# Patient Record
Sex: MALE | Race: BLACK OR AFRICAN AMERICAN | NOT HISPANIC OR LATINO | Employment: FULL TIME | URBAN - METROPOLITAN AREA
[De-identification: names, ages, dates, MRNs, and addresses within clinical notes are randomized per-mention and may not be internally consistent; named-entity substitution may affect disease eponyms.]

---

## 2018-12-12 ENCOUNTER — HOSPITAL ENCOUNTER (EMERGENCY)
Facility: HOSPITAL | Age: 48
Discharge: HOME | End: 2018-12-12
Attending: EMERGENCY MEDICINE
Payer: MEDICAID

## 2018-12-12 VITALS
OXYGEN SATURATION: 96 % | HEART RATE: 100 BPM | DIASTOLIC BLOOD PRESSURE: 105 MMHG | TEMPERATURE: 98 F | WEIGHT: 280 LBS | SYSTOLIC BLOOD PRESSURE: 168 MMHG | BODY MASS INDEX: 43.95 KG/M2 | HEIGHT: 67 IN | RESPIRATION RATE: 18 BRPM

## 2018-12-12 DIAGNOSIS — M10.9 ACUTE GOUT INVOLVING TOE OF LEFT FOOT, UNSPECIFIED CAUSE: Primary | ICD-10-CM

## 2018-12-12 LAB
BUN SERPL-MCNC: 9 MG/DL (ref 6–30)
CHLORIDE SERPL-SCNC: 103 MMOL/L (ref 95–110)
CREAT SERPL-MCNC: 0.8 MG/DL (ref 0.5–1.4)
GLUCOSE SERPL-MCNC: 98 MG/DL (ref 70–110)
HCT VFR BLD CALC: 40 %PCV (ref 36–54)
POC IONIZED CALCIUM: 1.2 MMOL/L (ref 1.06–1.42)
POC TCO2 (MEASURED): 25 MMOL/L (ref 23–29)
POTASSIUM BLD-SCNC: 4 MMOL/L (ref 3.5–5.1)
SAMPLE: NORMAL
SODIUM BLD-SCNC: 142 MMOL/L (ref 136–145)

## 2018-12-12 PROCEDURE — 96372 THER/PROPH/DIAG INJ SC/IM: CPT

## 2018-12-12 PROCEDURE — 99284 EMERGENCY DEPT VISIT MOD MDM: CPT | Mod: ,,,

## 2018-12-12 PROCEDURE — 63600175 PHARM REV CODE 636 W HCPCS

## 2018-12-12 PROCEDURE — 25000003 PHARM REV CODE 250

## 2018-12-12 PROCEDURE — 99284 EMERGENCY DEPT VISIT MOD MDM: CPT | Mod: 25

## 2018-12-12 RX ORDER — ATORVASTATIN CALCIUM 10 MG/1
10 TABLET, FILM COATED ORAL DAILY
COMMUNITY
End: 2019-11-25 | Stop reason: CLARIF

## 2018-12-12 RX ORDER — ALLOPURINOL 100 MG/1
100 TABLET ORAL DAILY
COMMUNITY
End: 2021-04-05 | Stop reason: SDUPTHER

## 2018-12-12 RX ORDER — METFORMIN HYDROCHLORIDE 1000 MG/1
1000 TABLET ORAL 2 TIMES DAILY WITH MEALS
COMMUNITY
End: 2021-07-06 | Stop reason: SDUPTHER

## 2018-12-12 RX ORDER — INDOMETHACIN 50 MG/1
50 CAPSULE ORAL 2 TIMES DAILY WITH MEALS
Qty: 20 CAPSULE | Refills: 0 | Status: SHIPPED | OUTPATIENT
Start: 2018-12-12 | End: 2019-11-25 | Stop reason: ALTCHOICE

## 2018-12-12 RX ORDER — DEXAMETHASONE SODIUM PHOSPHATE 4 MG/ML
8 INJECTION, SOLUTION INTRA-ARTICULAR; INTRALESIONAL; INTRAMUSCULAR; INTRAVENOUS; SOFT TISSUE
Status: COMPLETED | OUTPATIENT
Start: 2018-12-12 | End: 2018-12-12

## 2018-12-12 RX ORDER — INDOMETHACIN 25 MG/1
50 CAPSULE ORAL
Status: COMPLETED | OUTPATIENT
Start: 2018-12-12 | End: 2018-12-12

## 2018-12-12 RX ORDER — HYDROCHLOROTHIAZIDE 12.5 MG/1
12.5 TABLET ORAL DAILY
COMMUNITY
End: 2019-11-25 | Stop reason: CLARIF

## 2018-12-12 RX ORDER — LISINOPRIL 10 MG/1
10 TABLET ORAL DAILY
COMMUNITY
End: 2019-11-25 | Stop reason: CLARIF

## 2018-12-12 RX ADMIN — DEXAMETHASONE SODIUM PHOSPHATE 8 MG: 4 INJECTION, SOLUTION INTRAMUSCULAR; INTRAVENOUS at 04:12

## 2018-12-12 RX ADMIN — INDOMETHACIN 50 MG: 25 CAPSULE ORAL at 04:12

## 2018-12-12 NOTE — ED NOTES
LOC: The patient is awake, alert, and oriented to place, time, situation. Affect is appropriate.  Speech is appropriate and clear.     APPEARANCE: Patient resting uncomfortably, reporting left foot pain,  in no acute distress.  Patient is clean and well groomed.    SKIN: The skin is warm and dry; color consistent with ethnicity.  Patient has normal skin turgor and moist mucus membranes.  Skin intact; no breakdown or bruising noted.     MUSCULOSKELETAL: Pain and swelling left foot. Patient moving upper  extremities without difficulty.  Denies weakness.     RESPIRATORY: Airway is open and patent. Respirations spontaneous, even, easy, and non-labored.  Patient has a normal effort and rate.  No accessory muscle use noted. Denies cough.     CARDIAC:    No peripheral edema noted. No complaints of chest pain.      ABDOMEN: Soft and non tender to palpation.  No distention noted.     NEUROLOGIC: Eyes open spontaneously.  Behavior appropriate to situation.  Follows commands; facial expression symmetrical.  Purposeful motor response noted; normal sensation in all extremities.

## 2018-12-12 NOTE — ED PROVIDER NOTES
Encounter Date: 12/12/2018    SCRIBE #1 NOTE: I, Pearl Pozo, am scribing for, and in the presence of,  Shirin Hilario. I have scribed the following portions of the note - the APC attestation.       History     Chief Complaint   Patient presents with    Gout     left foot     47-year-old male with medical history of hypertension, diabetes mellitus type 2 and gout presents the ED with left toe pain and swelling.  He reports symptoms began this morning. Describes pain as throbbing and feels exactly like gout flares.  He states that he has not taken his allopurinol in 2 days and ate multiple peppers in a salad last night. . Denies injury, fall, fever, chills, nausea, vomiting, chest pain, shortness of breath, streaking, leg swelling.           Review of patient's allergies indicates:  No Known Allergies  Past Medical History:   Diagnosis Date    Diabetes mellitus     Gout     Hypertension      Past Surgical History:   Procedure Laterality Date    BACK SURGERY       No family history on file.  Social History     Tobacco Use    Smoking status: Heavy Tobacco Smoker    Smokeless tobacco: Never Used   Substance Use Topics    Alcohol use: No     Frequency: Never    Drug use: No     Review of Systems   Constitutional: Negative for chills, diaphoresis, fatigue and fever.   HENT: Negative for congestion.    Eyes: Negative for visual disturbance.   Respiratory: Negative for cough and shortness of breath.    Cardiovascular: Negative for chest pain, palpitations and leg swelling.   Gastrointestinal: Negative for abdominal pain, nausea and vomiting.   Genitourinary: Negative for dysuria and hematuria.   Musculoskeletal: Positive for arthralgias and joint swelling. Negative for back pain, myalgias and neck pain.   Skin: Negative for rash and wound.   Neurological: Negative for syncope, weakness, light-headedness, numbness and headaches.   Psychiatric/Behavioral: The patient is not nervous/anxious.        Physical  Exam     Initial Vitals [12/12/18 1541]   BP Pulse Resp Temp SpO2   (!) 168/105 100 18 98.2 °F (36.8 °C) 96 %      MAP       --         Physical Exam    Vitals reviewed.  Constitutional: He appears well-developed and well-nourished. He is not diaphoretic. No distress.   HENT:   Head: Normocephalic and atraumatic.   Nose: Nose normal.   Mouth/Throat: Oropharynx is clear and moist. No oropharyngeal exudate.   Eyes: Conjunctivae and EOM are normal. Pupils are equal, round, and reactive to light.   Neck: Normal range of motion. Neck supple. No thyromegaly present.   Cardiovascular: Normal rate, regular rhythm and intact distal pulses.   Pulmonary/Chest: Breath sounds normal. No respiratory distress. He has no wheezes. He exhibits no tenderness.   Abdominal: Soft. Bowel sounds are normal. He exhibits no distension. There is no tenderness. There is no rebound and no guarding.   Musculoskeletal: Normal range of motion.        Feet:    Lymphadenopathy:     He has no cervical adenopathy.   Neurological: He is alert and oriented to person, place, and time. He has normal strength. No sensory deficit.   Skin: Skin is warm and dry. Capillary refill takes less than 2 seconds. No rash and no abscess noted. There is erythema.   Psychiatric: He has a normal mood and affect.         ED Course   Procedures  Labs Reviewed   ISTAT PROCEDURE          Imaging Results    None          Medical Decision Making:   Initial Assessment:   47-year-old male with medical history of hypertension, diabetes mellitus type 2 and gout presents the ED with left toe pain and swelling that began this am. Has not taken allopurinol in 2 days and at peppers last night. Has not tried any OTC meds to alleviate symptoms. +erythema and swelling to left toe. No signs of fluctuance, induration, drainage, rash, wound or streaking.   Differential Diagnosis:   DDX includes but is not limited to gout, cellulitis, dermatitis. Considered but do not suspect fracture or  osteomyelitis.   Clinical Tests:   Lab Tests: Ordered and Reviewed  ED Management:  Will get Istat to evaluate creatinine. Cr 0.8. Will give IM decadron 8mg and indomethacin 50mg. Will discharged with indomethacin 50mg BID x 3 days and PCP follow up in 2 days.     Least likely suspect cellulitis since patient reports that this feels exactly liek gout flare and he has not taken allopurinol in 2 days. Denies fever, chills. Will treat with indomethacin and have patient follow up with PCP at [facility] in 2-3 days. patient is in agreement with plan. Discharged to home in stable condition, return to ED warnings given, follow up and patient care instructions given.      I have discussed the treatment and management of this patient with my supervisory physician, and we agree on the plan of care.               Scribe Attestation:   Scribe #1: I performed the above scribed service and the documentation accurately describes the services I performed. I attest to the accuracy of the note.    Attending Attestation:     Physician Attestation Statement for NP/PA:   I discussed this assessment and plan of this patient with the NP/PA, but I did not personally examine the patient. The face to face encounter was performed by the NP/PA.                     Clinical Impression:   The encounter diagnosis was Acute gout involving toe of left foot, unspecified cause.      Disposition:   Disposition: Discharged  Condition: Stable                        Kandis Begum PA-C  12/12/18 2036       Kendall Carpio MD  12/12/18 2230

## 2018-12-12 NOTE — DISCHARGE INSTRUCTIONS
Take indomethacin twice a day for 3 days and follow up with primary care doctor in 2 days.     Our goal in the emergency department is to always give you outstanding care and exceptional service. You may receive a survey by mail or e-mail in the next week regarding your experience in our ED. We would greatly appreciate your completing and returning the survey. Your feedback provides us with a way to recognize our staff who give very good care and it helps us learn how to improve when your experience was below our aspiration of excellence.

## 2019-11-25 ENCOUNTER — HOSPITAL ENCOUNTER (EMERGENCY)
Facility: HOSPITAL | Age: 49
Discharge: HOME | End: 2019-11-25
Attending: EMERGENCY MEDICINE
Payer: MEDICAID

## 2019-11-25 VITALS
DIASTOLIC BLOOD PRESSURE: 81 MMHG | WEIGHT: 289 LBS | HEIGHT: 67 IN | HEART RATE: 93 BPM | OXYGEN SATURATION: 92 % | TEMPERATURE: 100 F | RESPIRATION RATE: 18 BRPM | SYSTOLIC BLOOD PRESSURE: 132 MMHG | BODY MASS INDEX: 45.36 KG/M2

## 2019-11-25 DIAGNOSIS — R05.9 COUGH: ICD-10-CM

## 2019-11-25 DIAGNOSIS — J40 BRONCHITIS: Primary | ICD-10-CM

## 2019-11-25 LAB
ALBUMIN SERPL BCP-MCNC: 3.8 G/DL (ref 3.5–5.2)
ALP SERPL-CCNC: 111 U/L (ref 55–135)
ALT SERPL W/O P-5'-P-CCNC: 27 U/L (ref 10–44)
ANION GAP SERPL CALC-SCNC: 11 MMOL/L (ref 8–16)
AST SERPL-CCNC: 24 U/L (ref 10–40)
BASOPHILS # BLD AUTO: 0.06 K/UL (ref 0–0.2)
BASOPHILS NFR BLD: 0.7 % (ref 0–1.9)
BILIRUB SERPL-MCNC: 1 MG/DL (ref 0.1–1)
BNP SERPL-MCNC: <10 PG/ML (ref 0–99)
BUN SERPL-MCNC: 14 MG/DL (ref 6–20)
CALCIUM SERPL-MCNC: 9 MG/DL (ref 8.7–10.5)
CHLORIDE SERPL-SCNC: 104 MMOL/L (ref 95–110)
CO2 SERPL-SCNC: 22 MMOL/L (ref 23–29)
CREAT SERPL-MCNC: 1.4 MG/DL (ref 0.5–1.4)
DIFFERENTIAL METHOD: ABNORMAL
EOSINOPHIL # BLD AUTO: 0.1 K/UL (ref 0–0.5)
EOSINOPHIL NFR BLD: 0.8 % (ref 0–8)
ERYTHROCYTE [DISTWIDTH] IN BLOOD BY AUTOMATED COUNT: 12.6 % (ref 11.5–14.5)
EST. GFR  (AFRICAN AMERICAN): >60 ML/MIN/1.73 M^2
EST. GFR  (NON AFRICAN AMERICAN): 59 ML/MIN/1.73 M^2
GLUCOSE SERPL-MCNC: 124 MG/DL (ref 70–110)
HCT VFR BLD AUTO: 39.8 % (ref 40–54)
HGB BLD-MCNC: 13.2 G/DL (ref 14–18)
IMM GRANULOCYTES # BLD AUTO: 0.05 K/UL (ref 0–0.04)
INFLUENZA A, MOLECULAR: NEGATIVE
INFLUENZA B, MOLECULAR: NEGATIVE
LYMPHOCYTES # BLD AUTO: 1.9 K/UL (ref 1–4.8)
LYMPHOCYTES NFR BLD: 21.8 % (ref 18–48)
MCH RBC QN AUTO: 30 PG (ref 27–31)
MCHC RBC AUTO-ENTMCNC: 33.2 G/DL (ref 32–36)
MCV RBC AUTO: 91 FL (ref 82–98)
MONOCYTES # BLD AUTO: 0.7 K/UL (ref 0.3–1)
MONOCYTES NFR BLD: 7.6 % (ref 4–15)
NEUTROPHILS # BLD AUTO: 5.9 K/UL (ref 1.8–7.7)
NEUTROPHILS NFR BLD: 68.5 % (ref 38–73)
NRBC BLD-RTO: 0 /100 WBC
PLATELET # BLD AUTO: 252 K/UL (ref 150–350)
PMV BLD AUTO: 10.7 FL (ref 9.2–12.9)
POTASSIUM SERPL-SCNC: 3.8 MMOL/L (ref 3.5–5.1)
PROT SERPL-MCNC: 7 G/DL (ref 6–8.4)
RBC # BLD AUTO: 4.4 M/UL (ref 4.6–6.2)
SODIUM SERPL-SCNC: 137 MMOL/L (ref 136–145)
SPECIMEN SOURCE: NORMAL
TROPONIN I SERPL DL<=0.01 NG/ML-MCNC: 0.01 NG/ML (ref 0–0.03)
WBC # BLD AUTO: 8.56 K/UL (ref 3.9–12.7)

## 2019-11-25 PROCEDURE — 36415 COLL VENOUS BLD VENIPUNCTURE: CPT

## 2019-11-25 PROCEDURE — 99284 EMERGENCY DEPT VISIT MOD MDM: CPT | Mod: 25

## 2019-11-25 PROCEDURE — 80053 COMPREHEN METABOLIC PANEL: CPT

## 2019-11-25 PROCEDURE — 84484 ASSAY OF TROPONIN QUANT: CPT

## 2019-11-25 PROCEDURE — 85025 COMPLETE CBC W/AUTO DIFF WBC: CPT

## 2019-11-25 PROCEDURE — 87502 INFLUENZA DNA AMP PROBE: CPT

## 2019-11-25 PROCEDURE — 25000003 PHARM REV CODE 250: Performed by: EMERGENCY MEDICINE

## 2019-11-25 PROCEDURE — 83880 ASSAY OF NATRIURETIC PEPTIDE: CPT

## 2019-11-25 RX ORDER — SIMVASTATIN 20 MG/1
20 TABLET, FILM COATED ORAL NIGHTLY
COMMUNITY
End: 2021-04-05

## 2019-11-25 RX ORDER — AMLODIPINE BESYLATE 10 MG/1
10 TABLET ORAL DAILY
COMMUNITY
End: 2021-07-06 | Stop reason: SDUPTHER

## 2019-11-25 RX ORDER — CYCLOBENZAPRINE HCL 5 MG
5 TABLET ORAL 3 TIMES DAILY PRN
COMMUNITY
End: 2021-04-05

## 2019-11-25 RX ORDER — FLUTICASONE PROPIONATE 50 UG/1
POWDER, METERED RESPIRATORY (INHALATION)
COMMUNITY
End: 2021-04-05

## 2019-11-25 RX ORDER — ERGOCALCIFEROL 1.25 MG/1
50000 CAPSULE ORAL
COMMUNITY
End: 2021-04-05

## 2019-11-25 RX ORDER — AZITHROMYCIN 250 MG/1
250 TABLET, FILM COATED ORAL DAILY
Qty: 6 TABLET | Refills: 0 | Status: SHIPPED | OUTPATIENT
Start: 2019-11-25 | End: 2021-04-05

## 2019-11-25 RX ORDER — ACETAMINOPHEN 325 MG/1
650 TABLET ORAL
Status: COMPLETED | OUTPATIENT
Start: 2019-11-25 | End: 2019-11-25

## 2019-11-25 RX ORDER — ALBUTEROL SULFATE 5 MG/ML
2.5 SOLUTION RESPIRATORY (INHALATION) EVERY 6 HOURS PRN
COMMUNITY

## 2019-11-25 RX ORDER — CETIRIZINE HYDROCHLORIDE 10 MG/1
10 TABLET ORAL DAILY
COMMUNITY
End: 2022-05-11 | Stop reason: SDUPTHER

## 2019-11-25 RX ORDER — OMEPRAZOLE 40 MG/1
40 CAPSULE, DELAYED RELEASE ORAL DAILY
COMMUNITY
End: 2021-04-05 | Stop reason: SDUPTHER

## 2019-11-25 RX ORDER — BENZONATATE 100 MG/1
100 CAPSULE ORAL 3 TIMES DAILY PRN
Qty: 20 CAPSULE | Refills: 0 | Status: SHIPPED | OUTPATIENT
Start: 2019-11-25 | End: 2019-12-05

## 2019-11-25 RX ORDER — LOSARTAN POTASSIUM 25 MG/1
25 TABLET ORAL DAILY
COMMUNITY
End: 2021-07-06 | Stop reason: SDUPTHER

## 2019-11-25 RX ADMIN — ACETAMINOPHEN 650 MG: 325 TABLET ORAL at 09:11

## 2019-11-26 NOTE — ED PROVIDER NOTES
Encounter Date: 11/25/2019    SCRIBE #1 NOTE: I, Kandis Mayo, am scribing for, and in the presence of, Dr. Broussard.       History     Chief Complaint   Patient presents with    Fever    Chills    Abdominal Pain    Diarrhea       Time seen by provider: 9:33 PM on 11/25/2019    Pierre Masters Jr. is a 48 y.o. male with DM, HTN and asthma who presents to the ED with c/o fever and abdominal pain with associated cough, chills and, body aches that started one week ago. He was seen by his PCP and tested negative for the flu.  Positive sick contact with a family member who has the flu. He has not taken anything today for his fever. PSHx of back surgery. NKDA.     The history is provided by the patient and the spouse.     Review of patient's allergies indicates:  No Known Allergies  Past Medical History:   Diagnosis Date    Asthma     Diabetes mellitus     Gout     Hypertension      Past Surgical History:   Procedure Laterality Date    BACK SURGERY       No family history on file.  Social History     Tobacco Use    Smoking status: Heavy Tobacco Smoker    Smokeless tobacco: Never Used   Substance Use Topics    Alcohol use: No     Frequency: Never    Drug use: No     Review of Systems   Constitutional: Positive for chills and fever.   Respiratory: Positive for cough. Negative for shortness of breath.    Gastrointestinal: Positive for abdominal pain.   Genitourinary: Negative for flank pain.   Musculoskeletal: Positive for myalgias (generalized). Negative for gait problem.   Neurological: Negative for weakness.   Psychiatric/Behavioral: Negative for confusion.       Physical Exam     Initial Vitals [11/25/19 2111]   BP Pulse Resp Temp SpO2   132/81 (!) 116 18 100.3 °F (37.9 °C) 95 %      MAP       --         Physical Exam    Nursing note and vitals reviewed.  Constitutional: He appears well-developed and well-nourished. He is not diaphoretic. No distress.   HENT:   Head: Normocephalic and atraumatic.   Eyes:  EOM are normal. Pupils are equal, round, and reactive to light.   Neck: Normal range of motion. Neck supple.   Cardiovascular: Regular rhythm, normal heart sounds and intact distal pulses. Tachycardia present.  Exam reveals no gallop and no friction rub.    No murmur heard.  Pulmonary/Chest: Breath sounds normal. No respiratory distress. He has no wheezes. He has no rhonchi. He has no rales.   Abdominal: Soft. Bowel sounds are normal. There is no tenderness. There is no rebound and no guarding.   Musculoskeletal: Normal range of motion.   Neurological: He is alert and oriented to person, place, and time.   Skin: Skin is warm.   Psychiatric: He has a normal mood and affect. His behavior is normal. Judgment and thought content normal.         ED Course   Procedures  Labs Reviewed   COMPREHENSIVE METABOLIC PANEL - Abnormal; Notable for the following components:       Result Value    CO2 22 (*)     Glucose 124 (*)     eGFR if non  59 (*)     All other components within normal limits   CBC W/ AUTO DIFFERENTIAL - Abnormal; Notable for the following components:    RBC 4.40 (*)     Hemoglobin 13.2 (*)     Hematocrit 39.8 (*)     Immature Grans (Abs) 0.05 (*)     All other components within normal limits   INFLUENZA A & B BY MOLECULAR   B-TYPE NATRIURETIC PEPTIDE   TROPONIN I          Imaging Results          X-Ray Chest PA And Lateral (In process)                  Medical Decision Making:   History:   Old Medical Records: I decided to obtain old medical records.  Initial Assessment:   48-year-old male presented with multiple complaints.  Differential Diagnosis:   My differential diagnosis includes influenza, pneumonia, and bronchitis.     Clinical Tests:   Lab Tests: Ordered and Reviewed  Radiological Study: Ordered and Reviewed  ED Management:  The patient was emergently evaluated in the emergency department, his evaluation was significant for a middle-age male with a benign abdominal exam.  The patient's  x-ray shows a bronchitis per Radiology.  The patient's labs showed no acute abnormalities.  The patient was treated with p.o. Tylenol and IV fluids.  He is stable for discharge to home.  He will be discharged home with p.o. Zithromax and p.o. Tessalon Perles.  He is to otherwise follow up with his PCP for further care.            Scribe Attestation:   Scribe #1: I performed the above scribed service and the documentation accurately describes the services I performed. I attest to the accuracy of the note.        I, Dr. Justino Broussard, personally performed the services described in this documentation. All medical record entries made by the scribe were at my direction and in my presence.  I have reviewed the chart and agree that the record reflects my personal performance and is accurate and complete. Justino Broussard MD.  2:05 AM 11/26/2019                     Clinical Impression:       ICD-10-CM ICD-9-CM   1. Bronchitis J40 490   2. Cough R05 786.2         Disposition:   Disposition: Discharged  Condition: Stable                     Justino Broussard MD  11/26/19 0206

## 2020-01-20 ENCOUNTER — OCCUPATIONAL HEALTH (OUTPATIENT)
Dept: URGENT CARE | Facility: CLINIC | Age: 50
End: 2020-01-20
Payer: MEDICAID

## 2020-01-20 DIAGNOSIS — Z02.83 ENCOUNTER FOR DRUG SCREENING: Primary | ICD-10-CM

## 2020-01-20 PROCEDURE — 80305 OOH NON-DOT DRUG SCREEN: ICD-10-PCS | Mod: S$GLB,,, | Performed by: PREVENTIVE MEDICINE

## 2020-01-20 PROCEDURE — 80305 DRUG TEST PRSMV DIR OPT OBS: CPT | Mod: S$GLB,,, | Performed by: PREVENTIVE MEDICINE

## 2020-01-20 RX ORDER — LOSARTAN POTASSIUM 25 MG/1
TABLET ORAL
COMMUNITY
Start: 2019-07-08 | End: 2021-04-05 | Stop reason: SDUPTHER

## 2020-01-20 RX ORDER — MOXIFLOXACIN 5 MG/ML
1 SOLUTION/ DROPS OPHTHALMIC
COMMUNITY
Start: 2020-01-06 | End: 2021-04-05

## 2020-01-20 RX ORDER — CARBOXYMETHYLCELLULOSE SODIUM 5 MG/ML
1 SOLUTION/ DROPS OPHTHALMIC
COMMUNITY
Start: 2020-01-13 | End: 2021-01-12

## 2020-01-20 RX ORDER — HYDROCHLOROTHIAZIDE 12.5 MG/1
CAPSULE ORAL
COMMUNITY
Start: 2019-01-31 | End: 2021-04-05

## 2020-01-20 RX ORDER — DICLOFENAC SODIUM 10 MG/G
GEL TOPICAL
COMMUNITY
Start: 2019-04-03 | End: 2020-10-07 | Stop reason: SDUPTHER

## 2020-01-20 RX ORDER — NAPROXEN SODIUM 220 MG/1
81 TABLET, FILM COATED ORAL
COMMUNITY
Start: 2019-01-31 | End: 2022-08-16

## 2020-01-20 RX ORDER — ALLOPURINOL 100 MG/1
TABLET ORAL
COMMUNITY
Start: 2019-05-02 | End: 2021-04-05 | Stop reason: SDUPTHER

## 2020-01-20 RX ORDER — AZELASTINE 1 MG/ML
SPRAY, METERED NASAL
COMMUNITY
Start: 2019-10-17 | End: 2022-05-11 | Stop reason: SDUPTHER

## 2020-01-20 RX ORDER — LISINOPRIL 10 MG/1
10 TABLET ORAL
COMMUNITY
End: 2021-04-05

## 2020-01-20 RX ORDER — IBUPROFEN 600 MG/1
TABLET ORAL
Refills: 0 | COMMUNITY
Start: 2019-11-18 | End: 2020-10-07 | Stop reason: SDUPTHER

## 2020-01-20 RX ORDER — ATORVASTATIN CALCIUM 10 MG/1
10 TABLET, FILM COATED ORAL
COMMUNITY
End: 2021-04-05

## 2020-01-20 RX ORDER — FLUTICASONE PROPIONATE 50 MCG
SPRAY, SUSPENSION (ML) NASAL
Refills: 0 | COMMUNITY
Start: 2019-11-18 | End: 2021-08-13 | Stop reason: SDUPTHER

## 2020-01-20 RX ORDER — HYDROCHLOROTHIAZIDE 12.5 MG/1
12.5 TABLET ORAL
COMMUNITY
End: 2021-04-05

## 2020-01-20 RX ORDER — AMLODIPINE BESYLATE 10 MG/1
10 TABLET ORAL
COMMUNITY
Start: 2019-05-02 | End: 2020-05-01

## 2020-01-20 RX ORDER — TRIAMCINOLONE ACETONIDE 1 MG/G
OINTMENT TOPICAL
COMMUNITY
Start: 2019-07-18 | End: 2021-08-13 | Stop reason: SDUPTHER

## 2020-01-20 RX ORDER — OMEPRAZOLE 40 MG/1
CAPSULE, DELAYED RELEASE ORAL
COMMUNITY
Start: 2019-12-30 | End: 2021-07-28 | Stop reason: SDUPTHER

## 2020-01-20 RX ORDER — SIMVASTATIN 20 MG/1
20 TABLET, FILM COATED ORAL
COMMUNITY
Start: 2019-05-20 | End: 2021-04-05

## 2020-01-20 RX ORDER — PROMETHAZINE HYDROCHLORIDE AND CODEINE PHOSPHATE 6.25; 1 MG/5ML; MG/5ML
SOLUTION ORAL
Refills: 0 | COMMUNITY
Start: 2019-11-18 | End: 2021-04-05

## 2020-01-20 RX ORDER — ALBUTEROL SULFATE 90 UG/1
AEROSOL, METERED RESPIRATORY (INHALATION)
COMMUNITY
Start: 2018-10-25 | End: 2021-08-13 | Stop reason: SDUPTHER

## 2020-01-20 RX ORDER — METFORMIN HYDROCHLORIDE 1000 MG/1
TABLET ORAL
COMMUNITY
Start: 2019-05-02 | End: 2021-04-05 | Stop reason: SDUPTHER

## 2020-01-20 RX ORDER — ACYCLOVIR 200 MG/1
400 CAPSULE ORAL
COMMUNITY
Start: 2019-12-19 | End: 2021-04-05

## 2020-01-20 RX ORDER — MELOXICAM 7.5 MG/1
7.5 TABLET ORAL
COMMUNITY
Start: 2019-04-03 | End: 2020-10-07 | Stop reason: SDUPTHER

## 2020-01-20 RX ORDER — INDOMETHACIN 50 MG/1
CAPSULE ORAL
COMMUNITY
Start: 2018-12-12 | End: 2020-10-07 | Stop reason: SDUPTHER

## 2020-01-20 RX ORDER — LIDOCAINE HYDROCHLORIDE 20 MG/ML
SOLUTION ORAL; TOPICAL
Refills: 0 | COMMUNITY
Start: 2019-11-18 | End: 2021-04-05

## 2020-01-20 RX ORDER — METHOCARBAMOL 500 MG/1
TABLET, FILM COATED ORAL
COMMUNITY
Start: 2017-09-19 | End: 2021-04-05

## 2020-02-11 ENCOUNTER — HOSPITAL ENCOUNTER (EMERGENCY)
Facility: HOSPITAL | Age: 50
Discharge: HOME | End: 2020-02-11
Attending: EMERGENCY MEDICINE
Payer: MEDICAID

## 2020-02-11 VITALS
HEART RATE: 94 BPM | BODY MASS INDEX: 45.2 KG/M2 | HEIGHT: 67 IN | WEIGHT: 288 LBS | OXYGEN SATURATION: 98 % | TEMPERATURE: 98 F | DIASTOLIC BLOOD PRESSURE: 84 MMHG | RESPIRATION RATE: 18 BRPM | SYSTOLIC BLOOD PRESSURE: 165 MMHG

## 2020-02-11 DIAGNOSIS — M10.9 ACUTE GOUT INVOLVING TOE OF LEFT FOOT, UNSPECIFIED CAUSE: Primary | ICD-10-CM

## 2020-02-11 PROCEDURE — 63600175 PHARM REV CODE 636 W HCPCS: Performed by: PHYSICIAN ASSISTANT

## 2020-02-11 PROCEDURE — 99284 EMERGENCY DEPT VISIT MOD MDM: CPT | Mod: 25

## 2020-02-11 PROCEDURE — 25000003 PHARM REV CODE 250: Performed by: EMERGENCY MEDICINE

## 2020-02-11 PROCEDURE — 96372 THER/PROPH/DIAG INJ SC/IM: CPT | Mod: 59

## 2020-02-11 RX ORDER — PREDNISONE 20 MG/1
40 TABLET ORAL DAILY
Qty: 10 TABLET | Refills: 0 | Status: SHIPPED | OUTPATIENT
Start: 2020-02-11 | End: 2020-02-16

## 2020-02-11 RX ORDER — HYDROCODONE BITARTRATE AND ACETAMINOPHEN 5; 325 MG/1; MG/1
1 TABLET ORAL
Status: COMPLETED | OUTPATIENT
Start: 2020-02-11 | End: 2020-02-11

## 2020-02-11 RX ORDER — HYDROCODONE BITARTRATE AND ACETAMINOPHEN 5; 325 MG/1; MG/1
1 TABLET ORAL EVERY 4 HOURS PRN
Qty: 10 TABLET | Refills: 0 | Status: SHIPPED | OUTPATIENT
Start: 2020-02-11 | End: 2021-04-05

## 2020-02-11 RX ORDER — METHYLPREDNISOLONE SOD SUCC 125 MG
125 VIAL (EA) INJECTION
Status: COMPLETED | OUTPATIENT
Start: 2020-02-11 | End: 2020-02-11

## 2020-02-11 RX ADMIN — METHYLPREDNISOLONE SODIUM SUCCINATE 125 MG: 125 INJECTION, POWDER, FOR SOLUTION INTRAMUSCULAR; INTRAVENOUS at 08:02

## 2020-02-11 RX ADMIN — HYDROCODONE BITARTRATE AND ACETAMINOPHEN 1 TABLET: 5; 325 TABLET ORAL at 09:02

## 2020-02-12 NOTE — ED PROVIDER NOTES
Encounter Date: 2/11/2020       History     Chief Complaint   Patient presents with    Gout     Left toe pain since yesterday. States has been eating what he's not supposed to be eating.      Patient here with reported flare-up of his gout after eating crawfish reports pain in his left great toe he states he typically takes allopurinol his Indocin does not help his pain he denies any other symptoms no other joint pain reported no trauma noted no fever chills        Review of patient's allergies indicates:  No Known Allergies  Past Medical History:   Diagnosis Date    Asthma     Diabetes mellitus     Gout     Hypertension      Past Surgical History:   Procedure Laterality Date    BACK SURGERY       No family history on file.  Social History     Tobacco Use    Smoking status: Heavy Tobacco Smoker    Smokeless tobacco: Never Used   Substance Use Topics    Alcohol use: No     Frequency: Never    Drug use: No     Review of Systems   Constitutional: Negative for appetite change, chills and fever.   HENT: Negative.    Eyes: Negative.    Respiratory: Negative.    Endocrine: Negative.    Genitourinary: Negative.    Musculoskeletal: Positive for arthralgias.   Skin: Negative for color change and rash.   Allergic/Immunologic: Negative.    Neurological: Negative.    Hematological: Negative.    Psychiatric/Behavioral: Negative.        Physical Exam     Initial Vitals [02/11/20 1919]   BP Pulse Resp Temp SpO2   (!) 177/86 91 18 98.1 °F (36.7 °C) 98 %      MAP       --         Physical Exam    Constitutional: He appears well-developed and well-nourished. No distress.   HENT:   Head: Normocephalic and atraumatic.   Eyes: Conjunctivae and EOM are normal. Pupils are equal, round, and reactive to light.   Cardiovascular: Normal rate, regular rhythm, normal heart sounds and intact distal pulses.   Pulmonary/Chest: Breath sounds normal. No respiratory distress.   Musculoskeletal: Normal range of motion. He exhibits tenderness.    Left great toe with swelling and tenderness pain with range of motion no significant erythema noted 2+ dorsalis pedis and posterior tibial pulses bilaterally   Neurological: He is alert and oriented to person, place, and time. GCS score is 15. GCS eye subscore is 4. GCS verbal subscore is 5. GCS motor subscore is 6.   Skin: Skin is warm and dry. Capillary refill takes less than 2 seconds. No erythema.   Psychiatric: He has a normal mood and affect. His behavior is normal.         ED Course   Procedures  Labs Reviewed - No data to display       Imaging Results    None                            ED Course as of Feb 11 2132   Tue Feb 11, 2020 2010 Right foot gout taking allopurinol with no relief and states indocin does not help  Bp elevated    [ES]   2032 Solumedrol ordered for left toe pain.      [ES]      ED Course User Index  [ES] Brigette Davalos PA-C                Clinical Impression:       ICD-10-CM ICD-9-CM   1. Acute gout involving toe of left foot, unspecified cause M10.9 274.01                             Maximo Hernandez MD  02/11/20 3736

## 2020-04-21 ENCOUNTER — HOSPITAL ENCOUNTER (EMERGENCY)
Facility: HOSPITAL | Age: 50
Discharge: HOME | End: 2020-04-21
Attending: EMERGENCY MEDICINE
Payer: MEDICAID

## 2020-04-21 VITALS
DIASTOLIC BLOOD PRESSURE: 77 MMHG | BODY MASS INDEX: 44.42 KG/M2 | TEMPERATURE: 98 F | RESPIRATION RATE: 19 BRPM | WEIGHT: 283 LBS | SYSTOLIC BLOOD PRESSURE: 165 MMHG | OXYGEN SATURATION: 99 % | HEART RATE: 78 BPM | HEIGHT: 67 IN

## 2020-04-21 DIAGNOSIS — M10.9 EXACERBATION OF GOUT: Primary | ICD-10-CM

## 2020-04-21 PROCEDURE — 99284 EMERGENCY DEPT VISIT MOD MDM: CPT | Mod: 25

## 2020-04-21 PROCEDURE — 63600175 PHARM REV CODE 636 W HCPCS: Performed by: EMERGENCY MEDICINE

## 2020-04-21 PROCEDURE — 96372 THER/PROPH/DIAG INJ SC/IM: CPT | Mod: 59

## 2020-04-21 RX ORDER — KETOROLAC TROMETHAMINE 30 MG/ML
30 INJECTION, SOLUTION INTRAMUSCULAR; INTRAVENOUS
Status: COMPLETED | OUTPATIENT
Start: 2020-04-21 | End: 2020-04-21

## 2020-04-21 RX ORDER — INDOMETHACIN 50 MG/1
50 CAPSULE ORAL
Qty: 12 CAPSULE | Refills: 0 | Status: SHIPPED | OUTPATIENT
Start: 2020-04-21 | End: 2020-10-07 | Stop reason: SDUPTHER

## 2020-04-21 RX ADMIN — KETOROLAC TROMETHAMINE 30 MG: 30 INJECTION, SOLUTION INTRAMUSCULAR; INTRAVENOUS at 02:04

## 2020-04-21 NOTE — ED PROVIDER NOTES
Encounter Date: 4/21/2020       History     Chief Complaint   Patient presents with    Ankle Pain     Pt states L ankle and foot pain since sat. States hx of gout.     Patient with a history of gout.  Patient had crawfish 3 days ago.  His 1 day history of ankle pain.  He has some swelling.  Increased pain with walking.  No trauma.  Similar to previous gouty exacerbations.        Review of patient's allergies indicates:  No Known Allergies  Past Medical History:   Diagnosis Date    Asthma     Diabetes mellitus     Gout     Hypertension      Past Surgical History:   Procedure Laterality Date    BACK SURGERY       No family history on file.  Social History     Tobacco Use    Smoking status: Heavy Tobacco Smoker    Smokeless tobacco: Never Used   Substance Use Topics    Alcohol use: No     Frequency: Never    Drug use: No     Review of Systems   Constitutional: Negative for chills and fever.   HENT: Negative for sore throat.    Eyes: Negative for photophobia and visual disturbance.   Respiratory: Negative for shortness of breath.    Cardiovascular: Negative for chest pain.   Gastrointestinal: Negative for abdominal pain and vomiting.   Genitourinary: Negative for dysuria.   Musculoskeletal: Negative for back pain.   Skin: Negative for rash.   Neurological: Negative for weakness and headaches.   Psychiatric/Behavioral: Negative for confusion.       Physical Exam     Initial Vitals [04/21/20 1233]   BP Pulse Resp Temp SpO2   (!) 152/95 94 20 99 °F (37.2 °C) 98 %      MAP       --         Physical Exam    Nursing note and vitals reviewed.  Constitutional: He is not diaphoretic. No distress.   HENT:   Head: Normocephalic and atraumatic.   Eyes: Conjunctivae are normal.   Musculoskeletal:   Left ankle with very mild swelling with no erythema or warmth.  Good range of motion.  2+ dorsalis pedis and posterior tibial pulses.  Sensation intact.   Skin: No rash noted.   Psychiatric: He has a normal mood and affect.          ED Course   Procedures  Labs Reviewed - No data to display       Imaging Results    None          Medical Decision Making:   History:   Old Medical Records: I decided to obtain old medical records.  ED Management:  Patient presents with history and physical exam consistent with gouty exacerbation.  Patient reports NSAIDs helped him in the past.  No history of renal sufficiency.  Old record reviewed and patient with normal creatinine.  Will give Toradol and begin Indocin is outpatient.  Patient already on allopurinol                                 Clinical Impression:       ICD-10-CM ICD-9-CM   1. Exacerbation of gout M10.9 274.9                                Roberto Silva MD  04/21/20 1919

## 2020-04-21 NOTE — ED TRIAGE NOTES
Presents to Emergency Room with c/o pain to inner left ankle, states it runs to toes and up to knee on left leg. Patient also states that he believes this is a gout flare up, because he ate crawfish on Sunday. He says that his last flare-up was 2 months ago. A,A,O x3. Respirarions WNL, no c/o CP. V/S WNL. Call light in reach, will continue to monitor.

## 2020-04-30 ENCOUNTER — PATIENT OUTREACH (OUTPATIENT)
Dept: EMERGENCY MEDICINE | Facility: HOSPITAL | Age: 50
End: 2020-04-30

## 2020-10-07 ENCOUNTER — HOSPITAL ENCOUNTER (EMERGENCY)
Facility: HOSPITAL | Age: 50
Discharge: HOME | End: 2020-10-07
Attending: EMERGENCY MEDICINE
Payer: MEDICAID

## 2020-10-07 VITALS
WEIGHT: 284 LBS | HEART RATE: 80 BPM | RESPIRATION RATE: 16 BRPM | DIASTOLIC BLOOD PRESSURE: 94 MMHG | TEMPERATURE: 99 F | HEIGHT: 67 IN | OXYGEN SATURATION: 99 % | SYSTOLIC BLOOD PRESSURE: 144 MMHG | BODY MASS INDEX: 44.57 KG/M2

## 2020-10-07 DIAGNOSIS — M10.9 ACUTE GOUTY ARTHRITIS: Primary | ICD-10-CM

## 2020-10-07 PROCEDURE — 96372 THER/PROPH/DIAG INJ SC/IM: CPT

## 2020-10-07 PROCEDURE — 63600175 PHARM REV CODE 636 W HCPCS: Performed by: EMERGENCY MEDICINE

## 2020-10-07 PROCEDURE — 99284 EMERGENCY DEPT VISIT MOD MDM: CPT | Mod: 25

## 2020-10-07 RX ORDER — INDOMETHACIN 50 MG/1
50 CAPSULE ORAL
Qty: 15 CAPSULE | Refills: 0 | OUTPATIENT
Start: 2020-10-07 | End: 2021-03-17

## 2020-10-07 RX ORDER — KETOROLAC TROMETHAMINE 30 MG/ML
30 INJECTION, SOLUTION INTRAMUSCULAR; INTRAVENOUS
Status: COMPLETED | OUTPATIENT
Start: 2020-10-07 | End: 2020-10-07

## 2020-10-07 RX ADMIN — KETOROLAC TROMETHAMINE 30 MG: 30 INJECTION, SOLUTION INTRAMUSCULAR at 09:10

## 2020-10-07 NOTE — Clinical Note
"Pierre Escobedoe" Guerrero was seen and treated in our emergency department on 10/7/2020.  He may return to work on 10/09/2020.       If you have any questions or concerns, please don't hesitate to call.      Calvin LATIF RN    "

## 2020-10-08 NOTE — ED PROVIDER NOTES
Encounter Date: 10/7/2020    SCRIBE #1 NOTE: I, Vandana Ng, am scribing for, and in the presence of, Dr. Handy.       History     Chief Complaint   Patient presents with    Gout     left foot, started 2 days ago     10/7/2020  9:11 PM     The patient is a 49 y.o. male  who presents with gout. Patient c/o gradual onset of mild swelling and constant achy pain to the left foot which began 2 days ago after eating fish and alcoholic daiquiri. He denies any pain or injury to the foot. Pt reports he has been compliant with his medications, allopurinol and indomethacin. He denies any fevers or chills. PMHx of HTN, gout, DM, asthma. SHx of back surgery.    The history is provided by the patient.     Review of patient's allergies indicates:  No Known Allergies  Past Medical History:   Diagnosis Date    Asthma     Diabetes mellitus     Gout     Hypertension      Past Surgical History:   Procedure Laterality Date    BACK SURGERY       History reviewed. No pertinent family history.  Social History     Tobacco Use    Smoking status: Heavy Tobacco Smoker    Smokeless tobacco: Never Used   Substance Use Topics    Alcohol use: No     Frequency: Never    Drug use: No     Review of Systems   Constitutional: Negative for appetite change, chills and fever.   HENT: Negative for congestion, rhinorrhea and sore throat.    Respiratory: Negative for cough and shortness of breath.    Cardiovascular: Negative for chest pain.   Gastrointestinal: Negative for abdominal pain, diarrhea, nausea and vomiting.   Genitourinary: Negative for dysuria.   Musculoskeletal: Positive for arthralgias and joint swelling. Negative for back pain and myalgias.   Skin: Negative for rash.   Neurological: Negative for weakness and numbness.   Hematological: Does not bruise/bleed easily.       Physical Exam     Initial Vitals [10/07/20 2052]   BP Pulse Resp Temp SpO2   (!) 144/94 95 16 98.6 °F (37 °C) (!) 94 %      MAP       --         Physical  Exam    Nursing note and vitals reviewed.  Constitutional: He appears well-developed and well-nourished. He is not diaphoretic. No distress.   Cardiovascular: Intact distal pulses.   Pulses:       Dorsalis pedis pulses are 2+ on the left side.        Posterior tibial pulses are 2+ on the left side.   Musculoskeletal: Normal range of motion. Tenderness present. No edema.      Comments: +2 pulses. Warm and well perfused left lower extremity. Swelling and TTP to first metatarsal of the left foot.   Neurological: He is alert and oriented to person, place, and time. He has normal strength. No sensory deficit.   Skin: Skin is warm and dry. No rash and no abscess noted. No erythema.   Psychiatric: He has a normal mood and affect.         ED Course   Procedures  Labs Reviewed - No data to display       Imaging Results    None          Medical Decision Making:   History:   Old Medical Records: I decided to obtain old medical records.  ED Management:  Pt presents with pain to left 1st metatarsal x 2 days.   Unlikely fracture, dislocation.  Unlikely septic arthritis.  Unlikely gout flare, reactive arthritis, lupus arthropathy or other autoimmune etiology.  Well appearing, nonseptic, VSS.    Exam and history consistent with gout.  Will avoid steroids given diabetic and give NSAIDs.  Plan DC home with return precautions. Pt understands and agrees with discharge instructions. Pt also given strict return precautions for any new or worsening symptoms and plans to follow up closely with PCP.               Scribe Attestation:   Scribe #1: I performed the above scribed service and the documentation accurately describes the services I performed. I attest to the accuracy of the note.      Attending Attestation:     Physician Attestation for Scribe:    I, Dr. Philippe Handy, personally performed the services described in this documentation.   All medical record entries made by the scribe were at my direction and in my presence.   I have  reviewed the chart and agree that the record is accurate and complete.   Philippe Handy MD  12:03 AM 10/08/2020     DISCLAIMER: This note was prepared with [employer] voice recognition transcription software. Garbled syntax, mangled pronouns, and other bizarre constructions may be attributed to that software system.                    Clinical Impression:     ICD-10-CM ICD-9-CM   1. Acute gouty arthritis  M10.9 274.01                      Disposition:   Disposition: Discharged  Condition: Stable     ED Disposition Condition    Discharge Stable        ED Prescriptions     Medication Sig Dispense Start Date End Date Auth. Provider    indomethacin (INDOCIN) 50 MG capsule Take 1 capsule (50 mg total) by mouth 3 (three) times daily with meals. 15 capsule 10/7/2020  Philippe Handy MD        Follow-up Information     Follow up With Specialties Details Why Contact Info    Jhonny Avila MD Internal Medicine Go in 1 day  [address] [facility] [address]  [phone]      [facility] Emergency Medicine Go to  As needed, If symptoms worsen [address] [place] [id number] [phone]                                       Philippe Handy MD  10/08/20 8824

## 2021-02-02 ENCOUNTER — TELEPHONE (OUTPATIENT)
Dept: FAMILY MEDICINE | Facility: CLINIC | Age: 51
End: 2021-02-02

## 2021-03-17 ENCOUNTER — HOSPITAL ENCOUNTER (EMERGENCY)
Facility: HOSPITAL | Age: 51
Discharge: HOME | End: 2021-03-17
Payer: MEDICAID

## 2021-03-17 VITALS
BODY MASS INDEX: 42.38 KG/M2 | OXYGEN SATURATION: 96 % | RESPIRATION RATE: 16 BRPM | TEMPERATURE: 99 F | HEIGHT: 67 IN | DIASTOLIC BLOOD PRESSURE: 86 MMHG | WEIGHT: 270 LBS | SYSTOLIC BLOOD PRESSURE: 136 MMHG | HEART RATE: 94 BPM

## 2021-03-17 DIAGNOSIS — M10.9 ACUTE GOUTY ARTHRITIS: Primary | ICD-10-CM

## 2021-03-17 PROCEDURE — 63600175 PHARM REV CODE 636 W HCPCS: Performed by: NURSE PRACTITIONER

## 2021-03-17 PROCEDURE — 96372 THER/PROPH/DIAG INJ SC/IM: CPT

## 2021-03-17 PROCEDURE — 99284 EMERGENCY DEPT VISIT MOD MDM: CPT | Mod: 25

## 2021-03-17 RX ORDER — INDOMETHACIN 50 MG/1
50 CAPSULE ORAL
Qty: 15 CAPSULE | Refills: 0 | Status: SHIPPED | OUTPATIENT
Start: 2021-03-17 | End: 2021-04-05

## 2021-03-17 RX ORDER — KETOROLAC TROMETHAMINE 30 MG/ML
15 INJECTION, SOLUTION INTRAMUSCULAR; INTRAVENOUS
Status: COMPLETED | OUTPATIENT
Start: 2021-03-17 | End: 2021-03-17

## 2021-03-17 RX ADMIN — KETOROLAC TROMETHAMINE 15 MG: 30 INJECTION, SOLUTION INTRAMUSCULAR; INTRAVENOUS at 12:03

## 2021-04-01 ENCOUNTER — TELEPHONE (OUTPATIENT)
Dept: FAMILY MEDICINE | Facility: CLINIC | Age: 51
End: 2021-04-01

## 2021-04-05 ENCOUNTER — OFFICE VISIT (OUTPATIENT)
Dept: FAMILY MEDICINE | Facility: CLINIC | Age: 51
End: 2021-04-05
Payer: MEDICAID

## 2021-04-05 VITALS
SYSTOLIC BLOOD PRESSURE: 122 MMHG | DIASTOLIC BLOOD PRESSURE: 84 MMHG | HEART RATE: 78 BPM | BODY MASS INDEX: 41.07 KG/M2 | HEIGHT: 68 IN | WEIGHT: 271 LBS

## 2021-04-05 DIAGNOSIS — K21.9 GASTROESOPHAGEAL REFLUX DISEASE, UNSPECIFIED WHETHER ESOPHAGITIS PRESENT: ICD-10-CM

## 2021-04-05 DIAGNOSIS — Z12.5 SCREENING FOR PROSTATE CANCER: ICD-10-CM

## 2021-04-05 DIAGNOSIS — G89.29 CHRONIC MIDLINE LOW BACK PAIN WITHOUT SCIATICA: ICD-10-CM

## 2021-04-05 DIAGNOSIS — Z00.00 PHYSICAL EXAM: ICD-10-CM

## 2021-04-05 DIAGNOSIS — R73.01 IFG (IMPAIRED FASTING GLUCOSE): Primary | ICD-10-CM

## 2021-04-05 DIAGNOSIS — E11.69 TYPE 2 DIABETES MELLITUS WITH OTHER SPECIFIED COMPLICATION, UNSPECIFIED WHETHER LONG TERM INSULIN USE: ICD-10-CM

## 2021-04-05 DIAGNOSIS — M10.9 GOUT, UNSPECIFIED CAUSE, UNSPECIFIED CHRONICITY, UNSPECIFIED SITE: ICD-10-CM

## 2021-04-05 DIAGNOSIS — Z79.899 HIGH RISK MEDICATION USE: ICD-10-CM

## 2021-04-05 DIAGNOSIS — I10 BENIGN ESSENTIAL HTN: ICD-10-CM

## 2021-04-05 DIAGNOSIS — M54.50 CHRONIC MIDLINE LOW BACK PAIN WITHOUT SCIATICA: ICD-10-CM

## 2021-04-05 DIAGNOSIS — E78.5 HYPERLIPIDEMIA, UNSPECIFIED HYPERLIPIDEMIA TYPE: ICD-10-CM

## 2021-04-05 DIAGNOSIS — M25.50 ARTHRALGIA, UNSPECIFIED JOINT: ICD-10-CM

## 2021-04-05 LAB — HBA1C MFR BLD: 5.7 %

## 2021-04-05 PROCEDURE — 99204 OFFICE O/P NEW MOD 45 MIN: CPT | Mod: S$GLB,,, | Performed by: NURSE PRACTITIONER

## 2021-04-05 PROCEDURE — 83036 POCT HEMOGLOBIN A1C: ICD-10-PCS | Mod: QW,,, | Performed by: NURSE PRACTITIONER

## 2021-04-05 PROCEDURE — 99204 PR OFFICE/OUTPT VISIT, NEW, LEVL IV, 45-59 MIN: ICD-10-PCS | Mod: S$GLB,,, | Performed by: NURSE PRACTITIONER

## 2021-04-05 PROCEDURE — 83036 HEMOGLOBIN GLYCOSYLATED A1C: CPT | Mod: QW,,, | Performed by: NURSE PRACTITIONER

## 2021-04-05 RX ORDER — ALLOPURINOL 300 MG/1
TABLET ORAL
COMMUNITY
Start: 2021-01-15 | End: 2021-07-06 | Stop reason: SDUPTHER

## 2021-04-05 RX ORDER — COLCHICINE 0.6 MG/1
TABLET ORAL
COMMUNITY
Start: 2021-03-15 | End: 2021-07-06 | Stop reason: SDUPTHER

## 2021-04-06 ENCOUNTER — TELEPHONE (OUTPATIENT)
Dept: FAMILY MEDICINE | Facility: CLINIC | Age: 51
End: 2021-04-06

## 2021-04-06 RX ORDER — HYDROCODONE BITARTRATE AND ACETAMINOPHEN 5; 325 MG/1; MG/1
1 TABLET ORAL EVERY 12 HOURS PRN
Qty: 14 TABLET | Refills: 0 | Status: SHIPPED | OUTPATIENT
Start: 2021-04-06 | End: 2021-04-20 | Stop reason: SDUPTHER

## 2021-04-07 ENCOUNTER — TELEPHONE (OUTPATIENT)
Dept: FAMILY MEDICINE | Facility: CLINIC | Age: 51
End: 2021-04-07

## 2021-04-07 DIAGNOSIS — R76.8 POSITIVE ANA (ANTINUCLEAR ANTIBODY): Primary | ICD-10-CM

## 2021-04-07 LAB
ALBUMIN SERPL-MCNC: 4.2 G/DL (ref 3.6–5.1)
ALBUMIN/CREAT UR: 84 MCG/MG CREAT
ALBUMIN/GLOB SERPL: 1.7 (CALC) (ref 1–2.5)
ALP SERPL-CCNC: 98 U/L (ref 36–130)
ALT SERPL-CCNC: 18 U/L (ref 9–46)
ANA PAT SER IF-IMP: ABNORMAL
ANA SER QL IF: POSITIVE
ANA TITR SER IF: ABNORMAL TITER
APPEARANCE UR: CLEAR
AST SERPL-CCNC: 19 U/L (ref 10–40)
BACTERIA #/AREA URNS HPF: ABNORMAL /HPF
BACTERIA UR CULT: ABNORMAL
BASOPHILS # BLD AUTO: 68 CELLS/UL (ref 0–200)
BASOPHILS NFR BLD AUTO: 1.1 %
BILIRUB SERPL-MCNC: 0.8 MG/DL (ref 0.2–1.2)
BILIRUB UR QL STRIP: NEGATIVE
BUN SERPL-MCNC: 13 MG/DL (ref 7–25)
BUN/CREAT SERPL: ABNORMAL (CALC) (ref 6–22)
CALCIUM SERPL-MCNC: 9.3 MG/DL (ref 8.6–10.3)
CHLORIDE SERPL-SCNC: 108 MMOL/L (ref 98–110)
CHOLEST SERPL-MCNC: 163 MG/DL
CHOLEST/HDLC SERPL: 6 (CALC)
CO2 SERPL-SCNC: 28 MMOL/L (ref 20–32)
COLOR UR: YELLOW
CREAT SERPL-MCNC: 0.97 MG/DL (ref 0.6–1.35)
CREAT UR-MCNC: 266 MG/DL (ref 20–320)
EOSINOPHIL # BLD AUTO: 198 CELLS/UL (ref 15–500)
EOSINOPHIL NFR BLD AUTO: 3.2 %
ERYTHROCYTE [DISTWIDTH] IN BLOOD BY AUTOMATED COUNT: 12.6 % (ref 11–15)
ERYTHROCYTE [SEDIMENTATION RATE] IN BLOOD BY WESTERGREN METHOD: 2 MM/H
GFRSERPLBLD MDRD-ARVRAT: 91 ML/MIN/1.73M2
GLOBULIN SER CALC-MCNC: 2.5 G/DL (CALC) (ref 1.9–3.7)
GLUCOSE SERPL-MCNC: 141 MG/DL (ref 65–139)
GLUCOSE UR QL STRIP: NEGATIVE
HCT VFR BLD AUTO: 41.5 % (ref 38.5–50)
HDLC SERPL-MCNC: 27 MG/DL
HGB BLD-MCNC: 14.2 G/DL (ref 13.2–17.1)
HGB UR QL STRIP: NEGATIVE
HYALINE CASTS #/AREA URNS LPF: ABNORMAL /LPF
KETONES UR QL STRIP: NEGATIVE
LDLC SERPL CALC-MCNC: 102 MG/DL (CALC)
LEUKOCYTE ESTERASE UR QL STRIP: NEGATIVE
LYMPHOCYTES # BLD AUTO: 2610 CELLS/UL (ref 850–3900)
LYMPHOCYTES NFR BLD AUTO: 42.1 %
MCH RBC QN AUTO: 31.1 PG (ref 27–33)
MCHC RBC AUTO-ENTMCNC: 34.2 G/DL (ref 32–36)
MCV RBC AUTO: 90.8 FL (ref 80–100)
MICROALBUMIN UR-MCNC: 22.3 MG/DL
MONOCYTES # BLD AUTO: 409 CELLS/UL (ref 200–950)
MONOCYTES NFR BLD AUTO: 6.6 %
NEUTROPHILS # BLD AUTO: 2914 CELLS/UL (ref 1500–7800)
NEUTROPHILS NFR BLD AUTO: 47 %
NITRITE UR QL STRIP: NEGATIVE
NONHDLC SERPL-MCNC: 136 MG/DL (CALC)
PH UR STRIP: 5.5 [PH] (ref 5–8)
PLATELET # BLD AUTO: 228 THOUSAND/UL (ref 140–400)
PMV BLD REES-ECKER: 11.2 FL (ref 7.5–12.5)
POTASSIUM SERPL-SCNC: 3.9 MMOL/L (ref 3.5–5.3)
PROT SERPL-MCNC: 6.7 G/DL (ref 6.1–8.1)
PROT UR QL STRIP: ABNORMAL
PSA SERPL-MCNC: 0.3 NG/ML
RBC # BLD AUTO: 4.57 MILLION/UL (ref 4.2–5.8)
RBC #/AREA URNS HPF: ABNORMAL /HPF
RHEUMATOID FACT SERPL-ACNC: <14 IU/ML
SODIUM SERPL-SCNC: 142 MMOL/L (ref 135–146)
SP GR UR STRIP: 1.02 (ref 1–1.03)
SQUAMOUS #/AREA URNS HPF: ABNORMAL /HPF
TRIGL SERPL-MCNC: 219 MG/DL
TSH SERPL-ACNC: 1.54 MIU/L (ref 0.4–4.5)
URATE SERPL-MCNC: 7.2 MG/DL (ref 4–8)
WBC # BLD AUTO: 6.2 THOUSAND/UL (ref 3.8–10.8)
WBC #/AREA URNS HPF: ABNORMAL /HPF

## 2021-04-20 ENCOUNTER — OFFICE VISIT (OUTPATIENT)
Dept: FAMILY MEDICINE | Facility: CLINIC | Age: 51
End: 2021-04-20
Payer: MEDICAID

## 2021-04-20 VITALS
DIASTOLIC BLOOD PRESSURE: 80 MMHG | HEIGHT: 68 IN | BODY MASS INDEX: 41.52 KG/M2 | SYSTOLIC BLOOD PRESSURE: 122 MMHG | WEIGHT: 274 LBS | HEART RATE: 86 BPM

## 2021-04-20 DIAGNOSIS — B37.2 YEAST DERMATITIS: Primary | ICD-10-CM

## 2021-04-20 DIAGNOSIS — M25.50 ARTHRALGIA, UNSPECIFIED JOINT: ICD-10-CM

## 2021-04-20 PROCEDURE — 99213 PR OFFICE/OUTPT VISIT, EST, LEVL III, 20-29 MIN: ICD-10-PCS | Mod: S$GLB,,, | Performed by: NURSE PRACTITIONER

## 2021-04-20 PROCEDURE — 99213 OFFICE O/P EST LOW 20 MIN: CPT | Mod: S$GLB,,, | Performed by: NURSE PRACTITIONER

## 2021-04-20 RX ORDER — CLOTRIMAZOLE AND BETAMETHASONE DIPROPIONATE 10; .64 MG/G; MG/G
CREAM TOPICAL 2 TIMES DAILY
Qty: 45 G | Refills: 1 | Status: SHIPPED | OUTPATIENT
Start: 2021-04-20 | End: 2021-08-13 | Stop reason: SDUPTHER

## 2021-04-20 RX ORDER — METHYLPREDNISOLONE 4 MG/1
TABLET ORAL
Qty: 1 PACKAGE | Refills: 0 | Status: SHIPPED | OUTPATIENT
Start: 2021-04-20 | End: 2021-05-11

## 2021-04-20 RX ORDER — HYDROCODONE BITARTRATE AND ACETAMINOPHEN 5; 325 MG/1; MG/1
1 TABLET ORAL EVERY 12 HOURS PRN
Qty: 14 TABLET | Refills: 0 | Status: SHIPPED | OUTPATIENT
Start: 2021-04-20 | End: 2022-09-06

## 2021-04-20 RX ORDER — NYSTATIN 100000 [USP'U]/G
POWDER TOPICAL 2 TIMES DAILY
Qty: 60 G | Refills: 1 | Status: SHIPPED | OUTPATIENT
Start: 2021-04-20 | End: 2023-07-10

## 2021-04-21 ENCOUNTER — TELEPHONE (OUTPATIENT)
Dept: FAMILY MEDICINE | Facility: CLINIC | Age: 51
End: 2021-04-21

## 2021-04-27 ENCOUNTER — TELEPHONE (OUTPATIENT)
Dept: FAMILY MEDICINE | Facility: CLINIC | Age: 51
End: 2021-04-27

## 2021-05-04 ENCOUNTER — HOSPITAL ENCOUNTER (OUTPATIENT)
Dept: RADIOLOGY | Facility: HOSPITAL | Age: 51
Discharge: HOME | End: 2021-05-04
Attending: NURSE PRACTITIONER
Payer: MEDICAID

## 2021-05-04 ENCOUNTER — OFFICE VISIT (OUTPATIENT)
Dept: FAMILY MEDICINE | Facility: CLINIC | Age: 51
End: 2021-05-04
Payer: MEDICAID

## 2021-05-04 VITALS
SYSTOLIC BLOOD PRESSURE: 138 MMHG | BODY MASS INDEX: 41.07 KG/M2 | HEIGHT: 68 IN | HEART RATE: 84 BPM | DIASTOLIC BLOOD PRESSURE: 84 MMHG | WEIGHT: 271 LBS

## 2021-05-04 DIAGNOSIS — I10 BENIGN ESSENTIAL HTN: ICD-10-CM

## 2021-05-04 DIAGNOSIS — M54.12 CERVICAL RADICULOPATHY: ICD-10-CM

## 2021-05-04 DIAGNOSIS — R76.8 POSITIVE ANA (ANTINUCLEAR ANTIBODY): ICD-10-CM

## 2021-05-04 DIAGNOSIS — G89.29 CHRONIC RIGHT SHOULDER PAIN: Primary | ICD-10-CM

## 2021-05-04 DIAGNOSIS — M25.50 ARTHRALGIA, UNSPECIFIED JOINT: ICD-10-CM

## 2021-05-04 DIAGNOSIS — M25.511 CHRONIC RIGHT SHOULDER PAIN: Primary | ICD-10-CM

## 2021-05-04 DIAGNOSIS — G89.29 CHRONIC RIGHT SHOULDER PAIN: ICD-10-CM

## 2021-05-04 DIAGNOSIS — M25.511 CHRONIC RIGHT SHOULDER PAIN: ICD-10-CM

## 2021-05-04 DIAGNOSIS — E11.69 TYPE 2 DIABETES MELLITUS WITH OTHER SPECIFIED COMPLICATION, UNSPECIFIED WHETHER LONG TERM INSULIN USE: ICD-10-CM

## 2021-05-04 PROCEDURE — 99214 PR OFFICE/OUTPT VISIT, EST, LEVL IV, 30-39 MIN: ICD-10-PCS | Mod: S$GLB,,, | Performed by: NURSE PRACTITIONER

## 2021-05-04 PROCEDURE — 73030 X-RAY EXAM OF SHOULDER: CPT | Mod: TC,PO,RT

## 2021-05-04 PROCEDURE — 99214 OFFICE O/P EST MOD 30 MIN: CPT | Mod: S$GLB,,, | Performed by: NURSE PRACTITIONER

## 2021-05-04 PROCEDURE — 72040 X-RAY EXAM NECK SPINE 2-3 VW: CPT | Mod: TC,PO

## 2021-05-04 RX ORDER — MELOXICAM 15 MG/1
15 TABLET ORAL DAILY
Qty: 30 TABLET | Refills: 0 | Status: SHIPPED | OUTPATIENT
Start: 2021-05-04 | End: 2021-07-06 | Stop reason: SDUPTHER

## 2021-05-04 RX ORDER — TIZANIDINE 4 MG/1
4 TABLET ORAL EVERY 6 HOURS PRN
Qty: 30 TABLET | Refills: 0 | Status: SHIPPED | OUTPATIENT
Start: 2021-05-04 | End: 2021-05-14

## 2021-05-04 RX ORDER — KETOROLAC TROMETHAMINE 30 MG/ML
30 INJECTION, SOLUTION INTRAMUSCULAR; INTRAVENOUS
Status: COMPLETED | OUTPATIENT
Start: 2021-05-04 | End: 2021-05-04

## 2021-05-04 RX ADMIN — KETOROLAC TROMETHAMINE 30 MG: 30 INJECTION, SOLUTION INTRAMUSCULAR; INTRAVENOUS at 01:05

## 2021-05-05 ENCOUNTER — TELEPHONE (OUTPATIENT)
Dept: FAMILY MEDICINE | Facility: CLINIC | Age: 51
End: 2021-05-05

## 2021-05-06 ENCOUNTER — TELEPHONE (OUTPATIENT)
Dept: FAMILY MEDICINE | Facility: CLINIC | Age: 51
End: 2021-05-06

## 2021-05-07 ENCOUNTER — TELEPHONE (OUTPATIENT)
Dept: SPINE | Facility: CLINIC | Age: 51
End: 2021-05-07

## 2021-06-14 ENCOUNTER — TELEPHONE (OUTPATIENT)
Dept: FAMILY MEDICINE | Facility: CLINIC | Age: 51
End: 2021-06-14

## 2021-06-29 ENCOUNTER — TELEPHONE (OUTPATIENT)
Dept: FAMILY MEDICINE | Facility: CLINIC | Age: 51
End: 2021-06-29

## 2021-06-30 ENCOUNTER — TELEPHONE (OUTPATIENT)
Dept: FAMILY MEDICINE | Facility: CLINIC | Age: 51
End: 2021-06-30

## 2021-07-06 ENCOUNTER — TELEPHONE (OUTPATIENT)
Dept: FAMILY MEDICINE | Facility: CLINIC | Age: 51
End: 2021-07-06

## 2021-07-06 DIAGNOSIS — M54.12 CERVICAL RADICULOPATHY: ICD-10-CM

## 2021-07-06 DIAGNOSIS — M25.511 CHRONIC RIGHT SHOULDER PAIN: ICD-10-CM

## 2021-07-06 DIAGNOSIS — G89.29 CHRONIC RIGHT SHOULDER PAIN: ICD-10-CM

## 2021-07-06 RX ORDER — METFORMIN HYDROCHLORIDE 1000 MG/1
1000 TABLET ORAL 2 TIMES DAILY WITH MEALS
Qty: 180 TABLET | Refills: 1 | Status: SHIPPED | OUTPATIENT
Start: 2021-07-06 | End: 2022-08-16 | Stop reason: SDUPTHER

## 2021-07-06 RX ORDER — AMLODIPINE BESYLATE 10 MG/1
10 TABLET ORAL DAILY
Qty: 90 TABLET | Refills: 1 | Status: SHIPPED | OUTPATIENT
Start: 2021-07-06 | End: 2021-11-29

## 2021-07-06 RX ORDER — ALLOPURINOL 300 MG/1
300 TABLET ORAL DAILY
Qty: 90 TABLET | Refills: 1 | Status: SHIPPED | OUTPATIENT
Start: 2021-07-06 | End: 2024-04-02 | Stop reason: SDUPTHER

## 2021-07-06 RX ORDER — LOSARTAN POTASSIUM 25 MG/1
25 TABLET ORAL DAILY
Qty: 90 TABLET | Refills: 1 | Status: SHIPPED | OUTPATIENT
Start: 2021-07-06 | End: 2022-05-11 | Stop reason: SDUPTHER

## 2021-07-06 RX ORDER — COLCHICINE 0.6 MG/1
TABLET ORAL
Qty: 30 TABLET | Refills: 2 | Status: SHIPPED | OUTPATIENT
Start: 2021-07-06 | End: 2022-08-16 | Stop reason: SDUPTHER

## 2021-07-06 RX ORDER — MELOXICAM 15 MG/1
15 TABLET ORAL DAILY
Qty: 90 TABLET | Refills: 1 | Status: SHIPPED | OUTPATIENT
Start: 2021-07-06 | End: 2021-08-13 | Stop reason: SDUPTHER

## 2021-07-26 ENCOUNTER — OFFICE VISIT (OUTPATIENT)
Dept: RHEUMATOLOGY | Facility: CLINIC | Age: 51
End: 2021-07-26
Payer: MEDICAID

## 2021-07-26 ENCOUNTER — TELEPHONE (OUTPATIENT)
Dept: RHEUMATOLOGY | Facility: CLINIC | Age: 51
End: 2021-07-26

## 2021-07-26 ENCOUNTER — LAB VISIT (OUTPATIENT)
Dept: LAB | Facility: HOSPITAL | Age: 51
End: 2021-07-26
Attending: INTERNAL MEDICINE
Payer: MEDICAID

## 2021-07-26 VITALS
DIASTOLIC BLOOD PRESSURE: 82 MMHG | BODY MASS INDEX: 41.37 KG/M2 | WEIGHT: 272.13 LBS | SYSTOLIC BLOOD PRESSURE: 141 MMHG

## 2021-07-26 DIAGNOSIS — R76.8 POSITIVE ANA (ANTINUCLEAR ANTIBODY): ICD-10-CM

## 2021-07-26 DIAGNOSIS — Z79.899 ENCOUNTER FOR LONG-TERM (CURRENT) USE OF MEDICATIONS: Primary | ICD-10-CM

## 2021-07-26 DIAGNOSIS — R76.8 POSITIVE ANA (ANTINUCLEAR ANTIBODY): Primary | ICD-10-CM

## 2021-07-26 LAB — CK SERPL-CCNC: 454 U/L (ref 20–200)

## 2021-07-26 PROCEDURE — 84165 PROTEIN E-PHORESIS SERUM: CPT | Performed by: INTERNAL MEDICINE

## 2021-07-26 PROCEDURE — 99204 OFFICE O/P NEW MOD 45 MIN: CPT | Mod: S$GLB,,, | Performed by: INTERNAL MEDICINE

## 2021-07-26 PROCEDURE — 36415 COLL VENOUS BLD VENIPUNCTURE: CPT | Performed by: INTERNAL MEDICINE

## 2021-07-26 PROCEDURE — 82550 ASSAY OF CK (CPK): CPT | Performed by: INTERNAL MEDICINE

## 2021-07-26 PROCEDURE — 99204 PR OFFICE/OUTPT VISIT, NEW, LEVL IV, 45-59 MIN: ICD-10-PCS | Mod: S$GLB,,, | Performed by: INTERNAL MEDICINE

## 2021-07-26 PROCEDURE — 86235 NUCLEAR ANTIGEN ANTIBODY: CPT | Mod: 59 | Performed by: INTERNAL MEDICINE

## 2021-07-27 ENCOUNTER — LAB VISIT (OUTPATIENT)
Dept: LAB | Facility: HOSPITAL | Age: 51
End: 2021-07-27
Attending: INTERNAL MEDICINE
Payer: MEDICAID

## 2021-07-27 DIAGNOSIS — R76.8 POSITIVE ANA (ANTINUCLEAR ANTIBODY): ICD-10-CM

## 2021-07-27 DIAGNOSIS — Z79.899 ENCOUNTER FOR LONG-TERM (CURRENT) USE OF MEDICATIONS: ICD-10-CM

## 2021-07-27 DIAGNOSIS — R76.8 FALSE POSITIVE SEROLOGICAL TEST FOR SYPHILIS: Primary | ICD-10-CM

## 2021-07-27 LAB
ANA PANEL SEE BELOW:: ABNORMAL
CENTROMERE B AB SER-ACNC: <0.2 AI (ref 0–0.9)
CHROMATIN AB SERPL-ACNC: <0.2 AI (ref 0–0.9)
DSDNA AB SER-ACNC: <1 IU/ML (ref 0–9)
ENA JO1 AB SER-ACNC: <0.2 AI (ref 0–0.9)
ENA RNP AB SER-ACNC: 2.3 AI (ref 0–0.9)
ENA SCL70 AB SER-ACNC: <0.2 AI (ref 0–0.9)
ENA SM AB SER-ACNC: <0.2 AI (ref 0–0.9)
ENA SS-A AB SER-ACNC: <0.2 AI (ref 0–0.9)
ENA SS-B AB SER-ACNC: <0.2 AI (ref 0–0.9)

## 2021-07-27 PROCEDURE — 86255 FLUORESCENT ANTIBODY SCREEN: CPT

## 2021-07-27 PROCEDURE — 86225 DNA ANTIBODY NATIVE: CPT

## 2021-07-27 PROCEDURE — 82550 ASSAY OF CK (CPK): CPT | Performed by: INTERNAL MEDICINE

## 2021-07-27 PROCEDURE — 30000890 HC MISC. SEND OUT TEST

## 2021-07-27 PROCEDURE — 36415 COLL VENOUS BLD VENIPUNCTURE: CPT | Performed by: INTERNAL MEDICINE

## 2021-07-27 PROCEDURE — 82552 ASSAY OF CPK IN BLOOD: CPT | Performed by: INTERNAL MEDICINE

## 2021-07-27 PROCEDURE — 86800 THYROGLOBULIN ANTIBODY: CPT | Performed by: INTERNAL MEDICINE

## 2021-07-27 PROCEDURE — 83516 IMMUNOASSAY NONANTIBODY: CPT | Performed by: INTERNAL MEDICINE

## 2021-07-27 PROCEDURE — 86160 COMPLEMENT ANTIGEN: CPT

## 2021-07-27 PROCEDURE — 86038 ANTINUCLEAR ANTIBODIES: CPT

## 2021-07-27 PROCEDURE — 82085 ASSAY OF ALDOLASE: CPT | Performed by: INTERNAL MEDICINE

## 2021-07-27 PROCEDURE — 86038 ANTINUCLEAR ANTIBODIES: CPT | Performed by: INTERNAL MEDICINE

## 2021-07-27 PROCEDURE — 86235 NUCLEAR ANTIGEN ANTIBODY: CPT

## 2021-07-27 PROCEDURE — 83516 IMMUNOASSAY NONANTIBODY: CPT | Mod: 59 | Performed by: INTERNAL MEDICINE

## 2021-07-27 PROCEDURE — 86376 MICROSOMAL ANTIBODY EACH: CPT

## 2021-07-27 PROCEDURE — 83516 IMMUNOASSAY NONANTIBODY: CPT

## 2021-07-27 PROCEDURE — 81374 HLA I TYPING 1 ANTIGEN LR: CPT | Performed by: INTERNAL MEDICINE

## 2021-07-27 PROCEDURE — 86376 MICROSOMAL ANTIBODY EACH: CPT | Performed by: INTERNAL MEDICINE

## 2021-07-28 LAB
ALBUMIN SERPL ELPH-MCNC: 3.6 G/DL (ref 2.9–4.4)
ALBUMIN/GLOB SERPL: 1.2 {RATIO} (ref 0.7–1.7)
ALPHA1 GLOB SERPL ELPH-MCNC: 0.2 G/DL (ref 0–0.4)
ALPHA2 GLOB SERPL ELPH-MCNC: 0.7 G/DL (ref 0.4–1)
B-GLOBULIN SERPL ELPH-MCNC: 1.2 G/DL (ref 0.7–1.3)
GAMMA GLOB SERPL ELPH-MCNC: 1 G/DL (ref 0.4–1.8)
GLOBULIN SER CALC-MCNC: 3.1 G/DL (ref 2.2–3.9)
LABORATORY COMMENT REPORT: NORMAL
M PROTEIN SERPL ELPH-MCNC: NORMAL G/DL
PROT SERPL-MCNC: 6.7 G/DL (ref 6–8.5)

## 2021-07-28 RX ORDER — OMEPRAZOLE 40 MG/1
40 CAPSULE, DELAYED RELEASE ORAL DAILY
Qty: 90 CAPSULE | Refills: 1 | Status: SHIPPED | OUTPATIENT
Start: 2021-07-28 | End: 2021-08-13 | Stop reason: SDUPTHER

## 2021-07-29 LAB
ANA TITR SER IF: NORMAL {TITER}
THYROGLOB AB SERPL-ACNC: <1 IU/ML (ref 0–0.9)
THYROPEROXIDASE AB SERPL-ACNC: <8 IU/ML (ref 0–34)

## 2021-07-30 LAB
ALDOLASE SERPL-CCNC: 8.8 U/L (ref 3.3–10.3)
CK BB CFR SERPL ELPH: 0 %
CK MACRO1 CFR SERPL: 0 %
CK MACRO2 CFR SERPL: 0 %
CK MB CFR SERPL ELPH: 0 % (ref 0–3)
CK MM CFR SERPL ELPH: 100 % (ref 97–100)
CK SERPL-CCNC: 677 U/L (ref 49–439)

## 2021-07-31 LAB
ACTIN IGG SERPL-ACNC: 33 UNITS (ref 0–19)
LABCORP MISC TEST CODE: NORMAL
LABCORP MISC TEST NAME: NORMAL
LABCORP MISCELLANEOUS TEST: NORMAL
MITOCHONDRIA M2 IGG SER-ACNC: <20 UNITS (ref 0–20)

## 2021-08-03 LAB — HLA-B27 QL NAA+PROBE: NEGATIVE

## 2021-08-13 ENCOUNTER — OFFICE VISIT (OUTPATIENT)
Dept: SPINE | Facility: CLINIC | Age: 51
End: 2021-08-13
Payer: MEDICAID

## 2021-08-13 ENCOUNTER — OFFICE VISIT (OUTPATIENT)
Dept: FAMILY MEDICINE | Facility: CLINIC | Age: 51
End: 2021-08-13
Payer: MEDICAID

## 2021-08-13 VITALS — BODY MASS INDEX: 41.53 KG/M2 | WEIGHT: 274.06 LBS | HEIGHT: 68 IN

## 2021-08-13 VITALS
DIASTOLIC BLOOD PRESSURE: 88 MMHG | SYSTOLIC BLOOD PRESSURE: 138 MMHG | HEART RATE: 72 BPM | WEIGHT: 274 LBS | HEIGHT: 68 IN | BODY MASS INDEX: 41.52 KG/M2

## 2021-08-13 DIAGNOSIS — M25.511 BILATERAL SHOULDER PAIN, UNSPECIFIED CHRONICITY: ICD-10-CM

## 2021-08-13 DIAGNOSIS — M25.511 CHRONIC RIGHT SHOULDER PAIN: ICD-10-CM

## 2021-08-13 DIAGNOSIS — M25.512 BILATERAL SHOULDER PAIN, UNSPECIFIED CHRONICITY: ICD-10-CM

## 2021-08-13 DIAGNOSIS — M54.12 CERVICAL RADICULOPATHY: ICD-10-CM

## 2021-08-13 DIAGNOSIS — M54.2 CERVICALGIA: ICD-10-CM

## 2021-08-13 DIAGNOSIS — G89.29 CHRONIC RIGHT SHOULDER PAIN: ICD-10-CM

## 2021-08-13 DIAGNOSIS — M54.9 DORSALGIA, UNSPECIFIED: ICD-10-CM

## 2021-08-13 DIAGNOSIS — M54.42 CHRONIC LEFT-SIDED LOW BACK PAIN WITH LEFT-SIDED SCIATICA: Primary | ICD-10-CM

## 2021-08-13 DIAGNOSIS — J30.2 SEASONAL ALLERGIES: ICD-10-CM

## 2021-08-13 DIAGNOSIS — G89.29 CHRONIC LEFT-SIDED LOW BACK PAIN WITH LEFT-SIDED SCIATICA: Primary | ICD-10-CM

## 2021-08-13 DIAGNOSIS — K21.9 GASTROESOPHAGEAL REFLUX DISEASE, UNSPECIFIED WHETHER ESOPHAGITIS PRESENT: ICD-10-CM

## 2021-08-13 DIAGNOSIS — B37.2 YEAST DERMATITIS: ICD-10-CM

## 2021-08-13 DIAGNOSIS — E66.01 CLASS 3 SEVERE OBESITY DUE TO EXCESS CALORIES WITH SERIOUS COMORBIDITY AND BODY MASS INDEX (BMI) OF 40.0 TO 44.9 IN ADULT: ICD-10-CM

## 2021-08-13 DIAGNOSIS — E11.69 TYPE 2 DIABETES MELLITUS WITH OTHER SPECIFIED COMPLICATION, UNSPECIFIED WHETHER LONG TERM INSULIN USE: Primary | ICD-10-CM

## 2021-08-13 LAB — HBA1C MFR BLD: 5.5 %

## 2021-08-13 PROCEDURE — 83036 HEMOGLOBIN GLYCOSYLATED A1C: CPT | Mod: QW,,, | Performed by: NURSE PRACTITIONER

## 2021-08-13 PROCEDURE — 99214 OFFICE O/P EST MOD 30 MIN: CPT | Mod: S$GLB,,, | Performed by: NURSE PRACTITIONER

## 2021-08-13 PROCEDURE — 99204 PR OFFICE/OUTPT VISIT, NEW, LEVL IV, 45-59 MIN: ICD-10-PCS | Mod: S$GLB,,, | Performed by: PHYSICAL MEDICINE & REHABILITATION

## 2021-08-13 PROCEDURE — 99204 OFFICE O/P NEW MOD 45 MIN: CPT | Mod: S$GLB,,, | Performed by: PHYSICAL MEDICINE & REHABILITATION

## 2021-08-13 PROCEDURE — 99214 PR OFFICE/OUTPT VISIT, EST, LEVL IV, 30-39 MIN: ICD-10-PCS | Mod: S$GLB,,, | Performed by: NURSE PRACTITIONER

## 2021-08-13 PROCEDURE — 83036 POCT HEMOGLOBIN A1C: ICD-10-PCS | Mod: QW,,, | Performed by: NURSE PRACTITIONER

## 2021-08-13 RX ORDER — ALBUTEROL SULFATE 90 UG/1
AEROSOL, METERED RESPIRATORY (INHALATION)
Qty: 18 G | Refills: 2 | Status: SHIPPED | OUTPATIENT
Start: 2021-08-13 | End: 2023-04-17 | Stop reason: SDUPTHER

## 2021-08-13 RX ORDER — MELOXICAM 15 MG/1
15 TABLET ORAL DAILY
Qty: 90 TABLET | Refills: 1 | Status: SHIPPED | OUTPATIENT
Start: 2021-08-13 | End: 2021-08-13 | Stop reason: SDUPTHER

## 2021-08-13 RX ORDER — FLUTICASONE PROPIONATE 50 MCG
SPRAY, SUSPENSION (ML) NASAL
Qty: 16 G | Refills: 2 | Status: SHIPPED | OUTPATIENT
Start: 2021-08-13

## 2021-08-13 RX ORDER — TIZANIDINE 4 MG/1
4 TABLET ORAL EVERY 6 HOURS PRN
Qty: 30 TABLET | Refills: 1 | Status: SHIPPED | OUTPATIENT
Start: 2021-08-13 | End: 2021-08-23

## 2021-08-13 RX ORDER — MELOXICAM 15 MG/1
15 TABLET ORAL DAILY
Qty: 90 TABLET | Refills: 1 | Status: SHIPPED | OUTPATIENT
Start: 2021-08-13 | End: 2023-04-17 | Stop reason: SDUPTHER

## 2021-08-13 RX ORDER — CLOTRIMAZOLE AND BETAMETHASONE DIPROPIONATE 10; .64 MG/G; MG/G
CREAM TOPICAL 2 TIMES DAILY
Qty: 45 G | Refills: 1 | Status: SHIPPED | OUTPATIENT
Start: 2021-08-13

## 2021-08-13 RX ORDER — TRIAMCINOLONE ACETONIDE 1 MG/G
OINTMENT TOPICAL
Qty: 80 G | Refills: 1 | Status: SHIPPED | OUTPATIENT
Start: 2021-08-13

## 2021-08-13 RX ORDER — OMEPRAZOLE 40 MG/1
40 CAPSULE, DELAYED RELEASE ORAL DAILY
Qty: 90 CAPSULE | Refills: 1 | Status: SHIPPED | OUTPATIENT
Start: 2021-08-13 | End: 2022-08-16 | Stop reason: SDUPTHER

## 2021-09-10 ENCOUNTER — DOCUMENTATION ONLY (OUTPATIENT)
Dept: REHABILITATION | Facility: HOSPITAL | Age: 51
End: 2021-09-10

## 2021-10-12 ENCOUNTER — TELEPHONE (OUTPATIENT)
Dept: FAMILY MEDICINE | Facility: CLINIC | Age: 51
End: 2021-10-12

## 2021-10-14 ENCOUNTER — TELEPHONE (OUTPATIENT)
Dept: FAMILY MEDICINE | Facility: CLINIC | Age: 51
End: 2021-10-14

## 2021-11-19 ENCOUNTER — TELEPHONE (OUTPATIENT)
Dept: FAMILY MEDICINE | Facility: CLINIC | Age: 51
End: 2021-11-19
Payer: MEDICAID

## 2021-11-29 ENCOUNTER — TELEPHONE (OUTPATIENT)
Dept: FAMILY MEDICINE | Facility: CLINIC | Age: 51
End: 2021-11-29
Payer: MEDICAID

## 2021-11-29 ENCOUNTER — OFFICE VISIT (OUTPATIENT)
Dept: FAMILY MEDICINE | Facility: CLINIC | Age: 51
End: 2021-11-29
Payer: MEDICAID

## 2021-11-29 VITALS
HEIGHT: 68 IN | BODY MASS INDEX: 42.59 KG/M2 | DIASTOLIC BLOOD PRESSURE: 80 MMHG | HEART RATE: 90 BPM | WEIGHT: 281 LBS | SYSTOLIC BLOOD PRESSURE: 138 MMHG

## 2021-11-29 DIAGNOSIS — M10.9 GOUT, UNSPECIFIED CAUSE, UNSPECIFIED CHRONICITY, UNSPECIFIED SITE: ICD-10-CM

## 2021-11-29 DIAGNOSIS — E78.5 HYPERLIPIDEMIA, UNSPECIFIED HYPERLIPIDEMIA TYPE: ICD-10-CM

## 2021-11-29 DIAGNOSIS — I10 BENIGN ESSENTIAL HTN: ICD-10-CM

## 2021-11-29 DIAGNOSIS — E11.69 TYPE 2 DIABETES MELLITUS WITH OTHER SPECIFIED COMPLICATION, UNSPECIFIED WHETHER LONG TERM INSULIN USE: Primary | ICD-10-CM

## 2021-11-29 DIAGNOSIS — R60.9 EDEMA, UNSPECIFIED TYPE: ICD-10-CM

## 2021-11-29 LAB — HBA1C MFR BLD: 5.8 %

## 2021-11-29 PROCEDURE — 99214 OFFICE O/P EST MOD 30 MIN: CPT | Mod: S$GLB,,, | Performed by: NURSE PRACTITIONER

## 2021-11-29 PROCEDURE — 83036 POCT HEMOGLOBIN A1C: ICD-10-PCS | Mod: QW,,, | Performed by: NURSE PRACTITIONER

## 2021-11-29 PROCEDURE — 83036 HEMOGLOBIN GLYCOSYLATED A1C: CPT | Mod: QW,,, | Performed by: NURSE PRACTITIONER

## 2021-11-29 PROCEDURE — 99214 PR OFFICE/OUTPT VISIT, EST, LEVL IV, 30-39 MIN: ICD-10-PCS | Mod: S$GLB,,, | Performed by: NURSE PRACTITIONER

## 2021-11-29 RX ORDER — AMLODIPINE BESYLATE 5 MG/1
5 TABLET ORAL DAILY
Qty: 30 TABLET | Refills: 11 | Status: SHIPPED | OUTPATIENT
Start: 2021-11-29 | End: 2022-05-11 | Stop reason: SDUPTHER

## 2021-11-29 RX ORDER — CHLORTHALIDONE 25 MG/1
25 TABLET ORAL DAILY
Qty: 30 TABLET | Refills: 11 | Status: SHIPPED | OUTPATIENT
Start: 2021-11-29 | End: 2022-08-16 | Stop reason: SDUPTHER

## 2021-12-28 ENCOUNTER — HOSPITAL ENCOUNTER (EMERGENCY)
Facility: HOSPITAL | Age: 51
Discharge: HOME | End: 2021-12-28
Attending: EMERGENCY MEDICINE
Payer: MEDICAID

## 2021-12-28 VITALS
DIASTOLIC BLOOD PRESSURE: 112 MMHG | WEIGHT: 270 LBS | HEART RATE: 86 BPM | RESPIRATION RATE: 19 BRPM | BODY MASS INDEX: 42.38 KG/M2 | OXYGEN SATURATION: 98 % | TEMPERATURE: 98 F | HEIGHT: 67 IN | SYSTOLIC BLOOD PRESSURE: 167 MMHG

## 2021-12-28 DIAGNOSIS — I10 HYPERTENSION, UNSPECIFIED TYPE: ICD-10-CM

## 2021-12-28 DIAGNOSIS — U07.1 COVID-19 VIRUS INFECTION: Primary | ICD-10-CM

## 2021-12-28 LAB
INFLUENZA A, MOLECULAR: NEGATIVE
INFLUENZA B, MOLECULAR: NEGATIVE
SARS-COV-2 RDRP RESP QL NAA+PROBE: POSITIVE
SPECIMEN SOURCE: NORMAL

## 2021-12-28 PROCEDURE — 87502 INFLUENZA DNA AMP PROBE: CPT | Performed by: EMERGENCY MEDICINE

## 2021-12-28 PROCEDURE — U0002 COVID-19 LAB TEST NON-CDC: HCPCS | Performed by: EMERGENCY MEDICINE

## 2021-12-28 PROCEDURE — 99282 EMERGENCY DEPT VISIT SF MDM: CPT

## 2021-12-29 ENCOUNTER — PATIENT MESSAGE (OUTPATIENT)
Dept: ADMINISTRATIVE | Facility: OTHER | Age: 51
End: 2021-12-29
Payer: MEDICAID

## 2021-12-29 ENCOUNTER — TELEPHONE (OUTPATIENT)
Dept: ADMINISTRATIVE | Facility: CLINIC | Age: 51
End: 2021-12-29
Payer: MEDICAID

## 2021-12-29 ENCOUNTER — PATIENT MESSAGE (OUTPATIENT)
Dept: ADMINISTRATIVE | Facility: CLINIC | Age: 51
End: 2021-12-29
Payer: MEDICAID

## 2021-12-29 NOTE — ED PROVIDER NOTES
Encounter Date: 12/28/2021       History     Chief Complaint   Patient presents with    COVID-19 Concerns     Patient reports 3 day history of nonproductive cough and congestion.  Patient had subjective fever and chills 2 days ago but now resolved.  No shortness of breath.  Patient does have a history of asthma.  Patient denies any chest pain, pleurisy hemoptysis.        Review of patient's allergies indicates:  No Known Allergies  Past Medical History:   Diagnosis Date    Allergy     Asthma     Diabetes mellitus     Diabetes mellitus, type 2     GERD (gastroesophageal reflux disease)     Gout     Gout     Hyperlipidemia     Hypertension     Lupus     Sleep apnea     Sleep apnea      Past Surgical History:   Procedure Laterality Date    BACK SURGERY      LIPOMA RESECTION      ROTATOR CUFF REPAIR Left      Family History   Problem Relation Age of Onset    Heart disease Mother     Diabetes Father     Hypertension Father     Cancer Father     Atrial fibrillation Brother     Heart disease Brother      Social History     Tobacco Use    Smoking status: Heavy Tobacco Smoker     Packs/day: 1.00     Types: Cigarettes    Smokeless tobacco: Never Used   Substance Use Topics    Alcohol use: No    Drug use: No     Review of Systems   Constitutional: Positive for chills and fever.   HENT: Positive for congestion. Negative for sore throat.    Eyes: Negative for photophobia and visual disturbance.   Respiratory: Positive for cough. Negative for shortness of breath.    Cardiovascular: Negative for chest pain.   Gastrointestinal: Negative for abdominal pain and vomiting.   Genitourinary: Negative for dysuria.   Musculoskeletal: Negative for joint swelling.   Skin: Negative for rash.   Neurological: Negative for weakness and headaches.   Psychiatric/Behavioral: Negative for confusion.       Physical Exam     Initial Vitals [12/28/21 2256]   BP Pulse Resp Temp SpO2   (!) 182/110 86 20 98.2 °F (36.8 °C) 97 %       MAP       --         Physical Exam    Nursing note and vitals reviewed.  Constitutional: He is not diaphoretic. No distress.   HENT:   Head: Normocephalic and atraumatic.   Mouth/Throat: Oropharynx is clear and moist. No oropharyngeal exudate.   Eyes: Conjunctivae are normal.   Neck:   Normal range of motion.  Cardiovascular: Regular rhythm.   Pulmonary/Chest: Breath sounds normal.   Abdominal: Abdomen is soft. There is no abdominal tenderness.   Musculoskeletal:         General: Normal range of motion.      Cervical back: Normal range of motion.     Skin: No rash noted.   Psychiatric: He has a normal mood and affect.         ED Course   Procedures  Labs Reviewed   SARS-COV-2 RNA AMPLIFICATION, QUAL - Abnormal; Notable for the following components:       Result Value    SARS-CoV-2 RNA, Amplification, Qual Positive (*)     All other components within normal limits    Narrative:     Positive Covid critical result(s) called and verbal readback obtained   from Sahara Escamilla in ED by JOHN 12/28/2021 23:30   INFLUENZA A AND B ANTIGEN    Narrative:     Specimen Source->Nasopharyngeal Swab          Imaging Results    None          Medications - No data to display  Medical Decision Making:   History:   Old Medical Records: I decided to obtain old medical records.  Clinical Tests:   Lab Tests: Reviewed  ED Management:  Patient presents with symptoms of COVID infection.  Patient is COVID positive.  O2 saturation 98%.  Vital signs are stable except for some accelerated hypertension.  Will have patient recheck blood pressure in 48 hours.  Given comorbidity of asthma will enroll in COVID surveillance program.                      Clinical Impression:   Final diagnoses:  [U07.1] COVID-19 virus infection (Primary)  [I10] Hypertension, unspecified type          ED Disposition Condition    Discharge Stable        ED Prescriptions     None        Follow-up Information     Follow up With Specialties Details Why Contact Info  Additional Information    [facility] - Emergency Dept Emergency Medicine Schedule an appointment as soon as possible for a visit   [address] [facility] [facility] [address]  [phone] 1st floor    Idalia Hercules NP Family Medicine Schedule an appointment as soon as possible for a visit   [address]  [address]  [facility] [address]  [phone]              Roberto Silva MD  12/28/21 7214

## 2021-12-30 ENCOUNTER — PATIENT MESSAGE (OUTPATIENT)
Dept: ADMINISTRATIVE | Facility: CLINIC | Age: 51
End: 2021-12-30
Payer: MEDICAID

## 2021-12-30 ENCOUNTER — PATIENT MESSAGE (OUTPATIENT)
Dept: ADMINISTRATIVE | Facility: OTHER | Age: 51
End: 2021-12-30
Payer: MEDICAID

## 2021-12-31 ENCOUNTER — NURSE TRIAGE (OUTPATIENT)
Dept: ADMINISTRATIVE | Facility: CLINIC | Age: 51
End: 2021-12-31
Payer: MEDICAID

## 2021-12-31 ENCOUNTER — PATIENT MESSAGE (OUTPATIENT)
Dept: ADMINISTRATIVE | Facility: OTHER | Age: 51
End: 2021-12-31
Payer: MEDICAID

## 2022-01-01 ENCOUNTER — PATIENT MESSAGE (OUTPATIENT)
Dept: ADMINISTRATIVE | Facility: OTHER | Age: 52
End: 2022-01-01
Payer: MEDICAID

## 2022-01-01 ENCOUNTER — PATIENT MESSAGE (OUTPATIENT)
Dept: ADMINISTRATIVE | Facility: CLINIC | Age: 52
End: 2022-01-01
Payer: MEDICAID

## 2022-01-02 ENCOUNTER — PATIENT MESSAGE (OUTPATIENT)
Dept: ADMINISTRATIVE | Facility: OTHER | Age: 52
End: 2022-01-02
Payer: MEDICAID

## 2022-01-03 ENCOUNTER — PATIENT MESSAGE (OUTPATIENT)
Dept: ADMINISTRATIVE | Facility: CLINIC | Age: 52
End: 2022-01-03
Payer: MEDICAID

## 2022-01-03 ENCOUNTER — PATIENT MESSAGE (OUTPATIENT)
Dept: ADMINISTRATIVE | Facility: OTHER | Age: 52
End: 2022-01-03
Payer: MEDICAID

## 2022-01-03 ENCOUNTER — HOSPITAL ENCOUNTER (OUTPATIENT)
Dept: RADIOLOGY | Facility: HOSPITAL | Age: 52
Discharge: HOME | End: 2022-01-03
Attending: PHYSICAL MEDICINE & REHABILITATION
Payer: MEDICAID

## 2022-01-03 DIAGNOSIS — M54.9 DORSALGIA, UNSPECIFIED: ICD-10-CM

## 2022-01-03 PROCEDURE — 72110 XR LUMBAR SPINE 5 VIEW WITH FLEX AND EXT: ICD-10-PCS | Mod: 26,,, | Performed by: RADIOLOGY

## 2022-01-03 PROCEDURE — 72110 X-RAY EXAM L-2 SPINE 4/>VWS: CPT | Mod: 26,,, | Performed by: RADIOLOGY

## 2022-01-03 PROCEDURE — 72110 X-RAY EXAM L-2 SPINE 4/>VWS: CPT | Mod: TC,FY

## 2022-01-04 ENCOUNTER — PATIENT MESSAGE (OUTPATIENT)
Dept: ADMINISTRATIVE | Facility: OTHER | Age: 52
End: 2022-01-04
Payer: MEDICAID

## 2022-01-04 ENCOUNTER — PATIENT MESSAGE (OUTPATIENT)
Dept: ADMINISTRATIVE | Facility: CLINIC | Age: 52
End: 2022-01-04
Payer: MEDICAID

## 2022-01-04 ENCOUNTER — TELEPHONE (OUTPATIENT)
Dept: PHYSICAL MEDICINE AND REHAB | Facility: CLINIC | Age: 52
End: 2022-01-04
Payer: MEDICAID

## 2022-01-05 ENCOUNTER — PATIENT MESSAGE (OUTPATIENT)
Dept: ADMINISTRATIVE | Facility: CLINIC | Age: 52
End: 2022-01-05
Payer: MEDICAID

## 2022-01-06 ENCOUNTER — NURSE TRIAGE (OUTPATIENT)
Dept: ADMINISTRATIVE | Facility: CLINIC | Age: 52
End: 2022-01-06
Payer: MEDICAID

## 2022-01-06 ENCOUNTER — PATIENT MESSAGE (OUTPATIENT)
Dept: ADMINISTRATIVE | Facility: CLINIC | Age: 52
End: 2022-01-06
Payer: MEDICAID

## 2022-01-06 NOTE — TELEPHONE ENCOUNTER
Pt has had 4 no response in a row therefore pt will be unenrolled in the Covid Surveillance Program d/t non-compliance with program.  Tasks removed.  [id number] ordered.  [employer] message sent.    Reason for Disposition   Caller has cancelled the call before the first contact    Additional Information   Negative: Caller is angry or rude (e.g., hangs up, verbally abusive, yelling)   Negative: Caller hangs up   Negative: Caller has already spoken with the PCP and has no further questions.   Negative: Caller has already spoken with another triager and has no further questions.   Negative: Caller has already spoken with another triager or PCP AND has further questions AND triager able to answer questions.   Negative: Busy signal.  First attempt to contact caller.  Follow-up call scheduled within 15 minutes.   Negative: No answer.  First attempt to contact caller.  Follow-up call scheduled within 15 minutes.   Negative: Message left on identified voice mail   Negative: Message left on unidentified voice mail.  Phone number verified.   Negative: Message left with person in household.   Negative: Wrong number reached.  Phone number verified.   Negative: Second attempt to contact caller AND no contact made. Phone number verified.   Negative: Third attempt to contact caller AND no contact made. Phone number verified.   Negative: Cell phone out of range.  Phone number verified.   Negative: Pager number given.  Answering service notified.   Negative: Patient already left for the hospital/clinic.    Protocols used: NO CONTACT OR DUPLICATE CONTACT CALL-A-

## 2022-01-07 NOTE — TELEPHONE ENCOUNTER
Spoke to pt he states he is feeling very good. He is taking OTC meds for symptoms. Pt denies any SOB or difficulty breathing.

## 2022-01-13 ENCOUNTER — PATIENT MESSAGE (OUTPATIENT)
Dept: SPINE | Facility: CLINIC | Age: 52
End: 2022-01-13
Payer: MEDICAID

## 2022-04-12 ENCOUNTER — OFFICE VISIT (OUTPATIENT)
Dept: RHEUMATOLOGY | Facility: CLINIC | Age: 52
End: 2022-04-12
Payer: MEDICAID

## 2022-04-12 VITALS
WEIGHT: 280 LBS | HEART RATE: 86 BPM | BODY MASS INDEX: 43.85 KG/M2 | SYSTOLIC BLOOD PRESSURE: 172 MMHG | DIASTOLIC BLOOD PRESSURE: 85 MMHG

## 2022-04-12 DIAGNOSIS — K75.4 AUTOIMMUNE HEPATITIS: Primary | ICD-10-CM

## 2022-04-12 PROCEDURE — 3077F PR MOST RECENT SYSTOLIC BLOOD PRESSURE >= 140 MM HG: ICD-10-PCS | Mod: S$GLB,,, | Performed by: INTERNAL MEDICINE

## 2022-04-12 PROCEDURE — 3008F PR BODY MASS INDEX (BMI) DOCUMENTED: ICD-10-PCS | Mod: S$GLB,,, | Performed by: INTERNAL MEDICINE

## 2022-04-12 PROCEDURE — 99214 OFFICE O/P EST MOD 30 MIN: CPT | Mod: S$GLB,,, | Performed by: INTERNAL MEDICINE

## 2022-04-12 PROCEDURE — 3079F PR MOST RECENT DIASTOLIC BLOOD PRESSURE 80-89 MM HG: ICD-10-PCS | Mod: S$GLB,,, | Performed by: INTERNAL MEDICINE

## 2022-04-12 PROCEDURE — 1160F PR REVIEW ALL MEDS BY PRESCRIBER/CLIN PHARMACIST DOCUMENTED: ICD-10-PCS | Mod: S$GLB,,, | Performed by: INTERNAL MEDICINE

## 2022-04-12 PROCEDURE — 3008F BODY MASS INDEX DOCD: CPT | Mod: S$GLB,,, | Performed by: INTERNAL MEDICINE

## 2022-04-12 PROCEDURE — 3079F DIAST BP 80-89 MM HG: CPT | Mod: S$GLB,,, | Performed by: INTERNAL MEDICINE

## 2022-04-12 PROCEDURE — 99214 PR OFFICE/OUTPT VISIT, EST, LEVL IV, 30-39 MIN: ICD-10-PCS | Mod: S$GLB,,, | Performed by: INTERNAL MEDICINE

## 2022-04-12 PROCEDURE — 3077F SYST BP >= 140 MM HG: CPT | Mod: S$GLB,,, | Performed by: INTERNAL MEDICINE

## 2022-04-12 PROCEDURE — 1160F RVW MEDS BY RX/DR IN RCRD: CPT | Mod: S$GLB,,, | Performed by: INTERNAL MEDICINE

## 2022-04-12 NOTE — PROGRESS NOTES
[facility] RHEUMATOLOGY           Follow-up visit    Notes dictated to M*Modal. Please forgive any unintended errors.  Subjective:       Patient ID:   NAME: Pierre Masters Jr. : 1970     51 y.o. male    Referring Doc: No ref. provider found  Other Physicians:    Chief Complaint:  Follow-up      HPI/Interval History:  The patient presents after a 10 month absence.  He says he came in because of his blood test.  I reviewed with him the findings from his 1st visit and how they pointed to an autoimmune hepatitis.  He seems to recall that discussion.      Labs:   Lab Results   Component Value Date    WBC 6.2 2021    HGB 14.2 2021    HCT 41.5 2021    MCV 90.8 2021     2021   CMP@  Sodium   Date Value Ref Range Status   2021 142 135 - 146 mmol/L Final     Potassium   Date Value Ref Range Status   2021 3.9 3.5 - 5.3 mmol/L Final     Chloride   Date Value Ref Range Status   2021 108 98 - 110 mmol/L Final     CO2   Date Value Ref Range Status   2021 28 20 - 32 mmol/L Final     Glucose   Date Value Ref Range Status   2021 141 (H) 65 - 139 mg/dL Final     Comment:               Non-fasting reference interval          BUN   Date Value Ref Range Status   2021 13 7 - 25 mg/dL Final     Creatinine   Date Value Ref Range Status   2021 0.97 0.60 - 1.35 mg/dL Final     Calcium   Date Value Ref Range Status   2021 9.3 8.6 - 10.3 mg/dL Final     Total Protein   Date Value Ref Range Status   2021 6.7 6.0 - 8.5 g/dL Final     Albumin   Date Value Ref Range Status   2021 4.2 3.6 - 5.1 g/dL Final     Total Bilirubin   Date Value Ref Range Status   2021 0.8 0.2 - 1.2 mg/dL Final     Alkaline Phosphatase   Date Value Ref Range Status   2019 111 55 - 135 U/L Final     AST   Date Value Ref Range Status   2021 19 10 - 40 U/L Final     ALT   Date Value Ref Range Status   2021 18 9 - 46 U/L Final     CPK   Date  Value Ref Range Status   07/27/2021 677 (H) 49 - 439 U/L Final     GILDARDO   Date Value Ref Range Status   07/27/2021 Comment  Final     Comment:     Negative for Antinuclear Antibody but  cytoplasmic fluorescence is present.  Mitochondrial and smooth muscle antibodies  are the two most common autobodies to  cytoplasmic antigens and are generally  associated with mononucleosis, chronic  active hepatitis and liver disease.  Negative   <1:80  Borderline  1:80  Positive   >1:80  Performed at:  [facility]  [id number]  : Dontae Lara MD, Phone:  [phone]       Rheumatoid Factor   Date Value Ref Range Status   04/05/2021 <14 <14 IU/mL Final     Uric Acid   Date Value Ref Range Status   04/05/2021 7.2 4.0 - 8.0 mg/dL Final     Comment:     Therapeutic target for gout patients: <6.0 mg/dL             Radiology/Diagnostic Studies:    None    Assessment/Discussion/Plan:   51 y.o. male with a positive GILDARDO with a positive smooth muscle antibody-seen by GI but possibly lost to follow-up    PLAN:  I have explained to him in detail the seriousness of autoimmune liver disease.  He promises to contact the gastroenterologist he saw in to get back into treatment.  He does not have a rheumatologic disease.    RTC:  I will see him back on a re-referral basis        Electronically signed by Anuel Velásquez MD

## 2022-05-02 ENCOUNTER — TELEPHONE (OUTPATIENT)
Dept: FAMILY MEDICINE | Facility: CLINIC | Age: 52
End: 2022-05-02

## 2022-05-02 NOTE — TELEPHONE ENCOUNTER
----- Message from Helen Willams sent at 5/2/2022  8:34 AM CDT -----  Pt needs refill on all of his medications  [facility] on front   [phone]

## 2022-05-02 NOTE — TELEPHONE ENCOUNTER
Spoke with pt he is past due for an appt. [facility] doesn't have anything this week. Does she want to work him in? He says he is out of everything.

## 2022-05-11 ENCOUNTER — OFFICE VISIT (OUTPATIENT)
Dept: FAMILY MEDICINE | Facility: CLINIC | Age: 52
End: 2022-05-11
Payer: MEDICAID

## 2022-05-11 VITALS
SYSTOLIC BLOOD PRESSURE: 160 MMHG | TEMPERATURE: 98 F | HEIGHT: 67 IN | BODY MASS INDEX: 44.26 KG/M2 | DIASTOLIC BLOOD PRESSURE: 96 MMHG | HEART RATE: 80 BPM | WEIGHT: 282 LBS

## 2022-05-11 DIAGNOSIS — E11.69 TYPE 2 DIABETES MELLITUS WITH OTHER SPECIFIED COMPLICATION, UNSPECIFIED WHETHER LONG TERM INSULIN USE: Primary | ICD-10-CM

## 2022-05-11 DIAGNOSIS — I10 BENIGN ESSENTIAL HTN: ICD-10-CM

## 2022-05-11 DIAGNOSIS — E78.5 HYPERLIPIDEMIA, UNSPECIFIED HYPERLIPIDEMIA TYPE: ICD-10-CM

## 2022-05-11 DIAGNOSIS — Z12.11 SPECIAL SCREENING FOR MALIGNANT NEOPLASMS, COLON: ICD-10-CM

## 2022-05-11 DIAGNOSIS — N52.9 ERECTILE DYSFUNCTION, UNSPECIFIED ERECTILE DYSFUNCTION TYPE: ICD-10-CM

## 2022-05-11 DIAGNOSIS — E66.01 CLASS 3 SEVERE OBESITY DUE TO EXCESS CALORIES WITH SERIOUS COMORBIDITY AND BODY MASS INDEX (BMI) OF 40.0 TO 44.9 IN ADULT: ICD-10-CM

## 2022-05-11 DIAGNOSIS — J30.2 SEASONAL ALLERGIES: ICD-10-CM

## 2022-05-11 DIAGNOSIS — R76.8 POSITIVE ANA (ANTINUCLEAR ANTIBODY): ICD-10-CM

## 2022-05-11 DIAGNOSIS — Z12.5 PROSTATE CANCER SCREENING: ICD-10-CM

## 2022-05-11 LAB — HBA1C MFR BLD: 5.8 %

## 2022-05-11 PROCEDURE — 99214 OFFICE O/P EST MOD 30 MIN: CPT | Mod: S$GLB,,, | Performed by: PHYSICIAN ASSISTANT

## 2022-05-11 PROCEDURE — 1159F PR MEDICATION LIST DOCUMENTED IN MEDICAL RECORD: ICD-10-PCS | Mod: CPTII,S$GLB,, | Performed by: PHYSICIAN ASSISTANT

## 2022-05-11 PROCEDURE — 3077F SYST BP >= 140 MM HG: CPT | Mod: CPTII,S$GLB,, | Performed by: PHYSICIAN ASSISTANT

## 2022-05-11 PROCEDURE — 4010F ACE/ARB THERAPY RXD/TAKEN: CPT | Mod: CPTII,S$GLB,, | Performed by: PHYSICIAN ASSISTANT

## 2022-05-11 PROCEDURE — 83036 POCT HEMOGLOBIN A1C: ICD-10-PCS | Mod: QW,,, | Performed by: PHYSICIAN ASSISTANT

## 2022-05-11 PROCEDURE — 3080F DIAST BP >= 90 MM HG: CPT | Mod: CPTII,S$GLB,, | Performed by: PHYSICIAN ASSISTANT

## 2022-05-11 PROCEDURE — 3008F PR BODY MASS INDEX (BMI) DOCUMENTED: ICD-10-PCS | Mod: CPTII,S$GLB,, | Performed by: PHYSICIAN ASSISTANT

## 2022-05-11 PROCEDURE — 1160F RVW MEDS BY RX/DR IN RCRD: CPT | Mod: CPTII,S$GLB,, | Performed by: PHYSICIAN ASSISTANT

## 2022-05-11 PROCEDURE — 3008F BODY MASS INDEX DOCD: CPT | Mod: CPTII,S$GLB,, | Performed by: PHYSICIAN ASSISTANT

## 2022-05-11 PROCEDURE — 3044F HG A1C LEVEL LT 7.0%: CPT | Mod: CPTII,S$GLB,, | Performed by: PHYSICIAN ASSISTANT

## 2022-05-11 PROCEDURE — 3077F PR MOST RECENT SYSTOLIC BLOOD PRESSURE >= 140 MM HG: ICD-10-PCS | Mod: CPTII,S$GLB,, | Performed by: PHYSICIAN ASSISTANT

## 2022-05-11 PROCEDURE — 1160F PR REVIEW ALL MEDS BY PRESCRIBER/CLIN PHARMACIST DOCUMENTED: ICD-10-PCS | Mod: CPTII,S$GLB,, | Performed by: PHYSICIAN ASSISTANT

## 2022-05-11 PROCEDURE — 99214 PR OFFICE/OUTPT VISIT, EST, LEVL IV, 30-39 MIN: ICD-10-PCS | Mod: S$GLB,,, | Performed by: PHYSICIAN ASSISTANT

## 2022-05-11 PROCEDURE — 4010F PR ACE/ARB THEARPY RXD/TAKEN: ICD-10-PCS | Mod: CPTII,S$GLB,, | Performed by: PHYSICIAN ASSISTANT

## 2022-05-11 PROCEDURE — 1159F MED LIST DOCD IN RCRD: CPT | Mod: CPTII,S$GLB,, | Performed by: PHYSICIAN ASSISTANT

## 2022-05-11 PROCEDURE — 83036 HEMOGLOBIN GLYCOSYLATED A1C: CPT | Mod: QW,,, | Performed by: PHYSICIAN ASSISTANT

## 2022-05-11 PROCEDURE — 3080F PR MOST RECENT DIASTOLIC BLOOD PRESSURE >= 90 MM HG: ICD-10-PCS | Mod: CPTII,S$GLB,, | Performed by: PHYSICIAN ASSISTANT

## 2022-05-11 PROCEDURE — 3044F PR MOST RECENT HEMOGLOBIN A1C LEVEL <7.0%: ICD-10-PCS | Mod: CPTII,S$GLB,, | Performed by: PHYSICIAN ASSISTANT

## 2022-05-11 RX ORDER — CETIRIZINE HYDROCHLORIDE 10 MG/1
10 TABLET ORAL DAILY
Qty: 90 TABLET | Refills: 1 | Status: SHIPPED | OUTPATIENT
Start: 2022-05-11 | End: 2023-04-17 | Stop reason: SDUPTHER

## 2022-05-11 RX ORDER — AMLODIPINE BESYLATE 5 MG/1
5 TABLET ORAL DAILY
Qty: 90 TABLET | Refills: 1 | Status: SHIPPED | OUTPATIENT
Start: 2022-05-11 | End: 2023-03-29 | Stop reason: SDUPTHER

## 2022-05-11 RX ORDER — SILDENAFIL 25 MG/1
25 TABLET, FILM COATED ORAL DAILY PRN
Qty: 30 TABLET | Refills: 2 | Status: SHIPPED | OUTPATIENT
Start: 2022-05-11 | End: 2023-05-11

## 2022-05-11 RX ORDER — LOSARTAN POTASSIUM 25 MG/1
25 TABLET ORAL DAILY
Qty: 90 TABLET | Refills: 1 | Status: SHIPPED | OUTPATIENT
Start: 2022-05-11 | End: 2024-01-22 | Stop reason: SDUPTHER

## 2022-05-11 RX ORDER — AZELASTINE 1 MG/ML
1 SPRAY, METERED NASAL 2 TIMES DAILY
Qty: 30 ML | Refills: 2 | Status: SHIPPED | OUTPATIENT
Start: 2022-05-11 | End: 2022-08-16 | Stop reason: SDUPTHER

## 2022-05-11 RX ORDER — ATORVASTATIN CALCIUM 10 MG/1
10 TABLET, FILM COATED ORAL DAILY
Qty: 90 TABLET | Refills: 1 | Status: SHIPPED | OUTPATIENT
Start: 2022-05-11 | End: 2024-01-22 | Stop reason: SDUPTHER

## 2022-05-11 NOTE — PROGRESS NOTES
"  SUBJECTIVE:    Patient ID: Pierre Masters Jr. is a 51 y.o. male.    Chief Complaint: Follow-up (No bottles // Pt need referral eye Dr. // A1c, Colonoscopy ordered // abc)    Pt is a 51 y.o. male who presents today for a 6-month follow-up regarding HTN and DM.  He is a poor historian and unable to recall most of his medications. ALos, he is unable to recall most of his specialist providers he follows up with.  BP is elevated in office today.  Patient states he did not take his blood pressure medications this morning, prior to coming to office.  He does not routinely check his BP at home, to no log was provided today. He denies any frequent headaches, dizziness, CP, or palpitations.  Regarding his DM, he is currently managed on Metformin 1,000mg b.i.d., but does not take it as prescribed - only taking it "maybe" 2x/week.  A1c today is well controlled at 5.8%.  Regarding diet, he avoids carbs (rice, pasta, ect.).  Averages 2 meals/day.  He does not participate in any regimented exercise at this time.    Today, his main complaint is ED symptoms.  He is able to obtain, but not maintain an erection and is interested in starting pharmacological measures to help manage these symptoms.  He denies any penile pain or discharge.    Dr. Salazar (PM&R) - manages history of chronic back pain and right shoulder pain.  He is currently managed on Mobic 15 mg q.d. and Zanaflex 4 mg p.r.n..  He is actively participating in physical therapy 2x/week.     Dr. Velásquez (Rheumatology) - recently followed up with him on 04/12/2022. Was noted to NOT have any rheumatological disease and was instructed to follow-up with GI regarding autoimmune liver disease, but failed to follow up with GI.    Dr. Grace (urology) - followed up with him yesterday regarding his bilateral kidney stones that were noted on lumbar x-ray.  He denies any LUTs today and no new medications were initiated.  Currently being managed with watchful waiting.    Never had " screening colonoscopy performed in the past.    Office Visit on 05/11/2022   Component Date Value Ref Range Status    Hemoglobin A1C 05/11/2022 5.8  % Final   Admission on 12/28/2021, Discharged on 12/28/2021   Component Date Value Ref Range Status    SARS-CoV-2 RNA, Amplification, Qual 12/28/2021 Positive (A) Negative Final    Influenza A, Molecular 12/28/2021 Negative  Negative Final    Influenza B, Molecular 12/28/2021 Negative  Negative Final    Flu A & B Source 12/28/2021 Nasal swab   Final   Office Visit on 11/29/2021   Component Date Value Ref Range Status    Hemoglobin A1C 11/29/2021 5.8  % Final       Past Medical History:   Diagnosis Date    Allergy     Asthma     Diabetes mellitus     Diabetes mellitus, type 2     GERD (gastroesophageal reflux disease)     Gout     Gout     Hyperlipidemia     Hypertension     Lupus     Sleep apnea     Sleep apnea      Past Surgical History:   Procedure Laterality Date    BACK SURGERY      LIPOMA RESECTION      ROTATOR CUFF REPAIR Left      Family History   Problem Relation Age of Onset    Heart disease Mother     Diabetes Father     Hypertension Father     Cancer Father     Atrial fibrillation Brother     Heart disease Brother        Marital Status:   Alcohol History:  reports no history of alcohol use.  Tobacco History:  reports that he has been smoking cigarettes. He has been smoking about 1.00 pack per day. He has never used smokeless tobacco.  Drug History:  reports no history of drug use.    Health Maintenance Topics with due status: Not Due       Topic Last Completion Date    Low Dose Statin 05/11/2022    Hemoglobin A1c 05/11/2022    Influenza Vaccine Not Due     Immunization History   Administered Date(s) Administered    COVID-19, MRNA, LN-S, PF (Pfizer) (Purple Cap) 07/27/2021, 12/28/2021       Review of patient's allergies indicates:  No Known Allergies    Current Outpatient Medications:     albuterol (PROVENTIL) 5 mg/mL  nebulizer solution, Take 2.5 mg by nebulization every 6 (six) hours as needed for Wheezing. Rescue, Disp: , Rfl:     albuterol (PROVENTIL/VENTOLIN HFA) 90 mcg/actuation inhaler, 1-2 puff  Every 6 hrs prn wheeze or sob, Disp: 18 g, Rfl: 2    allopurinoL (ZYLOPRIM) 300 MG tablet, Take 1 tablet (300 mg total) by mouth once daily., Disp: 90 tablet, Rfl: 1    chlorthalidone (HYGROTEN) 25 MG Tab, Take 1 tablet (25 mg total) by mouth once daily., Disp: 30 tablet, Rfl: 11    clotrimazole-betamethasone 1-0.05% (LOTRISONE) cream, Apply topically 2 (two) times daily., Disp: 45 g, Rfl: 1    colchicine (COLCRYS) 0.6 mg tablet, TAKE 2 TABLETS AT ONSET OF symptoms AND TAKE 1 TABLET 1 HOUR LATER, Disp: 30 tablet, Rfl: 2    fluticasone propionate (FLONASE) 50 mcg/actuation nasal spray, SPRAY TWICE INTO EACH NOSTRIL ONCE A DAY AS NEEDED, Disp: 16 g, Rfl: 2    HYDROcodone-acetaminophen (NORCO) 5-325 mg per tablet, Take 1 tablet by mouth every 12 (twelve) hours as needed for Pain., Disp: 14 tablet, Rfl: 0    meloxicam (MOBIC) 15 MG tablet, Take 1 tablet (15 mg total) by mouth once daily., Disp: 90 tablet, Rfl: 1    metFORMIN (GLUCOPHAGE) 1000 MG tablet, Take 1 tablet (1,000 mg total) by mouth 2 (two) times daily with meals., Disp: 180 tablet, Rfl: 1    nystatin (MYCOSTATIN) powder, Apply topically 2 (two) times daily., Disp: 60 g, Rfl: 1    omeprazole (PRILOSEC) 40 MG capsule, Take 1 capsule (40 mg total) by mouth once daily., Disp: 90 capsule, Rfl: 1    pulse oximeter (PULSE OXIMETER) device, by Apply Externally route 2 (two) times a day. Use twice daily at 8 AM and 3 PM and [facility] as [facility]., Disp: 1 each, Rfl: 0    triamcinolone acetonide 0.1% (KENALOG) 0.1 % ointment, Apply to affected areas twice a day, Disp: 80 g, Rfl: 1    amLODIPine (NORVASC) 5 MG tablet, Take 1 tablet (5 mg total) by mouth once daily., Disp: 90 tablet, Rfl: 1    aspirin 81 MG Chew, Take 81 mg by mouth., Disp: , Rfl:      "atorvastatin (LIPITOR) 10 MG tablet, Take 1 tablet (10 mg total) by mouth once daily., Disp: 90 tablet, Rfl: 1    azelastine (ASTELIN) 137 mcg (0.1 %) nasal spray, 1 spray (137 mcg total) by Nasal route 2 (two) times daily., Disp: 30 mL, Rfl: 2    cetirizine (ZYRTEC) 10 MG tablet, Take 1 tablet (10 mg total) by mouth once daily., Disp: 90 tablet, Rfl: 1    losartan (COZAAR) 25 MG tablet, Take 1 tablet (25 mg total) by mouth once daily., Disp: 90 tablet, Rfl: 1    sildenafiL (VIAGRA) 25 MG tablet, Take 1 tablet (25 mg total) by mouth daily as needed for Erectile Dysfunction., Disp: 30 tablet, Rfl: 2    Review of Systems   Constitutional: Negative for activity change, appetite change, chills, fatigue and fever.   HENT: Negative for congestion, postnasal drip and sore throat.    Respiratory: Negative for cough, shortness of breath and wheezing.    Cardiovascular: Negative for chest pain, palpitations and leg swelling.   Gastrointestinal: Negative for abdominal pain, constipation, diarrhea, nausea and vomiting.   Genitourinary: Negative for difficulty urinating, dysuria, flank pain, frequency, hematuria, penile discharge, penile pain, penile swelling and urgency.        "ED problems."   Musculoskeletal: Positive for arthralgias ("Right shoulder.") and back pain. Negative for myalgias.   Skin: Negative for rash.   Neurological: Negative for dizziness, syncope, light-headedness and headaches.          Objective:      Vitals:    05/11/22 0749 05/11/22 0757   BP: (!) 150/100 (!) 160/96   Pulse: 80    Temp: 98.1 °F (36.7 °C)    Weight: 127.9 kg (282 lb)    Height: 5' 7" (1.702 m)      Physical Exam  Vitals and nursing note reviewed.   Constitutional:       General: He is not in acute distress.     Appearance: Normal appearance. He is obese. He is not ill-appearing, toxic-appearing or diaphoretic.      Comments: Morbidly obese AAM, sitting erect in office chair in no acute distress.   HENT:      Head: Normocephalic and " atraumatic.      Right Ear: External ear normal.      Left Ear: External ear normal.      Nose: Nose normal. No rhinorrhea.      Mouth/Throat:      Mouth: Mucous membranes are moist.      Pharynx: Oropharynx is clear.   Eyes:      General: No scleral icterus.        Right eye: No discharge.         Left eye: No discharge.      Extraocular Movements: Extraocular movements intact.      Conjunctiva/sclera: Conjunctivae normal.      Pupils: Pupils are equal, round, and reactive to light.   Cardiovascular:      Rate and Rhythm: Normal rate and regular rhythm.      Pulses: Normal pulses.      Heart sounds: Normal heart sounds. No murmur heard.    No friction rub.   Pulmonary:      Effort: Pulmonary effort is normal. No respiratory distress.      Breath sounds: Normal breath sounds. No wheezing, rhonchi or rales.   Abdominal:      General: There is no distension.      Palpations: Abdomen is soft.      Tenderness: There is no abdominal tenderness. There is no guarding or rebound.   Musculoskeletal:         General: Normal range of motion.      Cervical back: Normal range of motion and neck supple. No rigidity.      Right lower leg: No edema.      Left lower leg: No edema.   Skin:     General: Skin is warm and dry.      Coloration: Skin is not jaundiced or pale.   Neurological:      Mental Status: He is alert. Mental status is at baseline.      Gait: Gait normal.   Psychiatric:         Mood and Affect: Mood normal.         Behavior: Behavior normal. Behavior is cooperative.           Assessment:       1. Type 2 diabetes mellitus with other specified complication, unspecified whether long term insulin use    2. Benign essential HTN    3. Hyperlipidemia, unspecified hyperlipidemia type    4. Class 3 severe obesity due to excess calories with serious comorbidity and body mass index (BMI) of 40.0 to 44.9 in adult    5. Prostate cancer screening    6. Erectile dysfunction, unspecified erectile dysfunction type    7. Seasonal  allergies    8. Positive GILDARDO (antinuclear antibody)    9. Special screening for malignant neoplasms, colon           Plan:       Type 2 diabetes mellitus with other specified complication, unspecified whether long term insulin use  Comments:  A1c Today: 5.8%.  Begin taking Metformin 1000mg b.i.d as prescribed.  Begin following a diabetic diet and increase daily exercise.  Orders:  -     Hemoglobin A1C, POCT  -     Microalbumin/Creatinine Ratio, Urine; Future  -     Urinalysis, Reflex to Urine Culture Urine, Clean Catch; Future; Expected date: 05/11/2022    Benign essential HTN  Comments:  BP elevated in office today, but patient did not take BP medications yet today.  Take BP medications ASAP - begin logging BP b.i.d. x 2 weeks and f/u for a nurse visit/BP check with home BP cuff and log in 2 weeks.  If BP remains elevated, consider starting Chlorthalidone.   Orders:  -     CBC Auto Differential; Future; Expected date: 05/11/2022  -     TSH w/reflex to FT4; Future; Expected date: 05/11/2022  -     losartan (COZAAR) 25 MG tablet; Take 1 tablet (25 mg total) by mouth once daily.  Dispense: 90 tablet; Refill: 1  -     amLODIPine (NORVASC) 5 MG tablet; Take 1 tablet (5 mg total) by mouth once daily.  Dispense: 90 tablet; Refill: 1    Hyperlipidemia, unspecified hyperlipidemia type  Comments:  Cholesterol levels currently well controlled, but with history of diabetes, will initiate statin therapy.  Start Lipitor 10mg q.d. and repeat CMP and lipid panel prior to f/u visit in 3 months.   Orders:  -     Comprehensive Metabolic Panel; Future; Expected date: 05/11/2022  -     Lipid Panel; Future; Expected date: 05/11/2022  -     atorvastatin (LIPITOR) 10 MG tablet; Take 1 tablet (10 mg total) by mouth once daily.  Dispense: 90 tablet; Refill: 1    Class 3 severe obesity due to excess calories with serious comorbidity and body mass index (BMI) of 40.0 to 44.9 in adult    Prostate cancer screening  Comments:  Never had PSA  drawn.    Will obtain PSA level on lab work today.  If elevated, refer to Dr. Grace (Urology).  Orders:  -     PSA, Screening; Future; Expected date: 05/11/2022    Erectile dysfunction, unspecified erectile dysfunction type  Comments:  Start p.r.n Viagra 25mg, 1/2 a tablet for ED.    Orders:  -     sildenafiL (VIAGRA) 25 MG tablet; Take 1 tablet (25 mg total) by mouth daily as needed for Erectile Dysfunction.  Dispense: 30 tablet; Refill: 2    Seasonal allergies  -     cetirizine (ZYRTEC) 10 MG tablet; Take 1 tablet (10 mg total) by mouth once daily.  Dispense: 90 tablet; Refill: 1  -     azelastine (ASTELIN) 137 mcg (0.1 %) nasal spray; 1 spray (137 mcg total) by Nasal route 2 (two) times daily.  Dispense: 30 mL; Refill: 2    Positive GILDARDO (antinuclear antibody)  Comments:  Amb ref sent to Dr. Howard (GI) today for further workup of possible autoimmune hepatitis and for screening colonoscopy.  Orders:  -     Ambulatory referral/consult to Gastroenterology; Future; Expected date: 05/11/2022    Special screening for malignant neoplasms, colon  Comments:  Amb ref sent to Dr. Howard (GI) today for further workup of possible autoimmune hepatitis and for screening colonoscopy.  Orders:  -     Ambulatory referral/consult to Gastroenterology; Future; Expected date: 05/11/2022      Follow up in about 2 weeks (around 5/25/2022) for BP Check-Up w/Nurse, 3 month follow up with Idalia.        5/11/2022 Calvin Sen PA-C

## 2022-05-12 LAB
ALBUMIN SERPL-MCNC: 4 G/DL (ref 3.6–5.1)
ALBUMIN/CREAT UR: 93 MCG/MG CREAT
ALBUMIN/GLOB SERPL: 1.6 (CALC) (ref 1–2.5)
ALP SERPL-CCNC: 88 U/L (ref 35–144)
ALT SERPL-CCNC: 20 U/L (ref 9–46)
APPEARANCE UR: CLEAR
AST SERPL-CCNC: 22 U/L (ref 10–35)
BACTERIA #/AREA URNS HPF: NORMAL /HPF
BACTERIA UR CULT: NORMAL
BASOPHILS # BLD AUTO: 93 CELLS/UL (ref 0–200)
BASOPHILS NFR BLD AUTO: 1.5 %
BILIRUB SERPL-MCNC: 0.8 MG/DL (ref 0.2–1.2)
BILIRUB UR QL STRIP: NEGATIVE
BUN SERPL-MCNC: 15 MG/DL (ref 7–25)
BUN/CREAT SERPL: ABNORMAL (CALC) (ref 6–22)
CALCIUM SERPL-MCNC: 9 MG/DL (ref 8.6–10.3)
CHLORIDE SERPL-SCNC: 107 MMOL/L (ref 98–110)
CHOLEST SERPL-MCNC: 151 MG/DL
CHOLEST/HDLC SERPL: 5.4 (CALC)
CO2 SERPL-SCNC: 25 MMOL/L (ref 20–32)
COLOR UR: YELLOW
CREAT SERPL-MCNC: 0.96 MG/DL (ref 0.7–1.33)
CREAT UR-MCNC: 75 MG/DL (ref 20–320)
EOSINOPHIL # BLD AUTO: 285 CELLS/UL (ref 15–500)
EOSINOPHIL NFR BLD AUTO: 4.6 %
ERYTHROCYTE [DISTWIDTH] IN BLOOD BY AUTOMATED COUNT: 12.4 % (ref 11–15)
GLOBULIN SER CALC-MCNC: 2.5 G/DL (CALC) (ref 1.9–3.7)
GLUCOSE SERPL-MCNC: 117 MG/DL (ref 65–99)
GLUCOSE UR QL STRIP: NEGATIVE
HCT VFR BLD AUTO: 42.2 % (ref 38.5–50)
HDLC SERPL-MCNC: 28 MG/DL
HGB BLD-MCNC: 13.9 G/DL (ref 13.2–17.1)
HGB UR QL STRIP: NEGATIVE
HYALINE CASTS #/AREA URNS LPF: NORMAL /LPF
KETONES UR QL STRIP: NEGATIVE
LDLC SERPL CALC-MCNC: 95 MG/DL (CALC)
LEUKOCYTE ESTERASE UR QL STRIP: NEGATIVE
LYMPHOCYTES # BLD AUTO: 2914 CELLS/UL (ref 850–3900)
LYMPHOCYTES NFR BLD AUTO: 47 %
MCH RBC QN AUTO: 29.8 PG (ref 27–33)
MCHC RBC AUTO-ENTMCNC: 32.9 G/DL (ref 32–36)
MCV RBC AUTO: 90.6 FL (ref 80–100)
MICROALBUMIN UR-MCNC: 7 MG/DL
MONOCYTES # BLD AUTO: 484 CELLS/UL (ref 200–950)
MONOCYTES NFR BLD AUTO: 7.8 %
NEUTROPHILS # BLD AUTO: 2424 CELLS/UL (ref 1500–7800)
NEUTROPHILS NFR BLD AUTO: 39.1 %
NITRITE UR QL STRIP: NEGATIVE
NONHDLC SERPL-MCNC: 123 MG/DL (CALC)
PH UR STRIP: 7 [PH] (ref 5–8)
PLATELET # BLD AUTO: 207 THOUSAND/UL (ref 140–400)
PMV BLD REES-ECKER: 11.1 FL (ref 7.5–12.5)
POTASSIUM SERPL-SCNC: 4.2 MMOL/L (ref 3.5–5.3)
PROT SERPL-MCNC: 6.5 G/DL (ref 6.1–8.1)
PROT UR QL STRIP: NEGATIVE
PSA SERPL-MCNC: 0.24 NG/ML
RBC # BLD AUTO: 4.66 MILLION/UL (ref 4.2–5.8)
RBC #/AREA URNS HPF: NORMAL /HPF
SODIUM SERPL-SCNC: 142 MMOL/L (ref 135–146)
SP GR UR STRIP: 1.01 (ref 1–1.03)
SQUAMOUS #/AREA URNS HPF: NORMAL /HPF
TRIGL SERPL-MCNC: 189 MG/DL
TSH SERPL-ACNC: 1.49 MIU/L (ref 0.4–4.5)
WBC # BLD AUTO: 6.2 THOUSAND/UL (ref 3.8–10.8)
WBC #/AREA URNS HPF: NORMAL /HPF

## 2022-05-13 ENCOUNTER — TELEPHONE (OUTPATIENT)
Dept: FAMILY MEDICINE | Facility: CLINIC | Age: 52
End: 2022-05-13

## 2022-05-13 NOTE — TELEPHONE ENCOUNTER
----- Message from BRENDEN Murcia sent at 5/13/2022  7:02 AM CDT -----  Please contact patient and inform him that his lab work was reviewed.  Lab work looks excellent.  No signs of anemia noted.  Liver, kidney, and thyroid function are within normal limits.  UA is negative for any signs of infection, spill protein, glucose, or blood.  PSA is excellent at 0.24.

## 2022-08-11 ENCOUNTER — TELEPHONE (OUTPATIENT)
Dept: FAMILY MEDICINE | Facility: CLINIC | Age: 52
End: 2022-08-11

## 2022-08-11 DIAGNOSIS — Z79.899 ENCOUNTER FOR LONG-TERM (CURRENT) USE OF OTHER MEDICATIONS: ICD-10-CM

## 2022-08-11 DIAGNOSIS — E78.5 HYPERLIPIDEMIA, UNSPECIFIED HYPERLIPIDEMIA TYPE: Primary | ICD-10-CM

## 2022-08-11 NOTE — TELEPHONE ENCOUNTER
Spoke with pt in regards to a reminder. Verbalized to pt that we were calling to remind him of a 3 month repeat labs. Verbalized that the pt can come into our office for the labs anytime, no appointment needed. Just make sure that you are fasting. Pt acknowledge understanding.

## 2022-08-13 LAB
ALBUMIN SERPL-MCNC: 3.9 G/DL (ref 3.6–5.1)
ALBUMIN/GLOB SERPL: 1.5 (CALC) (ref 1–2.5)
ALP SERPL-CCNC: 101 U/L (ref 35–144)
ALT SERPL-CCNC: 18 U/L (ref 9–46)
AST SERPL-CCNC: 18 U/L (ref 10–35)
BILIRUB SERPL-MCNC: 0.8 MG/DL (ref 0.2–1.2)
BUN SERPL-MCNC: 9 MG/DL (ref 7–25)
BUN/CREAT SERPL: ABNORMAL (CALC) (ref 6–22)
CALCIUM SERPL-MCNC: 9.3 MG/DL (ref 8.6–10.3)
CHLORIDE SERPL-SCNC: 107 MMOL/L (ref 98–110)
CHOLEST SERPL-MCNC: 120 MG/DL
CHOLEST/HDLC SERPL: 4.4 (CALC)
CO2 SERPL-SCNC: 28 MMOL/L (ref 20–32)
CREAT SERPL-MCNC: 1.04 MG/DL (ref 0.7–1.3)
EGFR: 87 ML/MIN/1.73M2
GLOBULIN SER CALC-MCNC: 2.6 G/DL (CALC) (ref 1.9–3.7)
GLUCOSE SERPL-MCNC: 101 MG/DL (ref 65–99)
HDLC SERPL-MCNC: 27 MG/DL
LDLC SERPL CALC-MCNC: 69 MG/DL (CALC)
NONHDLC SERPL-MCNC: 93 MG/DL (CALC)
POTASSIUM SERPL-SCNC: 4.1 MMOL/L (ref 3.5–5.3)
PROT SERPL-MCNC: 6.5 G/DL (ref 6.1–8.1)
SODIUM SERPL-SCNC: 141 MMOL/L (ref 135–146)
TRIGL SERPL-MCNC: 158 MG/DL

## 2022-08-16 ENCOUNTER — OFFICE VISIT (OUTPATIENT)
Dept: FAMILY MEDICINE | Facility: CLINIC | Age: 52
End: 2022-08-16
Payer: MEDICAID

## 2022-08-16 VITALS
BODY MASS INDEX: 42.85 KG/M2 | WEIGHT: 273 LBS | DIASTOLIC BLOOD PRESSURE: 110 MMHG | HEART RATE: 84 BPM | HEIGHT: 67 IN | SYSTOLIC BLOOD PRESSURE: 160 MMHG

## 2022-08-16 DIAGNOSIS — E11.69 TYPE 2 DIABETES MELLITUS WITH OTHER SPECIFIED COMPLICATION, UNSPECIFIED WHETHER LONG TERM INSULIN USE: ICD-10-CM

## 2022-08-16 DIAGNOSIS — K21.9 GASTROESOPHAGEAL REFLUX DISEASE, UNSPECIFIED WHETHER ESOPHAGITIS PRESENT: ICD-10-CM

## 2022-08-16 DIAGNOSIS — Z79.899 ENCOUNTER FOR LONG-TERM (CURRENT) USE OF OTHER MEDICATIONS: ICD-10-CM

## 2022-08-16 DIAGNOSIS — R60.9 EDEMA, UNSPECIFIED TYPE: ICD-10-CM

## 2022-08-16 DIAGNOSIS — Z12.11 SPECIAL SCREENING FOR MALIGNANT NEOPLASMS, COLON: Primary | ICD-10-CM

## 2022-08-16 DIAGNOSIS — M25.50 ARTHRALGIA, UNSPECIFIED JOINT: ICD-10-CM

## 2022-08-16 DIAGNOSIS — J30.2 SEASONAL ALLERGIES: ICD-10-CM

## 2022-08-16 DIAGNOSIS — E78.5 HYPERLIPIDEMIA, UNSPECIFIED HYPERLIPIDEMIA TYPE: ICD-10-CM

## 2022-08-16 DIAGNOSIS — I10 BENIGN ESSENTIAL HTN: ICD-10-CM

## 2022-08-16 PROCEDURE — 3044F HG A1C LEVEL LT 7.0%: CPT | Mod: CPTII,S$GLB,, | Performed by: NURSE PRACTITIONER

## 2022-08-16 PROCEDURE — 3077F PR MOST RECENT SYSTOLIC BLOOD PRESSURE >= 140 MM HG: ICD-10-PCS | Mod: CPTII,S$GLB,, | Performed by: NURSE PRACTITIONER

## 2022-08-16 PROCEDURE — 99214 OFFICE O/P EST MOD 30 MIN: CPT | Mod: S$GLB,,, | Performed by: NURSE PRACTITIONER

## 2022-08-16 PROCEDURE — 3077F SYST BP >= 140 MM HG: CPT | Mod: CPTII,S$GLB,, | Performed by: NURSE PRACTITIONER

## 2022-08-16 PROCEDURE — 1160F RVW MEDS BY RX/DR IN RCRD: CPT | Mod: CPTII,S$GLB,, | Performed by: NURSE PRACTITIONER

## 2022-08-16 PROCEDURE — 3080F PR MOST RECENT DIASTOLIC BLOOD PRESSURE >= 90 MM HG: ICD-10-PCS | Mod: CPTII,S$GLB,, | Performed by: NURSE PRACTITIONER

## 2022-08-16 PROCEDURE — 1159F MED LIST DOCD IN RCRD: CPT | Mod: CPTII,S$GLB,, | Performed by: NURSE PRACTITIONER

## 2022-08-16 PROCEDURE — 3008F PR BODY MASS INDEX (BMI) DOCUMENTED: ICD-10-PCS | Mod: CPTII,S$GLB,, | Performed by: NURSE PRACTITIONER

## 2022-08-16 PROCEDURE — 3066F NEPHROPATHY DOC TX: CPT | Mod: CPTII,S$GLB,, | Performed by: NURSE PRACTITIONER

## 2022-08-16 PROCEDURE — 1159F PR MEDICATION LIST DOCUMENTED IN MEDICAL RECORD: ICD-10-PCS | Mod: CPTII,S$GLB,, | Performed by: NURSE PRACTITIONER

## 2022-08-16 PROCEDURE — 1160F PR REVIEW ALL MEDS BY PRESCRIBER/CLIN PHARMACIST DOCUMENTED: ICD-10-PCS | Mod: CPTII,S$GLB,, | Performed by: NURSE PRACTITIONER

## 2022-08-16 PROCEDURE — 3044F PR MOST RECENT HEMOGLOBIN A1C LEVEL <7.0%: ICD-10-PCS | Mod: CPTII,S$GLB,, | Performed by: NURSE PRACTITIONER

## 2022-08-16 PROCEDURE — 3066F PR DOCUMENTATION OF TREATMENT FOR NEPHROPATHY: ICD-10-PCS | Mod: CPTII,S$GLB,, | Performed by: NURSE PRACTITIONER

## 2022-08-16 PROCEDURE — 3008F BODY MASS INDEX DOCD: CPT | Mod: CPTII,S$GLB,, | Performed by: NURSE PRACTITIONER

## 2022-08-16 PROCEDURE — 4010F PR ACE/ARB THEARPY RXD/TAKEN: ICD-10-PCS | Mod: CPTII,S$GLB,, | Performed by: NURSE PRACTITIONER

## 2022-08-16 PROCEDURE — 99214 PR OFFICE/OUTPT VISIT, EST, LEVL IV, 30-39 MIN: ICD-10-PCS | Mod: S$GLB,,, | Performed by: NURSE PRACTITIONER

## 2022-08-16 PROCEDURE — 3080F DIAST BP >= 90 MM HG: CPT | Mod: CPTII,S$GLB,, | Performed by: NURSE PRACTITIONER

## 2022-08-16 PROCEDURE — 4010F ACE/ARB THERAPY RXD/TAKEN: CPT | Mod: CPTII,S$GLB,, | Performed by: NURSE PRACTITIONER

## 2022-08-16 PROCEDURE — 3060F POS MICROALBUMINURIA REV: CPT | Mod: CPTII,S$GLB,, | Performed by: NURSE PRACTITIONER

## 2022-08-16 PROCEDURE — 3060F PR POS MICROALBUMINURIA RESULT DOCUMENTED/REVIEW: ICD-10-PCS | Mod: CPTII,S$GLB,, | Performed by: NURSE PRACTITIONER

## 2022-08-16 RX ORDER — OMEPRAZOLE 40 MG/1
40 CAPSULE, DELAYED RELEASE ORAL DAILY
Qty: 90 CAPSULE | Refills: 1 | Status: SHIPPED | OUTPATIENT
Start: 2022-08-16 | End: 2023-04-17 | Stop reason: SDUPTHER

## 2022-08-16 RX ORDER — AZITHROMYCIN 250 MG/1
TABLET, FILM COATED ORAL
Qty: 6 TABLET | Refills: 0 | Status: SHIPPED | OUTPATIENT
Start: 2022-08-16 | End: 2022-08-21

## 2022-08-16 RX ORDER — CHLORTHALIDONE 25 MG/1
25 TABLET ORAL DAILY
Qty: 30 TABLET | Refills: 11 | Status: SHIPPED | OUTPATIENT
Start: 2022-08-16 | End: 2023-03-29 | Stop reason: SDUPTHER

## 2022-08-16 RX ORDER — AZELASTINE 1 MG/ML
1 SPRAY, METERED NASAL 2 TIMES DAILY
Qty: 30 ML | Refills: 2 | Status: SHIPPED | OUTPATIENT
Start: 2022-08-16 | End: 2024-01-22 | Stop reason: SDUPTHER

## 2022-08-16 RX ORDER — METFORMIN HYDROCHLORIDE 1000 MG/1
1000 TABLET ORAL 2 TIMES DAILY WITH MEALS
Qty: 180 TABLET | Refills: 1 | Status: SHIPPED | OUTPATIENT
Start: 2022-08-16 | End: 2023-04-17 | Stop reason: SDUPTHER

## 2022-08-16 RX ORDER — COLCHICINE 0.6 MG/1
TABLET ORAL
Qty: 30 TABLET | Refills: 2 | Status: SHIPPED | OUTPATIENT
Start: 2022-08-16 | End: 2024-01-22 | Stop reason: SDUPTHER

## 2022-08-23 NOTE — PROGRESS NOTES
SUBJECTIVE:    Patient ID: Pierre Masters Jr. is a 51 y.o. male.    Chief Complaint: Hypertension (Did not bring bottles//needs refills//bs), Hyperlipidemia, and Diabetes (Needs A1c & DM eye exam//needs foot exam//bs)    51 year old male presents for check up. Patient is treated for htn, gout, hyperlipidemia, allergies, gerd,, oa and dm. bp in office is elevated. Not checking at home. No chest pain. No sob. Has not taken medication today. Does have some post nasal drip. Sinus congestion. No fever. No ear pain. Occasional dry cough.  Saw Dr. Velásquez and autoimmune  Lab work repeated. 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 He was told that he may have an autoimmune hepatitis. A hga1c is well controlled in office today. Still has daily back pain. Seeing ortho dr. Anderson.       Telephone on 08/11/2022   Component Date Value Ref Range Status    Cholesterol 08/12/2022 120  <200 mg/dL Final    HDL 08/12/2022 27 (A) > OR = 40 mg/dL Final    Triglycerides 08/12/2022 158 (A) <150 mg/dL Final    LDL Cholesterol 08/12/2022 69  mg/dL (calc) Final    HDL/Cholesterol Ratio 08/12/2022 4.4  <5.0 (calc) Final    Non HDL Chol. (LDL+VLDL) 08/12/2022 93  <130 mg/dL (calc) Final    Glucose 08/12/2022 101 (A) 65 - 99 mg/dL Final    BUN 08/12/2022 9  7 - 25 mg/dL Final    Creatinine 08/12/2022 1.04  0.70 - 1.30 mg/dL Final    EGFR 08/12/2022 87  > OR = 60 mL/min/1.73m2 Final    BUN/Creatinine Ratio 08/12/2022 NOT APPLICABLE  6 - 22 (calc) Final    Sodium 08/12/2022 141  135 - 146 mmol/L Final    Potassium 08/12/2022 4.1  3.5 - 5.3 mmol/L Final    Chloride 08/12/2022 107  98 - 110 mmol/L Final    CO2 08/12/2022 28  20 - 32 mmol/L Final    Calcium 08/12/2022 9.3  8.6 - 10.3 mg/dL Final    Total Protein 08/12/2022 6.5  6.1 - 8.1 g/dL Final    Albumin 08/12/2022 3.9  3.6 - 5.1 g/dL Final    Globulin, Total 08/12/2022 2.6  1.9 - 3.7 g/dL (calc) Final    Albumin/Globulin Ratio 08/12/2022 1.5  1.0 - 2.5 (calc) Final     Total Bilirubin 08/12/2022 0.8  0.2 - 1.2 mg/dL Final    Alkaline Phosphatase 08/12/2022 101  35 - 144 U/L Final    AST 08/12/2022 18  10 - 35 U/L Final    ALT 08/12/2022 18  9 - 46 U/L Final   Office Visit on 05/11/2022   Component Date Value Ref Range Status    Hemoglobin A1C 05/11/2022 5.8  % Final    WBC 05/11/2022 6.2  3.8 - 10.8 Thousand/uL Final    RBC 05/11/2022 4.66  4.20 - 5.80 Million/uL Final    Hemoglobin 05/11/2022 13.9  13.2 - 17.1 g/dL Final    Hematocrit 05/11/2022 42.2  38.5 - 50.0 % Final    MCV 05/11/2022 90.6  80.0 - 100.0 fL Final    MCH 05/11/2022 29.8  27.0 - 33.0 pg Final    MCHC 05/11/2022 32.9  32.0 - 36.0 g/dL Final    RDW 05/11/2022 12.4  11.0 - 15.0 % Final    Platelets 05/11/2022 207  140 - 400 Thousand/uL Final    MPV 05/11/2022 11.1  7.5 - 12.5 fL Final    Neutrophils, Abs 05/11/2022 2,424  1,500 - 7,800 cells/uL Final    Lymph # 05/11/2022 2,914  850 - 3,900 cells/uL Final    Mono # 05/11/2022 484  200 - 950 cells/uL Final    Eos # 05/11/2022 285  15 - 500 cells/uL Final    Baso # 05/11/2022 93  0 - 200 cells/uL Final    Neutrophils Relative 05/11/2022 39.1  % Final    Lymph % 05/11/2022 47.0  % Final    Mono % 05/11/2022 7.8  % Final    Eosinophil % 05/11/2022 4.6  % Final    Basophil % 05/11/2022 1.5  % Final    TSH w/reflex to FT4 05/11/2022 1.49  0.40 - 4.50 mIU/L Final    Creatinine, Urine 05/11/2022 75  20 - 320 mg/dL Final    Microalb, Ur 05/11/2022 7.0  See Note: mg/dL Final    Microalb/Creat Ratio 05/11/2022 93 (A) <30 mcg/mg creat Final    PROSTATE SPECIFIC ANTIGEN, SCR - Q* 05/11/2022 0.24  < OR = 4.00 ng/mL Final    Glucose 05/11/2022 117 (A) 65 - 99 mg/dL Final    BUN 05/11/2022 15  7 - 25 mg/dL Final    Creatinine 05/11/2022 0.96  0.70 - 1.33 mg/dL Final    eGFR if non African American 05/11/2022 92  > OR = 60 mL/min/1.73m2 Final    eGFR if  05/11/2022 106  > OR = 60 mL/min/1.73m2 Final    BUN/Creatinine Ratio  05/11/2022 NOT APPLICABLE  6 - 22 (calc) Final    Sodium 05/11/2022 142  135 - 146 mmol/L Final    Potassium 05/11/2022 4.2  3.5 - 5.3 mmol/L Final    Chloride 05/11/2022 107  98 - 110 mmol/L Final    CO2 05/11/2022 25  20 - 32 mmol/L Final    Calcium 05/11/2022 9.0  8.6 - 10.3 mg/dL Final    Total Protein 05/11/2022 6.5  6.1 - 8.1 g/dL Final    Albumin 05/11/2022 4.0  3.6 - 5.1 g/dL Final    Globulin, Total 05/11/2022 2.5  1.9 - 3.7 g/dL (calc) Final    Albumin/Globulin Ratio 05/11/2022 1.6  1.0 - 2.5 (calc) Final    Total Bilirubin 05/11/2022 0.8  0.2 - 1.2 mg/dL Final    Alkaline Phosphatase 05/11/2022 88  35 - 144 U/L Final    AST 05/11/2022 22  10 - 35 U/L Final    ALT 05/11/2022 20  9 - 46 U/L Final    Cholesterol 05/11/2022 151  <200 mg/dL Final    HDL 05/11/2022 28 (A) > OR = 40 mg/dL Final    Triglycerides 05/11/2022 189 (A) <150 mg/dL Final    LDL Cholesterol 05/11/2022 95  mg/dL (calc) Final    HDL/Cholesterol Ratio 05/11/2022 5.4 (A) <5.0 (calc) Final    Non HDL Chol. (LDL+VLDL) 05/11/2022 123  <130 mg/dL (calc) Final    Color, UA 05/11/2022 YELLOW  YELLOW Final    Appearance, UA 05/11/2022 CLEAR  CLEAR Final    Specific Gravity, UA 05/11/2022 1.015  1.001 - 1.035 Final    pH, UA 05/11/2022 7.0  5.0 - 8.0 Final    Glucose, UA 05/11/2022 NEGATIVE  NEGATIVE Final    Bilirubin, UA 05/11/2022 NEGATIVE  NEGATIVE Final    Ketones, UA 05/11/2022 NEGATIVE  NEGATIVE Final    Occult Blood UA 05/11/2022 NEGATIVE  NEGATIVE Final    Protein, UA 05/11/2022 NEGATIVE  NEGATIVE Final    Nitrite, UA 05/11/2022 NEGATIVE  NEGATIVE Final    Leukocytes, UA 05/11/2022 NEGATIVE  NEGATIVE Final    WBC Casts, UA 05/11/2022 NONE SEEN  < OR = 5 /HPF Final    RBC Casts, UA 05/11/2022 NONE SEEN  < OR = 2 /HPF Final    Squam Epithel, UA 05/11/2022 NONE SEEN  < OR = 5 /HPF Final    Bacteria, UA 05/11/2022 NONE SEEN  NONE SEEN /HPF Final    Hyaline Casts, UA 05/11/2022 NONE SEEN  NONE SEEN /LPF Final     Reflexive Urine Culture 05/11/2022    Final       Past Medical History:   Diagnosis Date    Allergy     Asthma     Diabetes mellitus     Diabetes mellitus, type 2     GERD (gastroesophageal reflux disease)     Gout     Gout     Hyperlipidemia     Hypertension     Lupus     Sleep apnea     Sleep apnea      Social History     Socioeconomic History    Marital status:    Tobacco Use    Smoking status: Heavy Tobacco Smoker     Packs/day: 1.00     Types: Cigarettes    Smokeless tobacco: Never Used   Substance and Sexual Activity    Alcohol use: No    Drug use: No   Social History Narrative    Always in pain does not sleep well    No exercise     Past Surgical History:   Procedure Laterality Date    BACK SURGERY      LIPOMA RESECTION      ROTATOR CUFF REPAIR Left      Family History   Problem Relation Age of Onset    Heart disease Mother     Diabetes Father     Hypertension Father     Cancer Father     Atrial fibrillation Brother     Heart disease Brother        Review of patient's allergies indicates:  No Known Allergies    Current Outpatient Medications:     albuterol (PROVENTIL) 5 mg/mL nebulizer solution, Take 2.5 mg by nebulization every 6 (six) hours as needed for Wheezing. Rescue, Disp: , Rfl:     albuterol (PROVENTIL/VENTOLIN HFA) 90 mcg/actuation inhaler, 1-2 puff  Every 6 hrs prn wheeze or sob (Patient taking differently: 1-2 puff  Every 6 hrs prn wheeze or sob), Disp: 18 g, Rfl: 2    allopurinoL (ZYLOPRIM) 300 MG tablet, Take 1 tablet (300 mg total) by mouth once daily., Disp: 90 tablet, Rfl: 1    amLODIPine (NORVASC) 5 MG tablet, Take 1 tablet (5 mg total) by mouth once daily., Disp: 90 tablet, Rfl: 1    aspirin 81 MG Chew, Take 81 mg by mouth., Disp: , Rfl:     atorvastatin (LIPITOR) 10 MG tablet, Take 1 tablet (10 mg total) by mouth once daily., Disp: 90 tablet, Rfl: 1    cetirizine (ZYRTEC) 10 MG tablet, Take 1 tablet (10 mg total) by mouth once daily., Disp: 90  tablet, Rfl: 1    clotrimazole-betamethasone 1-0.05% (LOTRISONE) cream, Apply topically 2 (two) times daily., Disp: 45 g, Rfl: 1    fluticasone propionate (FLONASE) 50 mcg/actuation nasal spray, SPRAY TWICE INTO EACH NOSTRIL ONCE A DAY AS NEEDED, Disp: 16 g, Rfl: 2    losartan (COZAAR) 25 MG tablet, Take 1 tablet (25 mg total) by mouth once daily., Disp: 90 tablet, Rfl: 1    meloxicam (MOBIC) 15 MG tablet, Take 1 tablet (15 mg total) by mouth once daily., Disp: 90 tablet, Rfl: 1    pulse oximeter (PULSE OXIMETER) device, by Apply Externally route 2 (two) times a day. Use twice daily at 8 AM and 3 PM and record the value in MyChart as directed., Disp: 1 each, Rfl: 0    triamcinolone acetonide 0.1% (KENALOG) 0.1 % ointment, Apply to affected areas twice a day, Disp: 80 g, Rfl: 1    azelastine (ASTELIN) 137 mcg (0.1 %) nasal spray, 1 spray (137 mcg total) by Nasal route 2 (two) times daily., Disp: 30 mL, Rfl: 2    chlorthalidone (HYGROTEN) 25 MG Tab, Take 1 tablet (25 mg total) by mouth once daily., Disp: 30 tablet, Rfl: 11    colchicine (COLCRYS) 0.6 mg tablet, TAKE 2 TABLETS AT ONSET OF symptoms AND TAKE 1 TABLET 1 HOUR LATER, Disp: 30 tablet, Rfl: 2    HYDROcodone-acetaminophen (NORCO) 5-325 mg per tablet, Take 1 tablet by mouth every 12 (twelve) hours as needed for Pain. (Patient not taking: Reported on 8/16/2022), Disp: 14 tablet, Rfl: 0    metFORMIN (GLUCOPHAGE) 1000 MG tablet, Take 1 tablet (1,000 mg total) by mouth 2 (two) times daily with meals., Disp: 180 tablet, Rfl: 1    nystatin (MYCOSTATIN) powder, Apply topically 2 (two) times daily. (Patient not taking: Reported on 8/16/2022), Disp: 60 g, Rfl: 1    omeprazole (PRILOSEC) 40 MG capsule, Take 1 capsule (40 mg total) by mouth once daily., Disp: 90 capsule, Rfl: 1    sildenafiL (VIAGRA) 25 MG tablet, Take 1 tablet (25 mg total) by mouth daily as needed for Erectile Dysfunction. (Patient not taking: Reported on 8/16/2022), Disp: 30 tablet, Rfl:  "2    Review of Systems   Constitutional: Negative for fatigue, fever and unexpected weight change.   HENT: Negative for ear pain, sinus pressure and sore throat.    Eyes: Negative for pain.   Respiratory: Negative for cough and shortness of breath.    Cardiovascular: Negative for chest pain and leg swelling.   Gastrointestinal: Negative for abdominal pain, constipation, nausea and vomiting.   Genitourinary: Negative for dysuria, frequency and urgency.   Musculoskeletal: Positive for back pain. Negative for arthralgias.   Skin: Negative for rash.   Neurological: Negative for dizziness, weakness and headaches.   Psychiatric/Behavioral: Negative for sleep disturbance.          Objective:      Vitals:    08/16/22 1025 08/16/22 1039   BP: (!) 170/108 (!) 160/110   Pulse: 84    Weight: 123.8 kg (273 lb)    Height: 5' 7" (1.702 m)      Physical Exam  Constitutional:       General: He is not in acute distress.     Appearance: Normal appearance. He is well-developed. He is obese.   HENT:      Head: Normocephalic and atraumatic.      Right Ear: External ear normal.      Left Ear: External ear normal.   Eyes:      Pupils: Pupils are equal, round, and reactive to light.   Neck:      Trachea: No tracheal deviation.   Cardiovascular:      Rate and Rhythm: Normal rate and regular rhythm.      Heart sounds: No murmur heard.    No friction rub. No gallop.   Pulmonary:      Breath sounds: Normal breath sounds. No stridor. No wheezing or rales.   Abdominal:      Palpations: Abdomen is soft. There is no mass.      Tenderness: There is no abdominal tenderness.   Musculoskeletal:         General: No tenderness or deformity.      Cervical back: Neck supple.      Lumbar back: Decreased range of motion.   Feet:      Right foot:      Protective Sensation: 5 sites tested. 5 sites sensed.      Left foot:      Protective Sensation: 5 sites tested. 5 sites sensed.   Lymphadenopathy:      Cervical: No cervical adenopathy.   Skin:     General: " Skin is warm and dry.   Neurological:      Mental Status: He is alert and oriented to person, place, and time.      Coordination: Coordination normal.   Psychiatric:         Thought Content: Thought content normal.           Assessment:       1. Special screening for malignant neoplasms, colon    2. Type 2 diabetes mellitus with other specified complication, unspecified whether long term insulin use    3. Seasonal allergies    4. Benign essential HTN    5. Edema, unspecified type    6. Gastroesophageal reflux disease, unspecified whether esophagitis present    7. Hyperlipidemia, unspecified hyperlipidemia type    8. Arthralgia, unspecified joint    9. Encounter for long-term (current) use of other medications         Plan:       Special screening for malignant neoplasms, colon  -     Ambulatory referral/consult to Gastroenterology; Future; Expected date: 08/23/2022    Type 2 diabetes mellitus with other specified complication, unspecified whether long term insulin use  -     Ambulatory referral/consult to Ophthalmology; Future; Expected date: 08/23/2022  -     Foot Exam Performed    Seasonal allergies  -     azelastine (ASTELIN) 137 mcg (0.1 %) nasal spray; 1 spray (137 mcg total) by Nasal route 2 (two) times daily.  Dispense: 30 mL; Refill: 2    Benign essential HTN  Comments:  monitor bp . readings to office next week  Orders:  -     chlorthalidone (HYGROTEN) 25 MG Tab; Take 1 tablet (25 mg total) by mouth once daily.  Dispense: 30 tablet; Refill: 11    Edema, unspecified type  Comments:  decrease norvasc to 5mg daily. add fluid pill. call if no improvement.   Orders:  -     chlorthalidone (HYGROTEN) 25 MG Tab; Take 1 tablet (25 mg total) by mouth once daily.  Dispense: 30 tablet; Refill: 11    Gastroesophageal reflux disease, unspecified whether esophagitis present  -     omeprazole (PRILOSEC) 40 MG capsule; Take 1 capsule (40 mg total) by mouth once daily.  Dispense: 90 capsule; Refill: 1    Hyperlipidemia,  unspecified hyperlipidemia type    Arthralgia, unspecified joint    Encounter for long-term (current) use of other medications    Other orders  -     colchicine (COLCRYS) 0.6 mg tablet; TAKE 2 TABLETS AT ONSET OF symptoms AND TAKE 1 TABLET 1 HOUR LATER  Dispense: 30 tablet; Refill: 2  -     metFORMIN (GLUCOPHAGE) 1000 MG tablet; Take 1 tablet (1,000 mg total) by mouth 2 (two) times daily with meals.  Dispense: 180 tablet; Refill: 1  -     azithromycin (Z-SHAWNA) 250 MG tablet; Take 2 tablets by mouth on day 1; Take 1 tablet by mouth on days 2-5  Dispense: 6 tablet; Refill: 0      Follow up in about 3 months (around 11/16/2022) for medication management.        8/23/2022 Idalia Hercules

## 2022-08-24 ENCOUNTER — TELEPHONE (OUTPATIENT)
Dept: FAMILY MEDICINE | Facility: CLINIC | Age: 52
End: 2022-08-24

## 2022-08-24 NOTE — TELEPHONE ENCOUNTER
----- Message from Idalia Hercules NP sent at 8/23/2022 12:14 AM CDT -----  Labs are stable. Continue as is.

## 2022-09-06 ENCOUNTER — OFFICE VISIT (OUTPATIENT)
Dept: URGENT CARE | Facility: CLINIC | Age: 52
End: 2022-09-06
Payer: MEDICAID

## 2022-09-06 VITALS
BODY MASS INDEX: 42.63 KG/M2 | OXYGEN SATURATION: 96 % | WEIGHT: 271.63 LBS | HEIGHT: 67 IN | HEART RATE: 85 BPM | DIASTOLIC BLOOD PRESSURE: 84 MMHG | SYSTOLIC BLOOD PRESSURE: 131 MMHG | TEMPERATURE: 98 F | RESPIRATION RATE: 18 BRPM

## 2022-09-06 DIAGNOSIS — K04.7 DENTAL ABSCESS: ICD-10-CM

## 2022-09-06 DIAGNOSIS — Z86.39 HISTORY OF TYPE 2 DIABETES MELLITUS: Primary | ICD-10-CM

## 2022-09-06 DIAGNOSIS — Z86.79 HISTORY OF HYPERTENSION: ICD-10-CM

## 2022-09-06 DIAGNOSIS — L03.211 FACIAL CELLULITIS: ICD-10-CM

## 2022-09-06 PROBLEM — M10.9 GOUT: Status: ACTIVE | Noted: 2021-09-27

## 2022-09-06 PROBLEM — M47.812 CERVICAL SPONDYLOSIS WITHOUT MYELOPATHY: Status: ACTIVE | Noted: 2022-09-06

## 2022-09-06 PROBLEM — B00.52 HERPES SIMPLEX VIRUS (HSV) EPITHELIAL KERATITIS: Status: ACTIVE | Noted: 2020-01-13

## 2022-09-06 PROCEDURE — 1160F PR REVIEW ALL MEDS BY PRESCRIBER/CLIN PHARMACIST DOCUMENTED: ICD-10-PCS | Mod: CPTII,S$GLB,, | Performed by: NURSE PRACTITIONER

## 2022-09-06 PROCEDURE — 3079F DIAST BP 80-89 MM HG: CPT | Mod: CPTII,S$GLB,, | Performed by: NURSE PRACTITIONER

## 2022-09-06 PROCEDURE — 3075F SYST BP GE 130 - 139MM HG: CPT | Mod: CPTII,S$GLB,, | Performed by: NURSE PRACTITIONER

## 2022-09-06 PROCEDURE — 4010F PR ACE/ARB THEARPY RXD/TAKEN: ICD-10-PCS | Mod: CPTII,S$GLB,, | Performed by: NURSE PRACTITIONER

## 2022-09-06 PROCEDURE — 1160F RVW MEDS BY RX/DR IN RCRD: CPT | Mod: CPTII,S$GLB,, | Performed by: NURSE PRACTITIONER

## 2022-09-06 PROCEDURE — 1159F PR MEDICATION LIST DOCUMENTED IN MEDICAL RECORD: ICD-10-PCS | Mod: CPTII,S$GLB,, | Performed by: NURSE PRACTITIONER

## 2022-09-06 PROCEDURE — 3060F POS MICROALBUMINURIA REV: CPT | Mod: CPTII,S$GLB,, | Performed by: NURSE PRACTITIONER

## 2022-09-06 PROCEDURE — 3066F NEPHROPATHY DOC TX: CPT | Mod: CPTII,S$GLB,, | Performed by: NURSE PRACTITIONER

## 2022-09-06 PROCEDURE — 4010F ACE/ARB THERAPY RXD/TAKEN: CPT | Mod: CPTII,S$GLB,, | Performed by: NURSE PRACTITIONER

## 2022-09-06 PROCEDURE — 99204 OFFICE O/P NEW MOD 45 MIN: CPT | Mod: S$GLB,,, | Performed by: NURSE PRACTITIONER

## 2022-09-06 PROCEDURE — 3066F PR DOCUMENTATION OF TREATMENT FOR NEPHROPATHY: ICD-10-PCS | Mod: CPTII,S$GLB,, | Performed by: NURSE PRACTITIONER

## 2022-09-06 PROCEDURE — 1159F MED LIST DOCD IN RCRD: CPT | Mod: CPTII,S$GLB,, | Performed by: NURSE PRACTITIONER

## 2022-09-06 PROCEDURE — 3008F BODY MASS INDEX DOCD: CPT | Mod: CPTII,S$GLB,, | Performed by: NURSE PRACTITIONER

## 2022-09-06 PROCEDURE — 3044F HG A1C LEVEL LT 7.0%: CPT | Mod: CPTII,S$GLB,, | Performed by: NURSE PRACTITIONER

## 2022-09-06 PROCEDURE — 3008F PR BODY MASS INDEX (BMI) DOCUMENTED: ICD-10-PCS | Mod: CPTII,S$GLB,, | Performed by: NURSE PRACTITIONER

## 2022-09-06 PROCEDURE — 3075F PR MOST RECENT SYSTOLIC BLOOD PRESS GE 130-139MM HG: ICD-10-PCS | Mod: CPTII,S$GLB,, | Performed by: NURSE PRACTITIONER

## 2022-09-06 PROCEDURE — 3079F PR MOST RECENT DIASTOLIC BLOOD PRESSURE 80-89 MM HG: ICD-10-PCS | Mod: CPTII,S$GLB,, | Performed by: NURSE PRACTITIONER

## 2022-09-06 PROCEDURE — 99204 PR OFFICE/OUTPT VISIT, NEW, LEVL IV, 45-59 MIN: ICD-10-PCS | Mod: S$GLB,,, | Performed by: NURSE PRACTITIONER

## 2022-09-06 PROCEDURE — 3044F PR MOST RECENT HEMOGLOBIN A1C LEVEL <7.0%: ICD-10-PCS | Mod: CPTII,S$GLB,, | Performed by: NURSE PRACTITIONER

## 2022-09-06 PROCEDURE — 3060F PR POS MICROALBUMINURIA RESULT DOCUMENTED/REVIEW: ICD-10-PCS | Mod: CPTII,S$GLB,, | Performed by: NURSE PRACTITIONER

## 2022-09-06 RX ORDER — CEFTRIAXONE 500 MG/1
500 INJECTION, POWDER, FOR SOLUTION INTRAMUSCULAR; INTRAVENOUS
Status: COMPLETED | OUTPATIENT
Start: 2022-09-06 | End: 2022-09-06

## 2022-09-06 RX ORDER — AMOXICILLIN AND CLAVULANATE POTASSIUM 875; 125 MG/1; MG/1
1 TABLET, FILM COATED ORAL EVERY 12 HOURS
Qty: 20 TABLET | Refills: 0 | Status: SHIPPED | OUTPATIENT
Start: 2022-09-06 | End: 2022-09-16

## 2022-09-06 RX ORDER — HYDROCODONE BITARTRATE AND ACETAMINOPHEN 5; 325 MG/1; MG/1
1 TABLET ORAL EVERY 6 HOURS PRN
Qty: 12 TABLET | Refills: 0 | OUTPATIENT
Start: 2022-09-06 | End: 2022-09-11

## 2022-09-06 RX ADMIN — CEFTRIAXONE 500 MG: 500 INJECTION, POWDER, FOR SOLUTION INTRAMUSCULAR; INTRAVENOUS at 05:09

## 2022-09-06 NOTE — PROGRESS NOTES
"Subjective:       Patient ID: Pierre Masters Jr. is a 51 y.o. male.    Vitals:  height is 5' 7" (1.702 m) and weight is 123.2 kg (271 lb 9.6 oz). His temperature is 98.1 °F (36.7 °C). His blood pressure is 131/84 and his pulse is 85. His respiration is 18 and oxygen saturation is 96%.     Chief Complaint: Dental Pain    Pt states that he has a toothache and pain in the left ear x's 4 days    Dental Pain   The current episode started in the past 7 days (x's 4 days). The problem has been waxing and waning. The pain is at a severity of 10/10. The pain is mild. Associated symptoms include facial pain. Pertinent negatives include no fever.     Constitution: Negative for chills and fever.   HENT:  Positive for ear pain (Left external ear), dental problem and facial swelling. Negative for ear discharge, mouth sores, facial trauma, congestion and sore throat.    Neck: Negative for neck stiffness and neck swelling.   Respiratory:  Negative for cough, stridor and wheezing.    Gastrointestinal:  Negative for abdominal bloating, nausea, vomiting and diarrhea.   Neurological:  Positive for headaches (Left-sided).     Objective:      Physical Exam   Constitutional: He is oriented to person, place, and time. He appears well-developed.  Non-toxic appearance. He appears ill. No distress. obesity  HENT:   Head: Normocephalic and atraumatic. Head is without left periorbital erythema.       Ears:   Right Ear: Tympanic membrane, external ear and ear canal normal.   Left Ear: Tympanic membrane, external ear and ear canal normal.      Comments: Pain and swelling soft tissues surrounding the left ear pinna  Nose: Nose normal. No congestion.   Mouth/Throat: Uvula is midline and oropharynx is clear and moist. Mucous membranes are moist. No oral lesions. No trismus in the jaw. Dental abscesses and dental caries present. No uvula swelling. No oropharyngeal exudate or posterior oropharyngeal erythema.       Red marking denote the area of " swelling and tenderness to palpation on exam      Comments: Red marking denote the area of swelling and tenderness to palpation on exam  Eyes: Conjunctivae and EOM are normal. Right eye exhibits no discharge. Left eye exhibits no discharge.   Neck: Neck supple.   Cardiovascular: Normal rate, regular rhythm and normal heart sounds.   Pulmonary/Chest: Effort normal. No respiratory distress.   Abdominal: Normal appearance.   Musculoskeletal: Normal range of motion.         General: Normal range of motion.   Neurological: no focal deficit. He is alert and oriented to person, place, and time.   Skin: Skin is warm, dry and not diaphoretic. Capillary refill takes 2 to 3 seconds.   Psychiatric: His behavior is normal. Mood normal.   Nursing note and vitals reviewed.      Assessment:       1. History of type 2 diabetes mellitus    2. History of hypertension    3. Dental abscess    4. Facial cellulitis          Plan:         History of type 2 diabetes mellitus    History of hypertension    Dental abscess  -     cefTRIAXone injection 500 mg  -     amoxicillin-clavulanate 875-125mg (AUGMENTIN) 875-125 mg per tablet; Take 1 tablet by mouth every 12 (twelve) hours. for 10 days  Dispense: 20 tablet; Refill: 0    Facial cellulitis  -     cefTRIAXone injection 500 mg  -     amoxicillin-clavulanate 875-125mg (AUGMENTIN) 875-125 mg per tablet; Take 1 tablet by mouth every 12 (twelve) hours. for 10 days  Dispense: 20 tablet; Refill: 0       Ibuprofen or Aleve over-the-counter as directed for pain, swelling, inflammation.    Norco as directed for breakthrough pain.    alternate ice and heat to the left jaw area.  Apply for 15-20 minutes every 2-4 hours into his symptoms are improving.    Return to clinic or go to the emergency room for evaluation if symptoms fail to improve to antibiotics after 48 hours or symptoms worsen especially with onset of fever/chills/vomiting/headache.    Call tomorrow and schedule an appointment with a dentist  for close follow-up.

## 2022-09-06 NOTE — PATIENT INSTRUCTIONS
Ibuprofen or Aleve over-the-counter as directed for pain, swelling, inflammation.    Norco as directed for breakthrough pain.    alternate ice and heat to the left jaw area.  Apply for 15-20 minutes every 2-4 hours into his symptoms are improving.    Return to clinic or go to the emergency room for evaluation if symptoms fail to improve to antibiotics after 48 hours or symptoms worsen especially with onset of fever/chills/vomiting/headache.    Call tomorrow and schedule an appointment with a dentist for close follow-up.

## 2022-09-11 ENCOUNTER — HOSPITAL ENCOUNTER (EMERGENCY)
Facility: HOSPITAL | Age: 52
Discharge: HOME | End: 2022-09-11
Attending: EMERGENCY MEDICINE
Payer: MEDICAID

## 2022-09-11 VITALS
HEIGHT: 67 IN | SYSTOLIC BLOOD PRESSURE: 139 MMHG | WEIGHT: 270 LBS | OXYGEN SATURATION: 97 % | BODY MASS INDEX: 42.38 KG/M2 | RESPIRATION RATE: 18 BRPM | DIASTOLIC BLOOD PRESSURE: 79 MMHG | TEMPERATURE: 98 F | HEART RATE: 87 BPM

## 2022-09-11 DIAGNOSIS — L03.90 CELLULITIS, UNSPECIFIED CELLULITIS SITE: ICD-10-CM

## 2022-09-11 DIAGNOSIS — H92.02 OTALGIA OF LEFT EAR: Primary | ICD-10-CM

## 2022-09-11 LAB
ALBUMIN SERPL BCP-MCNC: 3.4 G/DL (ref 3.5–5.2)
ALP SERPL-CCNC: 88 U/L (ref 55–135)
ALT SERPL W/O P-5'-P-CCNC: 21 U/L (ref 10–44)
ANION GAP SERPL CALC-SCNC: 9 MMOL/L (ref 8–16)
AST SERPL-CCNC: 24 U/L (ref 10–40)
BASOPHILS # BLD AUTO: 0.08 K/UL (ref 0–0.2)
BASOPHILS NFR BLD: 1.2 % (ref 0–1.9)
BILIRUB SERPL-MCNC: 0.8 MG/DL (ref 0.1–1)
BUN SERPL-MCNC: 10 MG/DL (ref 6–20)
CALCIUM SERPL-MCNC: 8.8 MG/DL (ref 8.7–10.5)
CHLORIDE SERPL-SCNC: 102 MMOL/L (ref 95–110)
CO2 SERPL-SCNC: 28 MMOL/L (ref 23–29)
CREAT SERPL-MCNC: 1 MG/DL (ref 0.5–1.4)
CRP SERPL-MCNC: 0.68 MG/DL
DIFFERENTIAL METHOD: ABNORMAL
EOSINOPHIL # BLD AUTO: 0.3 K/UL (ref 0–0.5)
EOSINOPHIL NFR BLD: 4.3 % (ref 0–8)
ERYTHROCYTE [DISTWIDTH] IN BLOOD BY AUTOMATED COUNT: 12.7 % (ref 11.5–14.5)
ERYTHROCYTE [SEDIMENTATION RATE] IN BLOOD BY WESTERGREN METHOD: 19 MM/HR (ref 0–10)
EST. GFR  (NO RACE VARIABLE): >60 ML/MIN/1.73 M^2
GLUCOSE SERPL-MCNC: 225 MG/DL (ref 70–110)
HCT VFR BLD AUTO: 38.4 % (ref 40–54)
HGB BLD-MCNC: 12.4 G/DL (ref 14–18)
IMM GRANULOCYTES # BLD AUTO: 0.01 K/UL (ref 0–0.04)
IMM GRANULOCYTES NFR BLD AUTO: 0.1 % (ref 0–0.5)
LYMPHOCYTES # BLD AUTO: 2.4 K/UL (ref 1–4.8)
LYMPHOCYTES NFR BLD: 35.2 % (ref 18–48)
MCH RBC QN AUTO: 29.6 PG (ref 27–31)
MCHC RBC AUTO-ENTMCNC: 32.3 G/DL (ref 32–36)
MCV RBC AUTO: 92 FL (ref 82–98)
MONOCYTES # BLD AUTO: 0.5 K/UL (ref 0.3–1)
MONOCYTES NFR BLD: 6.7 % (ref 4–15)
NEUTROPHILS # BLD AUTO: 3.6 K/UL (ref 1.8–7.7)
NEUTROPHILS NFR BLD: 52.5 % (ref 38–73)
NRBC BLD-RTO: 0 /100 WBC
PLATELET # BLD AUTO: 231 K/UL (ref 150–450)
PMV BLD AUTO: 10.9 FL (ref 9.2–12.9)
POTASSIUM SERPL-SCNC: 3.3 MMOL/L (ref 3.5–5.1)
PROT SERPL-MCNC: 6.4 G/DL (ref 6–8.4)
RBC # BLD AUTO: 4.19 M/UL (ref 4.6–6.2)
SODIUM SERPL-SCNC: 139 MMOL/L (ref 136–145)
WBC # BLD AUTO: 6.82 K/UL (ref 3.9–12.7)

## 2022-09-11 PROCEDURE — 25000003 PHARM REV CODE 250: Performed by: EMERGENCY MEDICINE

## 2022-09-11 PROCEDURE — 85651 RBC SED RATE NONAUTOMATED: CPT | Performed by: EMERGENCY MEDICINE

## 2022-09-11 PROCEDURE — 85025 COMPLETE CBC W/AUTO DIFF WBC: CPT | Performed by: EMERGENCY MEDICINE

## 2022-09-11 PROCEDURE — 86140 C-REACTIVE PROTEIN: CPT | Performed by: EMERGENCY MEDICINE

## 2022-09-11 PROCEDURE — 99284 EMERGENCY DEPT VISIT MOD MDM: CPT | Mod: 25

## 2022-09-11 PROCEDURE — 80053 COMPREHEN METABOLIC PANEL: CPT | Performed by: EMERGENCY MEDICINE

## 2022-09-11 RX ORDER — POTASSIUM CHLORIDE 20 MEQ/1
20 TABLET, EXTENDED RELEASE ORAL ONCE
Status: COMPLETED | OUTPATIENT
Start: 2022-09-11 | End: 2022-09-11

## 2022-09-11 RX ORDER — DOXYCYCLINE 100 MG/1
100 CAPSULE ORAL 2 TIMES DAILY
Qty: 20 CAPSULE | Refills: 0 | Status: SHIPPED | OUTPATIENT
Start: 2022-09-11 | End: 2022-09-21

## 2022-09-11 RX ORDER — HYDROCODONE BITARTRATE AND ACETAMINOPHEN 5; 325 MG/1; MG/1
1 TABLET ORAL EVERY 4 HOURS PRN
Qty: 10 TABLET | Refills: 0 | Status: SHIPPED | OUTPATIENT
Start: 2022-09-11

## 2022-09-11 RX ADMIN — POTASSIUM CHLORIDE 20 MEQ: 1500 TABLET, EXTENDED RELEASE ORAL at 01:09
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
## 2022-09-11 NOTE — DISCHARGE INSTRUCTIONS
Warm compresses to the area   Tylenol if needed for pain  Follow-up with ENT as directed   Return if condition becomes worse for any concerns   Take antibiotics as directed until all gone

## 2022-10-19 ENCOUNTER — APPOINTMENT (OUTPATIENT)
Dept: RADIOLOGY | Facility: HOSPITAL | Age: 52
End: 2022-10-19
Payer: MEDICAID

## 2022-10-19 ENCOUNTER — HOSPITAL ENCOUNTER (EMERGENCY)
Facility: HOSPITAL | Age: 52
Discharge: HOME | End: 2022-10-19
Attending: EMERGENCY MEDICINE
Payer: MEDICAID

## 2022-10-19 VITALS
OXYGEN SATURATION: 96 % | BODY MASS INDEX: 42.38 KG/M2 | TEMPERATURE: 99 F | DIASTOLIC BLOOD PRESSURE: 95 MMHG | WEIGHT: 270 LBS | HEART RATE: 89 BPM | RESPIRATION RATE: 18 BRPM | SYSTOLIC BLOOD PRESSURE: 152 MMHG | HEIGHT: 67 IN

## 2022-10-19 DIAGNOSIS — S20.219A CONTUSION OF CHEST WALL, UNSPECIFIED LATERALITY, INITIAL ENCOUNTER: ICD-10-CM

## 2022-10-19 DIAGNOSIS — S70.11XA CONTUSION OF RIGHT THIGH, INITIAL ENCOUNTER: ICD-10-CM
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
## 2022-10-19 DIAGNOSIS — R07.9 CHEST PAIN: ICD-10-CM

## 2022-10-19 PROCEDURE — 71046 XR CHEST PA AND LATERAL: ICD-10-PCS | Mod: 26,,, | Performed by: RADIOLOGY

## 2022-10-19 PROCEDURE — 96372 THER/PROPH/DIAG INJ SC/IM: CPT | Performed by: PHYSICIAN ASSISTANT

## 2022-10-19 PROCEDURE — 71046 X-RAY EXAM CHEST 2 VIEWS: CPT | Mod: TC,FY

## 2022-10-19 PROCEDURE — 87389 HIV-1 AG W/HIV-1&-2 AB AG IA: CPT | Performed by: EMERGENCY MEDICINE

## 2022-10-19 PROCEDURE — 63600175 PHARM REV CODE 636 W HCPCS: Performed by: PHYSICIAN ASSISTANT

## 2022-10-19 PROCEDURE — 93010 ELECTROCARDIOGRAM REPORT: CPT | Mod: ,,, | Performed by: INTERNAL MEDICINE

## 2022-10-19 PROCEDURE — 93010 EKG 12-LEAD: ICD-10-PCS | Mod: ,,, | Performed by: INTERNAL MEDICINE

## 2022-10-19 PROCEDURE — 99285 EMERGENCY DEPT VISIT HI MDM: CPT | Mod: 25

## 2022-10-19 PROCEDURE — 71046 X-RAY EXAM CHEST 2 VIEWS: CPT | Mod: 26,,, | Performed by: RADIOLOGY

## 2022-10-19 PROCEDURE — 36415 COLL VENOUS BLD VENIPUNCTURE: CPT | Performed by: EMERGENCY MEDICINE

## 2022-10-19 PROCEDURE — 93005 ELECTROCARDIOGRAM TRACING: CPT

## 2022-10-19 RX ORDER — KETOROLAC TROMETHAMINE 30 MG/ML
30 INJECTION, SOLUTION INTRAMUSCULAR; INTRAVENOUS
Status: COMPLETED | OUTPATIENT
Start: 2022-10-19 | End: 2022-10-19

## 2022-10-19 RX ORDER — ORPHENADRINE CITRATE 30 MG/ML
60 INJECTION INTRAMUSCULAR; INTRAVENOUS
Status: COMPLETED | OUTPATIENT
Start: 2022-10-19 | End: 2022-10-19

## 2022-10-19 RX ORDER — DICLOFENAC SODIUM 50 MG/1
50 TABLET, DELAYED RELEASE ORAL 2 TIMES DAILY PRN
Qty: 20 TABLET | Refills: 0 | Status: SHIPPED | OUTPATIENT
Start: 2022-10-19

## 2022-10-19 RX ORDER — CYCLOBENZAPRINE HCL 10 MG
10 TABLET ORAL 3 TIMES DAILY PRN
Qty: 12 TABLET | Refills: 0 | Status: SHIPPED | OUTPATIENT
Start: 2022-10-19 | End: 2022-10-23

## 2022-10-19 RX ADMIN — ORPHENADRINE CITRATE 60 MG: 30 INJECTION INTRAMUSCULAR; INTRAVENOUS at 04:10

## 2022-10-19 RX ADMIN — KETOROLAC TROMETHAMINE 30 MG: 30 INJECTION, SOLUTION INTRAMUSCULAR at 04:10
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
## 2022-10-20 LAB — HIV 1+2 AB+HIV1 P24 AG SERPL QL IA: NORMAL

## 2022-12-06 ENCOUNTER — TELEPHONE (OUTPATIENT)
Dept: FAMILY MEDICINE | Facility: CLINIC | Age: 52
End: 2022-12-06

## 2022-12-06 NOTE — TELEPHONE ENCOUNTER
----- Message from Cassi Gonzalez sent at 12/6/2022  2:39 PM CST -----  Patient called and stated that he need a refill of his clotrimazole-betamethasone and his albuterol inhaler called into [facility] on [address] if any questions please give him a call at [phone]

## 2023-01-09 ENCOUNTER — OCCUPATIONAL HEALTH (OUTPATIENT)
Dept: URGENT CARE | Facility: CLINIC | Age: 53
End: 2023-01-09

## 2023-01-09 DIAGNOSIS — Z13.9 ENCOUNTER FOR SCREENING: Primary | ICD-10-CM

## 2023-01-09 LAB — COLLECTION ONLY: NORMAL

## 2023-01-09 PROCEDURE — 80305 DRUG TEST PRSMV DIR OPT OBS: CPT | Mod: S$GLB,,, | Performed by: EMERGENCY MEDICINE

## 2023-01-09 PROCEDURE — 80305 OOH COLLECTION ONLY DRUG SCREEN: ICD-10-PCS | Mod: S$GLB,,, | Performed by: EMERGENCY MEDICINE

## 2023-03-29 ENCOUNTER — OFFICE VISIT (OUTPATIENT)
Dept: FAMILY MEDICINE | Facility: CLINIC | Age: 53
End: 2023-03-29
Payer: MEDICAID

## 2023-03-29 VITALS
SYSTOLIC BLOOD PRESSURE: 162 MMHG | HEART RATE: 83 BPM | BODY MASS INDEX: 43.01 KG/M2 | DIASTOLIC BLOOD PRESSURE: 100 MMHG | OXYGEN SATURATION: 98 % | WEIGHT: 267.63 LBS | HEIGHT: 66 IN

## 2023-03-29 DIAGNOSIS — E11.69 TYPE 2 DIABETES MELLITUS WITH OTHER SPECIFIED COMPLICATION, UNSPECIFIED WHETHER LONG TERM INSULIN USE: ICD-10-CM

## 2023-03-29 DIAGNOSIS — Z79.899 HIGH RISK MEDICATION USE: Primary | ICD-10-CM

## 2023-03-29 DIAGNOSIS — K21.9 GASTROESOPHAGEAL REFLUX DISEASE, UNSPECIFIED WHETHER ESOPHAGITIS PRESENT: ICD-10-CM

## 2023-03-29 DIAGNOSIS — M25.50 ARTHRALGIA, UNSPECIFIED JOINT: ICD-10-CM

## 2023-03-29 DIAGNOSIS — Z12.5 SCREENING FOR PROSTATE CANCER: ICD-10-CM

## 2023-03-29 DIAGNOSIS — I10 BENIGN ESSENTIAL HTN: ICD-10-CM

## 2023-03-29 DIAGNOSIS — R60.9 EDEMA, UNSPECIFIED TYPE: ICD-10-CM

## 2023-03-29 DIAGNOSIS — E66.01 CLASS 3 SEVERE OBESITY DUE TO EXCESS CALORIES WITH SERIOUS COMORBIDITY AND BODY MASS INDEX (BMI) OF 40.0 TO 44.9 IN ADULT: ICD-10-CM

## 2023-03-29 DIAGNOSIS — F17.200 SMOKING: ICD-10-CM

## 2023-03-29 LAB — HBA1C MFR BLD: 5.5 %

## 2023-03-29 PROCEDURE — 99214 PR OFFICE/OUTPT VISIT, EST, LEVL IV, 30-39 MIN: ICD-10-PCS | Mod: S$GLB,,, | Performed by: NURSE PRACTITIONER

## 2023-03-29 PROCEDURE — 1160F RVW MEDS BY RX/DR IN RCRD: CPT | Mod: CPTII,S$GLB,, | Performed by: NURSE PRACTITIONER

## 2023-03-29 PROCEDURE — 1159F PR MEDICATION LIST DOCUMENTED IN MEDICAL RECORD: ICD-10-PCS | Mod: CPTII,S$GLB,, | Performed by: NURSE PRACTITIONER

## 2023-03-29 PROCEDURE — 3077F PR MOST RECENT SYSTOLIC BLOOD PRESSURE >= 140 MM HG: ICD-10-PCS | Mod: CPTII,S$GLB,, | Performed by: NURSE PRACTITIONER

## 2023-03-29 PROCEDURE — 83036 HEMOGLOBIN GLYCOSYLATED A1C: CPT | Mod: QW,,, | Performed by: NURSE PRACTITIONER

## 2023-03-29 PROCEDURE — 3044F PR MOST RECENT HEMOGLOBIN A1C LEVEL <7.0%: ICD-10-PCS | Mod: CPTII,S$GLB,, | Performed by: NURSE PRACTITIONER

## 2023-03-29 PROCEDURE — 3080F PR MOST RECENT DIASTOLIC BLOOD PRESSURE >= 90 MM HG: ICD-10-PCS | Mod: CPTII,S$GLB,, | Performed by: NURSE PRACTITIONER

## 2023-03-29 PROCEDURE — 99214 OFFICE O/P EST MOD 30 MIN: CPT | Mod: S$GLB,,, | Performed by: NURSE PRACTITIONER

## 2023-03-29 PROCEDURE — 1159F MED LIST DOCD IN RCRD: CPT | Mod: CPTII,S$GLB,, | Performed by: NURSE PRACTITIONER

## 2023-03-29 PROCEDURE — 3008F PR BODY MASS INDEX (BMI) DOCUMENTED: ICD-10-PCS | Mod: CPTII,S$GLB,, | Performed by: NURSE PRACTITIONER

## 2023-03-29 PROCEDURE — 3044F HG A1C LEVEL LT 7.0%: CPT | Mod: CPTII,S$GLB,, | Performed by: NURSE PRACTITIONER

## 2023-03-29 PROCEDURE — 3008F BODY MASS INDEX DOCD: CPT | Mod: CPTII,S$GLB,, | Performed by: NURSE PRACTITIONER

## 2023-03-29 PROCEDURE — 3077F SYST BP >= 140 MM HG: CPT | Mod: CPTII,S$GLB,, | Performed by: NURSE PRACTITIONER

## 2023-03-29 PROCEDURE — 83036 POCT HEMOGLOBIN A1C: ICD-10-PCS | Mod: QW,,, | Performed by: NURSE PRACTITIONER

## 2023-03-29 PROCEDURE — 3080F DIAST BP >= 90 MM HG: CPT | Mod: CPTII,S$GLB,, | Performed by: NURSE PRACTITIONER

## 2023-03-29 PROCEDURE — 1160F PR REVIEW ALL MEDS BY PRESCRIBER/CLIN PHARMACIST DOCUMENTED: ICD-10-PCS | Mod: CPTII,S$GLB,, | Performed by: NURSE PRACTITIONER

## 2023-03-29 RX ORDER — CHLORTHALIDONE 25 MG/1
25 TABLET ORAL DAILY
Qty: 30 TABLET | Refills: 11 | Status: SHIPPED | OUTPATIENT
Start: 2023-03-29 | End: 2024-01-22 | Stop reason: SDUPTHER

## 2023-03-29 RX ORDER — AMLODIPINE BESYLATE 5 MG/1
5 TABLET ORAL DAILY
Qty: 90 TABLET | Refills: 1 | Status: SHIPPED | OUTPATIENT
Start: 2023-03-29 | End: 2024-01-22 | Stop reason: SDUPTHER

## 2023-03-30 LAB
ALBUMIN SERPL-MCNC: 4.3 G/DL (ref 3.6–5.1)
ALBUMIN/GLOB SERPL: 1.7 (CALC) (ref 1–2.5)
ALP SERPL-CCNC: 90 U/L (ref 35–144)
ALT SERPL-CCNC: 24 U/L (ref 9–46)
AST SERPL-CCNC: 23 U/L (ref 10–35)
BASOPHILS # BLD AUTO: 93 CELLS/UL (ref 0–200)
BASOPHILS NFR BLD AUTO: 1.6 %
BILIRUB SERPL-MCNC: 1 MG/DL (ref 0.2–1.2)
BUN SERPL-MCNC: 13 MG/DL (ref 7–25)
BUN/CREAT SERPL: ABNORMAL (CALC) (ref 6–22)
CALCIUM SERPL-MCNC: 9.2 MG/DL (ref 8.6–10.3)
CHLORIDE SERPL-SCNC: 106 MMOL/L (ref 98–110)
CHOLEST SERPL-MCNC: 123 MG/DL
CHOLEST/HDLC SERPL: 3.5 (CALC)
CO2 SERPL-SCNC: 29 MMOL/L (ref 20–32)
CREAT SERPL-MCNC: 1.01 MG/DL (ref 0.7–1.3)
EGFR: 90 ML/MIN/1.73M2
EOSINOPHIL # BLD AUTO: 261 CELLS/UL (ref 15–500)
EOSINOPHIL NFR BLD AUTO: 4.5 %
ERYTHROCYTE [DISTWIDTH] IN BLOOD BY AUTOMATED COUNT: 12.3 % (ref 11–15)
GLOBULIN SER CALC-MCNC: 2.5 G/DL (CALC) (ref 1.9–3.7)
GLUCOSE SERPL-MCNC: 103 MG/DL (ref 65–99)
HCT VFR BLD AUTO: 43.5 % (ref 38.5–50)
HDLC SERPL-MCNC: 35 MG/DL
HGB BLD-MCNC: 14.4 G/DL (ref 13.2–17.1)
LDLC SERPL CALC-MCNC: 67 MG/DL (CALC)
LYMPHOCYTES # BLD AUTO: 2842 CELLS/UL (ref 850–3900)
LYMPHOCYTES NFR BLD AUTO: 49 %
MCH RBC QN AUTO: 30.4 PG (ref 27–33)
MCHC RBC AUTO-ENTMCNC: 33.1 G/DL (ref 32–36)
MCV RBC AUTO: 91.8 FL (ref 80–100)
MONOCYTES # BLD AUTO: 464 CELLS/UL (ref 200–950)
MONOCYTES NFR BLD AUTO: 8 %
NEUTROPHILS # BLD AUTO: 2140 CELLS/UL (ref 1500–7800)
NEUTROPHILS NFR BLD AUTO: 36.9 %
NONHDLC SERPL-MCNC: 88 MG/DL (CALC)
PLATELET # BLD AUTO: 238 THOUSAND/UL (ref 140–400)
PMV BLD REES-ECKER: 11.5 FL (ref 7.5–12.5)
POTASSIUM SERPL-SCNC: 4.1 MMOL/L (ref 3.5–5.3)
PROT SERPL-MCNC: 6.8 G/DL (ref 6.1–8.1)
PSA SERPL-MCNC: 0.27 NG/ML
RBC # BLD AUTO: 4.74 MILLION/UL (ref 4.2–5.8)
SODIUM SERPL-SCNC: 143 MMOL/L (ref 135–146)
TRIGL SERPL-MCNC: 128 MG/DL
TSH SERPL-ACNC: 1.6 MIU/L (ref 0.4–4.5)
WBC # BLD AUTO: 5.8 THOUSAND/UL (ref 3.8–10.8)

## 2023-04-03 ENCOUNTER — CLINICAL SUPPORT (OUTPATIENT)
Dept: FAMILY MEDICINE | Facility: CLINIC | Age: 53
End: 2023-04-03
Payer: MEDICAID

## 2023-04-03 VITALS — DIASTOLIC BLOOD PRESSURE: 94 MMHG | SYSTOLIC BLOOD PRESSURE: 152 MMHG

## 2023-04-03 NOTE — PROGRESS NOTES
Patient was here for blood pressure check, increase the Losartan 25mg to 50 mg and amlodipine 5mg the same x Idalia Hercules , come back in 2 weeks, abc

## 2023-04-10 ENCOUNTER — TELEPHONE (OUTPATIENT)
Dept: FAMILY MEDICINE | Facility: CLINIC | Age: 53
End: 2023-04-10

## 2023-04-10 NOTE — TELEPHONE ENCOUNTER
----- Message from Idalia Hercules NP sent at 4/10/2023  7:42 AM CDT -----  Labs look good. Continue as is.

## 2023-04-11 ENCOUNTER — PATIENT MESSAGE (OUTPATIENT)
Dept: ADMINISTRATIVE | Facility: HOSPITAL | Age: 53
End: 2023-04-11
Payer: MEDICAID

## 2023-04-17 ENCOUNTER — CLINICAL SUPPORT (OUTPATIENT)
Dept: FAMILY MEDICINE | Facility: CLINIC | Age: 53
End: 2023-04-17
Payer: MEDICAID

## 2023-04-17 VITALS — SYSTOLIC BLOOD PRESSURE: 132 MMHG | DIASTOLIC BLOOD PRESSURE: 80 MMHG

## 2023-04-17 DIAGNOSIS — M25.511 CHRONIC RIGHT SHOULDER PAIN: ICD-10-CM

## 2023-04-17 DIAGNOSIS — J30.2 SEASONAL ALLERGIES: ICD-10-CM

## 2023-04-17 DIAGNOSIS — K21.9 GASTROESOPHAGEAL REFLUX DISEASE, UNSPECIFIED WHETHER ESOPHAGITIS PRESENT: ICD-10-CM

## 2023-04-17 DIAGNOSIS — M54.12 CERVICAL RADICULOPATHY: ICD-10-CM

## 2023-04-17 DIAGNOSIS — G89.29 CHRONIC RIGHT SHOULDER PAIN: ICD-10-CM

## 2023-04-17 RX ORDER — ALBUTEROL SULFATE 90 UG/1
AEROSOL, METERED RESPIRATORY (INHALATION)
Qty: 18 G | Refills: 2 | Status: CANCELLED | OUTPATIENT
Start: 2023-04-17

## 2023-04-17 RX ORDER — OMEPRAZOLE 40 MG/1
40 CAPSULE, DELAYED RELEASE ORAL DAILY
Qty: 90 CAPSULE | Refills: 1 | Status: CANCELLED | OUTPATIENT
Start: 2023-04-17

## 2023-04-17 RX ORDER — MELOXICAM 15 MG/1
15 TABLET ORAL DAILY
Qty: 90 TABLET | Refills: 1 | Status: SHIPPED | OUTPATIENT
Start: 2023-04-17 | End: 2024-01-22 | Stop reason: SDUPTHER

## 2023-04-17 RX ORDER — OMEPRAZOLE 40 MG/1
40 CAPSULE, DELAYED RELEASE ORAL DAILY
Qty: 90 CAPSULE | Refills: 1 | Status: SHIPPED | OUTPATIENT
Start: 2023-04-17 | End: 2024-01-22 | Stop reason: SDUPTHER

## 2023-04-17 RX ORDER — ALBUTEROL SULFATE 90 UG/1
AEROSOL, METERED RESPIRATORY (INHALATION)
Qty: 18 G | Refills: 2 | Status: SHIPPED | OUTPATIENT
Start: 2023-04-17 | End: 2024-01-22 | Stop reason: SDUPTHER

## 2023-04-17 RX ORDER — CETIRIZINE HYDROCHLORIDE 10 MG/1
10 TABLET ORAL DAILY
Qty: 90 TABLET | Refills: 1 | Status: SHIPPED | OUTPATIENT
Start: 2023-04-17 | End: 2024-01-22 | Stop reason: SDUPTHER

## 2023-04-17 RX ORDER — CETIRIZINE HYDROCHLORIDE 10 MG/1
10 TABLET ORAL DAILY
Qty: 90 TABLET | Refills: 1 | Status: CANCELLED | OUTPATIENT
Start: 2023-04-17

## 2023-04-17 RX ORDER — METFORMIN HYDROCHLORIDE 1000 MG/1
1000 TABLET ORAL 2 TIMES DAILY WITH MEALS
Qty: 180 TABLET | Refills: 1 | Status: CANCELLED | OUTPATIENT
Start: 2023-04-17

## 2023-04-17 RX ORDER — METFORMIN HYDROCHLORIDE 1000 MG/1
1000 TABLET ORAL 2 TIMES DAILY WITH MEALS
Qty: 180 TABLET | Refills: 1 | Status: SHIPPED | OUTPATIENT
Start: 2023-04-17 | End: 2023-06-29

## 2023-04-17 NOTE — PROGRESS NOTES
Patient is here for 2 week blood pressure recheck. Currently taking Allopurinol 300mg once daily, Amlodipine 5mg once daily and Losartan 25mg once daily.

## 2023-04-17 NOTE — TELEPHONE ENCOUNTER
Patient is not following anyone for his back. Per Idalia we will start him back on Meloxicam 15mg and PT referral will be sent out.

## 2023-04-17 NOTE — TELEPHONE ENCOUNTER
During nurse visit today patient complained of having back pain that radiates down to his groin area that also causing pain to his testicles. Pain has been going on for 2 weeks since he picked something up. He has back surgery about 7 years ago. He has been taking goodies for pain relief but it has not been helping. Patient notified a message would be sent to Idalia to see if there is some there he can be squeeze into. Also needed medication refills no bottles brought verbal went over meds that needed refills.   Primary

## 2023-05-09 LAB — CRC RECOMMENDATION EXT: NORMAL

## 2023-06-03 LAB
ALBUMIN/CREAT UR: 30 MCG/MG CREAT
APPEARANCE UR: CLEAR
BACTERIA #/AREA URNS HPF: ABNORMAL /HPF
BACTERIA UR CULT: ABNORMAL
BACTERIA UR CULT: ABNORMAL
BILIRUB UR QL STRIP: NEGATIVE
COLOR UR: YELLOW
CREAT UR-MCNC: 144 MG/DL (ref 20–320)
GLUCOSE UR QL STRIP: NEGATIVE
HGB UR QL STRIP: ABNORMAL
HYALINE CASTS #/AREA URNS LPF: ABNORMAL /LPF
KETONES UR QL STRIP: NEGATIVE
LEUKOCYTE ESTERASE UR QL STRIP: ABNORMAL
MICROALBUMIN UR-MCNC: 4.3 MG/DL
NITRITE UR QL STRIP: NEGATIVE
PH UR STRIP: 5.5 [PH] (ref 5–8)
PROT UR QL STRIP: NEGATIVE
RBC #/AREA URNS HPF: ABNORMAL /HPF
SERVICE CMNT-IMP: ABNORMAL
SP GR UR STRIP: 1.01 (ref 1–1.03)
SQUAMOUS #/AREA URNS HPF: ABNORMAL /HPF
WBC #/AREA URNS HPF: ABNORMAL /HPF

## 2023-06-06 ENCOUNTER — TELEPHONE (OUTPATIENT)
Dept: FAMILY MEDICINE | Facility: CLINIC | Age: 53
End: 2023-06-06

## 2023-06-06 NOTE — TELEPHONE ENCOUNTER
Spoke with patient, 1+ occult blood in urine. Patient reports no symptoms. States he saw a urologist about a year ago and everything was okay. States every now and then he has a slight pain in his testicles but this isn't new or worsening. Reports no pain while urinating, no frequency. He has an appointment on 6/29 with Idalia. I did let him know we would want this repeated before that visit and that I would call him back with any further recommendations from her.

## 2023-06-28 ENCOUNTER — TELEPHONE (OUTPATIENT)
Dept: FAMILY MEDICINE | Facility: CLINIC | Age: 53
End: 2023-06-28

## 2023-06-28 NOTE — TELEPHONE ENCOUNTER
----- Message from Cassi Gonzalez sent at 6/28/2023  8:46 AM CDT -----  CLAYTONI : patient called to verify his appointment date and time, I advised him of the information and confirmed the appointment. No patient call back needed.

## 2023-06-29 ENCOUNTER — OFFICE VISIT (OUTPATIENT)
Dept: FAMILY MEDICINE | Facility: CLINIC | Age: 53
End: 2023-06-29
Payer: MEDICAID

## 2023-06-29 VITALS
WEIGHT: 265 LBS | HEART RATE: 83 BPM | BODY MASS INDEX: 42.59 KG/M2 | OXYGEN SATURATION: 99 % | SYSTOLIC BLOOD PRESSURE: 138 MMHG | HEIGHT: 66 IN | DIASTOLIC BLOOD PRESSURE: 88 MMHG

## 2023-06-29 DIAGNOSIS — E78.5 HYPERLIPIDEMIA, UNSPECIFIED HYPERLIPIDEMIA TYPE: ICD-10-CM

## 2023-06-29 DIAGNOSIS — E11.69 TYPE 2 DIABETES MELLITUS WITH OTHER SPECIFIED COMPLICATION, UNSPECIFIED WHETHER LONG TERM INSULIN USE: Primary | ICD-10-CM

## 2023-06-29 DIAGNOSIS — I10 BENIGN ESSENTIAL HTN: ICD-10-CM

## 2023-06-29 DIAGNOSIS — K21.9 GASTROESOPHAGEAL REFLUX DISEASE, UNSPECIFIED WHETHER ESOPHAGITIS PRESENT: ICD-10-CM

## 2023-06-29 DIAGNOSIS — R31.9 HEMATURIA, UNSPECIFIED TYPE: ICD-10-CM

## 2023-06-29 LAB — HBA1C MFR BLD: 5.4 %

## 2023-06-29 PROCEDURE — 1160F PR REVIEW ALL MEDS BY PRESCRIBER/CLIN PHARMACIST DOCUMENTED: ICD-10-PCS | Mod: CPTII,S$GLB,, | Performed by: NURSE PRACTITIONER

## 2023-06-29 PROCEDURE — 3008F PR BODY MASS INDEX (BMI) DOCUMENTED: ICD-10-PCS | Mod: CPTII,S$GLB,, | Performed by: NURSE PRACTITIONER

## 2023-06-29 PROCEDURE — 1160F RVW MEDS BY RX/DR IN RCRD: CPT | Mod: CPTII,S$GLB,, | Performed by: NURSE PRACTITIONER

## 2023-06-29 PROCEDURE — 3066F NEPHROPATHY DOC TX: CPT | Mod: CPTII,S$GLB,, | Performed by: NURSE PRACTITIONER

## 2023-06-29 PROCEDURE — 3060F POS MICROALBUMINURIA REV: CPT | Mod: CPTII,S$GLB,, | Performed by: NURSE PRACTITIONER

## 2023-06-29 PROCEDURE — 3066F PR DOCUMENTATION OF TREATMENT FOR NEPHROPATHY: ICD-10-PCS | Mod: CPTII,S$GLB,, | Performed by: NURSE PRACTITIONER

## 2023-06-29 PROCEDURE — 99214 OFFICE O/P EST MOD 30 MIN: CPT | Mod: S$GLB,,, | Performed by: NURSE PRACTITIONER

## 2023-06-29 PROCEDURE — 83036 POCT HEMOGLOBIN A1C: ICD-10-PCS | Mod: QW,,, | Performed by: NURSE PRACTITIONER

## 2023-06-29 PROCEDURE — 3079F PR MOST RECENT DIASTOLIC BLOOD PRESSURE 80-89 MM HG: ICD-10-PCS | Mod: CPTII,S$GLB,, | Performed by: NURSE PRACTITIONER

## 2023-06-29 PROCEDURE — 3079F DIAST BP 80-89 MM HG: CPT | Mod: CPTII,S$GLB,, | Performed by: NURSE PRACTITIONER

## 2023-06-29 PROCEDURE — 1159F MED LIST DOCD IN RCRD: CPT | Mod: CPTII,S$GLB,, | Performed by: NURSE PRACTITIONER

## 2023-06-29 PROCEDURE — 1159F PR MEDICATION LIST DOCUMENTED IN MEDICAL RECORD: ICD-10-PCS | Mod: CPTII,S$GLB,, | Performed by: NURSE PRACTITIONER

## 2023-06-29 PROCEDURE — 3075F SYST BP GE 130 - 139MM HG: CPT | Mod: CPTII,S$GLB,, | Performed by: NURSE PRACTITIONER

## 2023-06-29 PROCEDURE — 3060F PR POS MICROALBUMINURIA RESULT DOCUMENTED/REVIEW: ICD-10-PCS | Mod: CPTII,S$GLB,, | Performed by: NURSE PRACTITIONER

## 2023-06-29 PROCEDURE — 3044F HG A1C LEVEL LT 7.0%: CPT | Mod: CPTII,S$GLB,, | Performed by: NURSE PRACTITIONER

## 2023-06-29 PROCEDURE — 3008F BODY MASS INDEX DOCD: CPT | Mod: CPTII,S$GLB,, | Performed by: NURSE PRACTITIONER

## 2023-06-29 PROCEDURE — 83036 HEMOGLOBIN GLYCOSYLATED A1C: CPT | Mod: QW,,, | Performed by: NURSE PRACTITIONER

## 2023-06-29 PROCEDURE — 3075F PR MOST RECENT SYSTOLIC BLOOD PRESS GE 130-139MM HG: ICD-10-PCS | Mod: CPTII,S$GLB,, | Performed by: NURSE PRACTITIONER

## 2023-06-29 PROCEDURE — 99214 PR OFFICE/OUTPT VISIT, EST, LEVL IV, 30-39 MIN: ICD-10-PCS | Mod: S$GLB,,, | Performed by: NURSE PRACTITIONER

## 2023-06-29 PROCEDURE — 3044F PR MOST RECENT HEMOGLOBIN A1C LEVEL <7.0%: ICD-10-PCS | Mod: CPTII,S$GLB,, | Performed by: NURSE PRACTITIONER

## 2023-06-29 NOTE — PROGRESS NOTES
SUBJECTIVE:    Patient ID: Pierre Masters Jr. is a 52 y.o. male.    Chief Complaint: Follow-up (No bottles/ 3 month f/u/bg)    52 year old male presents for check up. Patient is treated for htn, gout, hyperlipidemia, allergies, gerd,, oa and dm. bp in office is well controlled. Has lost 20 lbs in last year. Trying to watch diet more.  A hga1c is 5.4mg/dl.  Still has daily back pain but improving. Up to date on labs. Driving truck. Travels frequently. Due for colonoscopy      Office Visit on 06/29/2023   Component Date Value Ref Range Status    Hemoglobin A1C, POC 06/29/2023 5.4  % Final    Color, UA 06/29/2023 YELLOW  YELLOW Final    Appearance, UA 06/29/2023 CLEAR  CLEAR Final    Specific Gravity, UA 06/29/2023 1.022  1.001 - 1.035 Final    pH, UA 06/29/2023 7.0  5.0 - 8.0 Final    Glucose, UA 06/29/2023 NEGATIVE  NEGATIVE Final    Bilirubin, UA 06/29/2023 NEGATIVE  NEGATIVE Final    Ketones, UA 06/29/2023 NEGATIVE  NEGATIVE Final    Occult Blood UA 06/29/2023 1+ (A)  NEGATIVE Final    Protein, UA 06/29/2023 1+ (A)  NEGATIVE Final    Nitrite, UA 06/29/2023 NEGATIVE  NEGATIVE Final    Leukocytes, UA 06/29/2023 TRACE (A)  NEGATIVE Final    WBC Casts, UA 06/29/2023 10-20 (A)  < OR = 5 /HPF Final    RBC Casts, UA 06/29/2023 10-20 (A)  < OR = 2 /HPF Final    Squam Epithel, UA 06/29/2023 0-5  < OR = 5 /HPF Final    Bacteria, UA 06/29/2023 NONE SEEN  NONE SEEN /HPF Final    Hyaline Casts, UA 06/29/2023 NONE SEEN  NONE SEEN /LPF Final    Service Cmt: 06/29/2023    Final    Reflexive Urine Culture 06/29/2023    Final    Urine Culture, Routine 06/29/2023    Final   Office Visit on 03/29/2023   Component Date Value Ref Range Status    Hemoglobin A1C, POC 03/29/2023 5.5  % Final    WBC 03/29/2023 5.8  3.8 - 10.8 Thousand/uL Final    RBC 03/29/2023 4.74  4.20 - 5.80 Million/uL Final    Hemoglobin 03/29/2023 14.4  13.2 - 17.1 g/dL Final    Hematocrit 03/29/2023 43.5  38.5 - 50.0 % Final    MCV 03/29/2023 91.8  80.0 - 100.0  fL Final    MCH 03/29/2023 30.4  27.0 - 33.0 pg Final    MCHC 03/29/2023 33.1  32.0 - 36.0 g/dL Final    RDW 03/29/2023 12.3  11.0 - 15.0 % Final    Platelets 03/29/2023 238  140 - 400 Thousand/uL Final    MPV 03/29/2023 11.5  7.5 - 12.5 fL Final    Neutrophils, Abs 03/29/2023 2,140  1,500 - 7,800 cells/uL Final    Lymph # 03/29/2023 2,842  850 - 3,900 cells/uL Final    Mono # 03/29/2023 464  200 - 950 cells/uL Final    Eos # 03/29/2023 261  15 - 500 cells/uL Final    Baso # 03/29/2023 93  0 - 200 cells/uL Final    Neutrophils Relative 03/29/2023 36.9  % Final    Lymph % 03/29/2023 49.0  % Final    Mono % 03/29/2023 8.0  % Final    Eosinophil % 03/29/2023 4.5  % Final    Basophil % 03/29/2023 1.6  % Final    Glucose 03/29/2023 103 (H)  65 - 99 mg/dL Final    BUN 03/29/2023 13  7 - 25 mg/dL Final    Creatinine 03/29/2023 1.01  0.70 - 1.30 mg/dL Final    eGFR 03/29/2023 90  > OR = 60 mL/min/1.73m2 Final    BUN/Creatinine Ratio 03/29/2023 NOT APPLICABLE  6 - 22 (calc) Final    Sodium 03/29/2023 143  135 - 146 mmol/L Final    Potassium 03/29/2023 4.1  3.5 - 5.3 mmol/L Final    Chloride 03/29/2023 106  98 - 110 mmol/L Final    CO2 03/29/2023 29  20 - 32 mmol/L Final    Calcium 03/29/2023 9.2  8.6 - 10.3 mg/dL Final    Total Protein 03/29/2023 6.8  6.1 - 8.1 g/dL Final    Albumin 03/29/2023 4.3  3.6 - 5.1 g/dL Final    Globulin, Total 03/29/2023 2.5  1.9 - 3.7 g/dL (calc) Final    Albumin/Globulin Ratio 03/29/2023 1.7  1.0 - 2.5 (calc) Final    Total Bilirubin 03/29/2023 1.0  0.2 - 1.2 mg/dL Final    Alkaline Phosphatase 03/29/2023 90  35 - 144 U/L Final    AST 03/29/2023 23  10 - 35 U/L Final    ALT 03/29/2023 24  9 - 46 U/L Final    Cholesterol 03/29/2023 123  <200 mg/dL Final    HDL 03/29/2023 35 (L)  > OR = 40 mg/dL Final    Triglycerides 03/29/2023 128  <150 mg/dL Final    LDL Cholesterol 03/29/2023 67  mg/dL (calc) Final    HDL/Cholesterol Ratio 03/29/2023 3.5  <5.0 (calc) Final    Non HDL Chol. (LDL+VLDL)  03/29/2023 88  <130 mg/dL (calc) Final    Color, UA 05/31/2023 YELLOW  YELLOW Final    Appearance, UA 05/31/2023 CLEAR  CLEAR Final    Specific Gravity, UA 05/31/2023 1.013  1.001 - 1.035 Final    pH, UA 05/31/2023 5.5  5.0 - 8.0 Final    Glucose, UA 05/31/2023 NEGATIVE  NEGATIVE Final    Bilirubin, UA 05/31/2023 NEGATIVE  NEGATIVE Final    Ketones, UA 05/31/2023 NEGATIVE  NEGATIVE Final    Occult Blood UA 05/31/2023 1+ (A)  NEGATIVE Final    Protein, UA 05/31/2023 NEGATIVE  NEGATIVE Final    Nitrite, UA 05/31/2023 NEGATIVE  NEGATIVE Final    Leukocytes, UA 05/31/2023 1+ (A)  NEGATIVE Final    WBC Casts, UA 05/31/2023 0-5  < OR = 5 /HPF Final    RBC Casts, UA 05/31/2023 NONE SEEN  < OR = 2 /HPF Final    Squam Epithel, UA 05/31/2023 NONE SEEN  < OR = 5 /HPF Final    Bacteria, UA 05/31/2023 NONE SEEN  NONE SEEN /HPF Final    Hyaline Casts, UA 05/31/2023 NONE SEEN  NONE SEEN /LPF Final    Service Cmt: 05/31/2023    Final    Reflexive Urine Culture 05/31/2023    Final    Urine Culture, Routine 05/31/2023    Final    PROSTATE SPECIFIC ANTIGEN, SCR - Q* 03/29/2023 0.27  < OR = 4.00 ng/mL Final    TSH w/reflex to FT4 03/29/2023 1.60  0.40 - 4.50 mIU/L Final    Creatinine, Urine 05/31/2023 144  20 - 320 mg/dL Final    Microalb, Ur 05/31/2023 4.3  See Note: mg/dL Final    Microalb/Creat Ratio 05/31/2023 30 (H)  <30 mcg/mg creat Final       Past Medical History:   Diagnosis Date    Allergy     Asthma     Diabetes mellitus     Diabetes mellitus, type 2     GERD (gastroesophageal reflux disease)     Gout     Gout     Hyperlipidemia     Hypertension     Lupus     Sleep apnea     Sleep apnea      Social History     Socioeconomic History    Marital status:    Tobacco Use    Smoking status: Heavy Smoker     Packs/day: 1.00     Types: Cigarettes    Smokeless tobacco: Never   Substance and Sexual Activity    Alcohol use: No    Drug use: No   Social History Narrative    Always in pain does not sleep well    No exercise     Past  Surgical History:   Procedure Laterality Date    BACK SURGERY      LIPOMA RESECTION      ROTATOR CUFF REPAIR Left      Family History   Problem Relation Age of Onset    Heart disease Mother     Diabetes Father     Hypertension Father     Cancer Father     Atrial fibrillation Brother     Heart disease Brother        Review of patient's allergies indicates:  No Known Allergies    Current Outpatient Medications:     albuterol (PROVENTIL) 5 mg/mL nebulizer solution, Take 2.5 mg by nebulization every 6 (six) hours as needed for Wheezing. Rescue, Disp: , Rfl:     albuterol (PROVENTIL/VENTOLIN HFA) 90 mcg/actuation inhaler, 1-2 puff  Every 6 hrs prn wheeze or sob, Disp: 18 g, Rfl: 2    allopurinoL (ZYLOPRIM) 300 MG tablet, Take 1 tablet (300 mg total) by mouth once daily., Disp: 90 tablet, Rfl: 1    amLODIPine (NORVASC) 5 MG tablet, Take 1 tablet (5 mg total) by mouth once daily., Disp: 90 tablet, Rfl: 1    aspirin 81 MG Chew, Take 81 mg by mouth., Disp: , Rfl:     atorvastatin (LIPITOR) 10 MG tablet, Take 1 tablet (10 mg total) by mouth once daily., Disp: 90 tablet, Rfl: 1    azelastine (ASTELIN) 137 mcg (0.1 %) nasal spray, 1 spray (137 mcg total) by Nasal route 2 (two) times daily., Disp: 30 mL, Rfl: 2    cetirizine (ZYRTEC) 10 MG tablet, Take 1 tablet (10 mg total) by mouth once daily., Disp: 90 tablet, Rfl: 1    chlorthalidone (HYGROTEN) 25 MG Tab, Take 1 tablet (25 mg total) by mouth once daily., Disp: 30 tablet, Rfl: 11    clotrimazole-betamethasone 1-0.05% (LOTRISONE) cream, Apply topically 2 (two) times daily., Disp: 45 g, Rfl: 1    colchicine (COLCRYS) 0.6 mg tablet, TAKE 2 TABLETS AT ONSET OF symptoms AND TAKE 1 TABLET 1 HOUR LATER, Disp: 30 tablet, Rfl: 2    diclofenac (VOLTAREN) 50 MG EC tablet, Take 1 tablet (50 mg total) by mouth 2 (two) times daily as needed (pain)., Disp: 20 tablet, Rfl: 0    fluticasone propionate (FLONASE) 50 mcg/actuation nasal spray, SPRAY TWICE INTO EACH NOSTRIL ONCE A DAY AS NEEDED,  "Disp: 16 g, Rfl: 2    HYDROcodone-acetaminophen (NORCO) 5-325 mg per tablet, Take 1 tablet by mouth every 4 (four) hours as needed for Pain., Disp: 10 tablet, Rfl: 0    losartan (COZAAR) 25 MG tablet, Take 1 tablet (25 mg total) by mouth once daily., Disp: 90 tablet, Rfl: 1    meloxicam (MOBIC) 15 MG tablet, Take 1 tablet (15 mg total) by mouth once daily., Disp: 90 tablet, Rfl: 1    omeprazole (PRILOSEC) 40 MG capsule, Take 1 capsule (40 mg total) by mouth once daily., Disp: 90 capsule, Rfl: 1    pulse oximeter (PULSE OXIMETER) device, by Apply Externally route 2 (two) times a day. Use twice daily at 8 AM and 3 PM and record the value in MyChart as directed., Disp: 1 each, Rfl: 0    sildenafiL (VIAGRA) 25 MG tablet, Take 1 tablet (25 mg total) by mouth daily as needed for Erectile Dysfunction. (Patient not taking: No sig reported), Disp: 30 tablet, Rfl: 2    triamcinolone acetonide 0.1% (KENALOG) 0.1 % ointment, Apply to affected areas twice a day, Disp: 80 g, Rfl: 1    Review of Systems   Constitutional:  Negative for fatigue, fever and unexpected weight change.   HENT:  Negative for ear pain, sinus pressure and sore throat.    Eyes:  Negative for pain.   Respiratory:  Negative for cough and shortness of breath.    Cardiovascular:  Negative for chest pain and leg swelling.   Gastrointestinal:  Negative for abdominal pain, constipation, nausea and vomiting.   Genitourinary:  Negative for dysuria, frequency and urgency.   Musculoskeletal:  Positive for back pain. Negative for arthralgias.   Skin:  Negative for rash.   Neurological:  Negative for dizziness, weakness and headaches.   Psychiatric/Behavioral:  Negative for sleep disturbance.         Objective:      Vitals:    06/29/23 0931   BP: 138/88   Pulse: 83   SpO2: 99%   Weight: 120.2 kg (265 lb)   Height: 5' 6" (1.676 m)     Physical Exam  Constitutional:       General: He is not in acute distress.     Appearance: Normal appearance. He is well-developed. He is " not ill-appearing.   HENT:      Head: Normocephalic and atraumatic.      Right Ear: External ear normal.      Left Ear: External ear normal.   Eyes:      Pupils: Pupils are equal, round, and reactive to light.   Neck:      Trachea: No tracheal deviation.   Cardiovascular:      Rate and Rhythm: Normal rate and regular rhythm.      Heart sounds: No murmur heard.    No friction rub. No gallop.   Pulmonary:      Breath sounds: Normal breath sounds. No stridor. No wheezing or rales.   Abdominal:      Palpations: Abdomen is soft. There is no mass.      Tenderness: There is no abdominal tenderness.   Musculoskeletal:         General: No tenderness or deformity.      Cervical back: Neck supple.   Feet:      Right foot:      Protective Sensation: 5 sites tested.  5 sites sensed.      Left foot:      Protective Sensation: 5 sites tested.  5 sites sensed.   Lymphadenopathy:      Cervical: No cervical adenopathy.   Skin:     General: Skin is warm and dry.   Neurological:      Mental Status: He is alert and oriented to person, place, and time.      Coordination: Coordination normal.   Psychiatric:         Thought Content: Thought content normal.         Assessment:       1. Type 2 diabetes mellitus with other specified complication, unspecified whether long term insulin use    2. Hematuria, unspecified type    3. Gastroesophageal reflux disease, unspecified whether esophagitis present    4. Hyperlipidemia, unspecified hyperlipidemia type    5. Benign essential HTN         Plan:       Type 2 diabetes mellitus with other specified complication, unspecified whether long term insulin use  Comments:  well controlled  Orders:  -     Hemoglobin A1C, POCT    Hematuria, unspecified type  Comments:  repeat ua  Orders:  -     Urinalysis, Reflex to Urine Culture Urine, Clean Catch; Future; Expected date: 06/29/2023    Gastroesophageal reflux disease, unspecified whether esophagitis present  Comments:  prilosec controlling symptoms.      Hyperlipidemia, unspecified hyperlipidemia type  Comments:  continue lipitor    Benign essential HTN  Comments:  controlled      Follow up in about 6 months (around 12/29/2023), or if symptoms worsen or fail to improve, for medication management.        7/10/2023 Idalia Hercules

## 2023-07-01 LAB
APPEARANCE UR: CLEAR
BACTERIA #/AREA URNS HPF: ABNORMAL /HPF
BACTERIA UR CULT: ABNORMAL
BACTERIA UR CULT: ABNORMAL
BILIRUB UR QL STRIP: NEGATIVE
COLOR UR: YELLOW
GLUCOSE UR QL STRIP: NEGATIVE
HGB UR QL STRIP: ABNORMAL
HYALINE CASTS #/AREA URNS LPF: ABNORMAL /LPF
KETONES UR QL STRIP: NEGATIVE
LEUKOCYTE ESTERASE UR QL STRIP: ABNORMAL
NITRITE UR QL STRIP: NEGATIVE
PH UR STRIP: 7 [PH] (ref 5–8)
PROT UR QL STRIP: ABNORMAL
RBC #/AREA URNS HPF: ABNORMAL /HPF
SERVICE CMNT-IMP: ABNORMAL
SP GR UR STRIP: 1.02 (ref 1–1.03)
SQUAMOUS #/AREA URNS HPF: ABNORMAL /HPF
WBC #/AREA URNS HPF: ABNORMAL /HPF

## 2023-07-03 ENCOUNTER — TELEPHONE (OUTPATIENT)
Dept: FAMILY MEDICINE | Facility: CLINIC | Age: 53
End: 2023-07-03

## 2023-07-10 ENCOUNTER — TELEPHONE (OUTPATIENT)
Dept: FAMILY MEDICINE | Facility: CLINIC | Age: 53
End: 2023-07-10

## 2023-07-10 DIAGNOSIS — R31.9 HEMATURIA, UNSPECIFIED TYPE: Primary | ICD-10-CM

## 2023-07-10 NOTE — TELEPHONE ENCOUNTER
Spoke with pt in regards to recent urinalysis results. Verbalized per Idalia that he is still positive for blood in the urine. Idalia recommends referral to Dr. Ellison. Pt acknowledged understanding.

## 2023-07-10 NOTE — TELEPHONE ENCOUNTER
----- Message from Idalia Hercules NP sent at 7/10/2023  1:41 AM CDT -----  Urine still is positive for blood. Refer to dr. Ellison for further evaluation

## 2023-07-11 ENCOUNTER — TELEPHONE (OUTPATIENT)
Dept: FAMILY MEDICINE | Facility: CLINIC | Age: 53
End: 2023-07-11
Payer: MEDICAID

## 2023-12-07 ENCOUNTER — TELEPHONE (OUTPATIENT)
Dept: FAMILY MEDICINE | Facility: CLINIC | Age: 53
End: 2023-12-07

## 2023-12-07 DIAGNOSIS — Z79.899 ENCOUNTER FOR LONG-TERM (CURRENT) USE OF OTHER MEDICATIONS: ICD-10-CM

## 2023-12-07 DIAGNOSIS — I10 BENIGN ESSENTIAL HTN: ICD-10-CM

## 2023-12-07 DIAGNOSIS — E78.5 HYPERLIPIDEMIA, UNSPECIFIED HYPERLIPIDEMIA TYPE: ICD-10-CM

## 2023-12-07 DIAGNOSIS — E11.69 TYPE 2 DIABETES MELLITUS WITH OTHER SPECIFIED COMPLICATION, UNSPECIFIED WHETHER LONG TERM INSULIN USE: Primary | ICD-10-CM

## 2023-12-07 NOTE — TELEPHONE ENCOUNTER
Spoke with patient to inform him that he is due for labs before his visit. Pt states he will be out of town on his appointment date. Pt. States he will call to reschedule tomorrow.

## 2024-01-02 ENCOUNTER — TELEPHONE (OUTPATIENT)
Dept: FAMILY MEDICINE | Facility: CLINIC | Age: 54
End: 2024-01-02
Payer: MEDICAID

## 2024-01-02 ENCOUNTER — TELEPHONE (OUTPATIENT)
Dept: FAMILY MEDICINE | Facility: CLINIC | Age: 54
End: 2024-01-02

## 2024-01-02 DIAGNOSIS — E78.5 HYPERLIPIDEMIA, UNSPECIFIED HYPERLIPIDEMIA TYPE: ICD-10-CM

## 2024-01-02 DIAGNOSIS — E11.69 TYPE 2 DIABETES MELLITUS WITH OTHER SPECIFIED COMPLICATION, UNSPECIFIED WHETHER LONG TERM INSULIN USE: ICD-10-CM

## 2024-01-02 DIAGNOSIS — I10 BENIGN ESSENTIAL HTN: ICD-10-CM

## 2024-01-02 DIAGNOSIS — Z79.899 ENCOUNTER FOR LONG-TERM (CURRENT) USE OF OTHER MEDICATIONS: Primary | ICD-10-CM

## 2024-01-02 NOTE — TELEPHONE ENCOUNTER
----- Message from Scott Traylor sent at 1/2/2024  8:27 AM CST -----  Pt is here and wants to know if the nurse could look at his eye.  [phone]

## 2024-01-06 ENCOUNTER — HOSPITAL ENCOUNTER (EMERGENCY)
Facility: HOSPITAL | Age: 54
Discharge: HOME | End: 2024-01-06
Attending: EMERGENCY MEDICINE
Payer: MEDICAID

## 2024-01-06 VITALS
TEMPERATURE: 98 F | HEIGHT: 66 IN | SYSTOLIC BLOOD PRESSURE: 175 MMHG | WEIGHT: 270 LBS | DIASTOLIC BLOOD PRESSURE: 90 MMHG | BODY MASS INDEX: 43.39 KG/M2 | OXYGEN SATURATION: 96 % | RESPIRATION RATE: 20 BRPM | HEART RATE: 94 BPM

## 2024-01-06 DIAGNOSIS — H16.002 CORNEAL ULCER OF LEFT EYE: Primary | ICD-10-CM

## 2024-01-06 PROCEDURE — 99283 EMERGENCY DEPT VISIT LOW MDM: CPT

## 2024-01-06 PROCEDURE — 25000003 PHARM REV CODE 250: Performed by: EMERGENCY MEDICINE

## 2024-01-06 RX ORDER — MOXIFLOXACIN 5 MG/ML
1 SOLUTION/ DROPS OPHTHALMIC
Status: COMPLETED | OUTPATIENT
Start: 2024-01-06 | End: 2024-01-06

## 2024-01-06 RX ORDER — MOXIFLOXACIN 5 MG/ML
1 SOLUTION/ DROPS OPHTHALMIC
Qty: 3 ML | Refills: 0 | Status: SHIPPED | OUTPATIENT
Start: 2024-01-06 | End: 2024-01-08

## 2024-01-06 RX ORDER — PROPARACAINE HYDROCHLORIDE 5 MG/ML
2 SOLUTION/ DROPS OPHTHALMIC
Status: COMPLETED | OUTPATIENT
Start: 2024-01-06 | End: 2024-01-06

## 2024-01-06 RX ADMIN — PROPARACAINE HYDROCHLORIDE 2 DROP: 5 SOLUTION/ DROPS OPHTHALMIC at 08:01

## 2024-01-06 RX ADMIN — MOXIFLOXACIN 1 DROP: 5 SOLUTION/ DROPS OPHTHALMIC at 08:01

## 2024-01-06 RX ADMIN — FLUORESCEIN SODIUM 1 EACH: 1 STRIP OPHTHALMIC at 08:01

## 2024-01-06 NOTE — DISCHARGE INSTRUCTIONS
MAKE SURE TO PUT A DROP OF ANTIBIOTICS IN THE LEFT EYE EVERY HOUR FOR THE NEXT 48 HOURS UNTIL THE OPHTHALMOLOGIST TELLS YOU OTHERWISE.  DO NOT MISS YOUR OPHTHALMOLOGY APPOINTMENT.

## 2024-01-06 NOTE — ED PROVIDER NOTES
Encounter Date: 1/6/2024       History     Chief Complaint   Patient presents with    Eye Drainage     X 1 month left eye      Patient is a 53-year-old male with a past medical history of type 2 diabetes hypertension hyperlipidemia lupus asthma who presents the emergency room for evaluation of left eye pain and injection for the past month.  Urgent care prescribed him ointment 2 weeks ago.  He is a .  Symptoms seem to be getting worse.  He has some drainage.  He denies wearing contacts.  He denies any foreign body.      Review of patient's allergies indicates:  No Known Allergies  Past Medical History:   Diagnosis Date    Allergy     Asthma     Diabetes mellitus     Diabetes mellitus, type 2     GERD (gastroesophageal reflux disease)     Gout     Gout     Hyperlipidemia     Hypertension     Lupus     Sleep apnea     Sleep apnea      Past Surgical History:   Procedure Laterality Date    BACK SURGERY      LIPOMA RESECTION      ROTATOR CUFF REPAIR Left      Family History   Problem Relation Age of Onset    Heart disease Mother     Diabetes Father     Hypertension Father     Cancer Father     Atrial fibrillation Brother     Heart disease Brother      Social History     Tobacco Use    Smoking status: Heavy Smoker     Current packs/day: 1.00     Types: Cigarettes    Smokeless tobacco: Never   Substance Use Topics    Alcohol use: No    Drug use: No     Review of Systems   Constitutional:  Negative for fever.   HENT:  Negative for congestion and rhinorrhea.    Eyes:  Positive for photophobia, pain, discharge, redness, itching and visual disturbance.   Respiratory:  Negative for cough and shortness of breath.    Cardiovascular:  Negative for chest pain.   Gastrointestinal:  Negative for abdominal pain, nausea and vomiting.   Neurological:  Negative for weakness, numbness and headaches.   Psychiatric/Behavioral:  Negative for agitation and confusion.        Physical Exam     Initial Vitals [01/06/24 0741]   BP  Pulse Resp Temp SpO2   (!) 175/90 94 20 98.2 °F (36.8 °C) 96 %      MAP       --         Physical Exam    Nursing note and vitals reviewed.  Constitutional: He appears well-developed and well-nourished. No distress.   HENT:   Head: Normocephalic and atraumatic.   Eyes:   Patient has proptosis of both eyes.  He has a visible corneal ulcer at the 4 o'clock position of the left eye with injected conjunctiva of the left eye.  Extraocular eye movements are intact and pupils are normal.  Anterior chamber appears to be clear.   Neck: Neck supple. No thyromegaly present.   Cardiovascular:  Normal rate and regular rhythm.           Pulmonary/Chest: Breath sounds normal.   Musculoskeletal:      Cervical back: Neck supple.     Neurological: He is alert and oriented to person, place, and time. He has normal strength. No sensory deficit.   Skin: Skin is warm and dry. No rash noted.   Psychiatric: He has a normal mood and affect.         ED Course   Procedures  Labs Reviewed - No data to display       Imaging Results    None          Medications   fluorescein ophthalmic strip 1 each (1 each Left Eye Given 1/6/24 0815)   proparacaine 0.5 % ophthalmic solution 2 drop (2 drops Left Eye Given 1/6/24 0848)   moxifloxacin 0.5 % ophthalmic solution 1 drop (1 drop Left Eye Given 1/6/24 0847)     Medical Decision Making  Patient appears have corneal ulcer in the left eye.  I will start him on Vigamox every hour for the next 48 hours and follow up with Ophthalmology.  I was able to schedule an appointment with an ophthalmologist in 2 days.  I do not think he has acute angle closure glaucoma.  He does have proptosis which could be related to lupus.  I doubt this is optic neuritis.  No signs of iritis.  I do not think he needs steroids.  I do not believe this is herpetic keratitis.  Patient is stable for discharge at this time.    Risk  Prescription drug management.               ED Course as of 01/06/24 0920   Sat Jan 06, 2024   0915  Patient appears have a corneal ulcer.  We were able to schedule a follow-up appointment with Ophthalmology at 2:00 p.m. on Monday.  I will have him use Vigamox every hour for the next 48 hours until told otherwise by the ophthalmologist.  He also has proptosis and this may be related to lupus.  He has normal pressures in the eye 14.  There is no signs of orbital cellulitis as his extraocular eye movements are intact I do not think he has iritis.  Scleritis is a possibility.  With close outpatient follow up on Monday I feel the patient is stable for discharge at this time. [JS]      ED Course User Index  [JS] Barrington Moran MD                           Clinical Impression:  Final diagnoses:  [H16.002] Corneal ulcer of left eye (Primary)          ED Disposition Condition    Discharge Stable          ED Prescriptions       Medication Sig Dispense Start Date End Date Auth. Provider    moxifloxacin (VIGAMOX) 0.5 % ophthalmic solution Place 1 drop into the left eye every hour. for 2 days 3 mL 1/6/2024 1/8/2024 Barrington Moran MD          Follow-up Information       Follow up With Specialties Details Why Contact Info    Sonja Plasencia OD Optometry  You have an appointment on Monday 1/8/24 at 2pm.  DO NOT MISS THIS APPOINTMENT! [address] [place] [address]  [phone]               Barrington Moran MD  01/06/24 3164

## 2024-01-08 ENCOUNTER — OFFICE VISIT (OUTPATIENT)
Dept: OPTOMETRY | Facility: CLINIC | Age: 54
End: 2024-01-08
Payer: MEDICAID

## 2024-01-08 DIAGNOSIS — H20.9 UVEITIS OF LEFT EYE: ICD-10-CM

## 2024-01-08 DIAGNOSIS — H16.002 CORNEAL ULCER OF LEFT EYE: Primary | ICD-10-CM

## 2024-01-08 PROCEDURE — 99213 OFFICE O/P EST LOW 20 MIN: CPT | Mod: PBBFAC,PO

## 2024-01-08 PROCEDURE — 99203 OFFICE O/P NEW LOW 30 MIN: CPT | Mod: S$PBB,,,

## 2024-01-08 PROCEDURE — 99999 PR PBB SHADOW E&M-EST. PATIENT-LVL III: CPT | Mod: PBBFAC,,,

## 2024-01-08 PROCEDURE — 1159F MED LIST DOCD IN RCRD: CPT | Mod: CPTII,,,

## 2024-01-08 RX ORDER — ALBUTEROL SULFATE 90 UG/1
AEROSOL, METERED RESPIRATORY (INHALATION)
COMMUNITY

## 2024-01-08 RX ORDER — INDOMETHACIN 50 MG/1
50 CAPSULE ORAL 3 TIMES DAILY
COMMUNITY
Start: 2023-11-25

## 2024-01-08 RX ORDER — LINACLOTIDE 145 UG/1
145 CAPSULE, GELATIN COATED ORAL EVERY MORNING
COMMUNITY
Start: 2023-10-22

## 2024-01-08 RX ORDER — METFORMIN HYDROCHLORIDE 1000 MG/1
1000 TABLET ORAL 2 TIMES DAILY
COMMUNITY
Start: 2023-07-25

## 2024-01-08 RX ORDER — LISINOPRIL 10 MG/1
10 TABLET ORAL
COMMUNITY

## 2024-01-08 NOTE — PROGRESS NOTES
HPI    Pt here for K ulcer follow-up OS (ED follow-up) DLS - (01/06/2024)    Pt states he was diagnosed with a K Ulcer OS at ER and was given Vigamox   to use q1h OS.     Pt states symptoms have been ongoing x 1 month. Pt reports blurry vision,   pain, and tenderness. Pt denies contact lens wear.  Last edited by Sonja Plasencia, OD on 1/8/2024  1:48 PM.            Assessment /Plan     For exam results, see Encounter Report.    Corneal ulcer of left eye    Uveitis of left eye      1-2. Pt seen at ED 01/06/2024 for symptoms of ongoing redness, tearing, and pain OS x 1 month. Pt was diagnosed with corneal ulcer and rx'd Vigamox to use q1h OS. Pt reports that since using drops eyes feel slightly better. Upon examination today, 2.4 mm x 2.2 mm epithelial defect with full NaFl staining temporal OS with surrounding edema and keratic precipitates. No cells/flare in AC. IOP WNL. Spoke with Dr. Montana's office and got pt appt for 01/09/23 at 10:30 am in [place]. Per Dr. Montana's assistant, advised pt to discontinue all drops until evaluation by Dr. Montana. Emphasized importance of prompt follow-up and treatment to prevent any permanent vision loss. Pt demonstrated understanding.    RTC: to Dr. Montana.

## 2024-01-22 DIAGNOSIS — G89.29 CHRONIC RIGHT SHOULDER PAIN: ICD-10-CM

## 2024-01-22 DIAGNOSIS — E11.69 TYPE 2 DIABETES MELLITUS WITH OTHER SPECIFIED COMPLICATION, UNSPECIFIED WHETHER LONG TERM INSULIN USE: ICD-10-CM

## 2024-01-22 DIAGNOSIS — I10 BENIGN ESSENTIAL HTN: ICD-10-CM

## 2024-01-22 DIAGNOSIS — M25.511 CHRONIC RIGHT SHOULDER PAIN: ICD-10-CM

## 2024-01-22 DIAGNOSIS — J30.2 SEASONAL ALLERGIES: ICD-10-CM

## 2024-01-22 DIAGNOSIS — K21.9 GASTROESOPHAGEAL REFLUX DISEASE, UNSPECIFIED WHETHER ESOPHAGITIS PRESENT: ICD-10-CM

## 2024-01-22 DIAGNOSIS — M54.12 CERVICAL RADICULOPATHY: ICD-10-CM

## 2024-01-22 DIAGNOSIS — E78.5 HYPERLIPIDEMIA, UNSPECIFIED HYPERLIPIDEMIA TYPE: ICD-10-CM

## 2024-01-22 DIAGNOSIS — R60.9 EDEMA, UNSPECIFIED TYPE: ICD-10-CM

## 2024-01-22 NOTE — TELEPHONE ENCOUNTER
Spoke with pt who states he needs a refill on his albuterol, cetirizine, mobic, omeprazole, amlodipine, hygroten, azelastine, colchicine, atorvastatin, and losartan.     Last OV 6/29/2023  Upcoming OV 3/5/2024    Rx order set up.

## 2024-01-22 NOTE — TELEPHONE ENCOUNTER
----- Message from Emely Mendoza sent at 1/22/2024 12:55 PM CST -----  The patient needs refills can he be seen tomorrow.? Pt's # [phone] GH

## 2024-01-22 NOTE — TELEPHONE ENCOUNTER
I am leaving early tomorrow. He was not seen recently due to insurance issue. Need to clarify with front that we are on his plan

## 2024-01-24 RX ORDER — COLCHICINE 0.6 MG/1
TABLET ORAL
Qty: 30 TABLET | Refills: 0 | Status: SHIPPED | OUTPATIENT
Start: 2024-01-24 | End: 2024-04-03 | Stop reason: SDUPTHER

## 2024-01-24 RX ORDER — LOSARTAN POTASSIUM 25 MG/1
25 TABLET ORAL DAILY
Qty: 30 TABLET | Refills: 0 | Status: SHIPPED | OUTPATIENT
Start: 2024-01-24 | End: 2024-04-03 | Stop reason: SDUPTHER

## 2024-01-24 RX ORDER — AZELASTINE 1 MG/ML
1 SPRAY, METERED NASAL 2 TIMES DAILY
Qty: 30 ML | Refills: 0 | Status: SHIPPED | OUTPATIENT
Start: 2024-01-24 | End: 2024-04-03 | Stop reason: SDUPTHER

## 2024-01-24 RX ORDER — CETIRIZINE HYDROCHLORIDE 10 MG/1
10 TABLET ORAL DAILY
Qty: 30 TABLET | Refills: 0 | Status: SHIPPED | OUTPATIENT
Start: 2024-01-24 | End: 2024-04-03 | Stop reason: SDUPTHER

## 2024-01-24 RX ORDER — ATORVASTATIN CALCIUM 10 MG/1
10 TABLET, FILM COATED ORAL DAILY
Qty: 30 TABLET | Refills: 0 | Status: SHIPPED | OUTPATIENT
Start: 2024-01-24 | End: 2024-04-03 | Stop reason: SDUPTHER

## 2024-01-24 RX ORDER — ALBUTEROL SULFATE 90 UG/1
AEROSOL, METERED RESPIRATORY (INHALATION)
Qty: 18 G | Refills: 0 | Status: SHIPPED | OUTPATIENT
Start: 2024-01-24 | End: 2024-04-03

## 2024-01-24 RX ORDER — MELOXICAM 15 MG/1
15 TABLET ORAL DAILY
Qty: 30 TABLET | Refills: 0 | Status: SHIPPED | OUTPATIENT
Start: 2024-01-24 | End: 2024-04-03 | Stop reason: SDUPTHER

## 2024-01-24 RX ORDER — AMLODIPINE BESYLATE 5 MG/1
5 TABLET ORAL DAILY
Qty: 30 TABLET | Refills: 0 | Status: SHIPPED | OUTPATIENT
Start: 2024-01-24 | End: 2024-04-03 | Stop reason: SDUPTHER

## 2024-01-24 RX ORDER — CHLORTHALIDONE 25 MG/1
25 TABLET ORAL DAILY
Qty: 30 TABLET | Refills: 0 | Status: SHIPPED | OUTPATIENT
Start: 2024-01-24 | End: 2024-04-03 | Stop reason: SDUPTHER

## 2024-01-24 RX ORDER — OMEPRAZOLE 40 MG/1
40 CAPSULE, DELAYED RELEASE ORAL DAILY
Qty: 30 CAPSULE | Refills: 0 | Status: SHIPPED | OUTPATIENT
Start: 2024-01-24 | End: 2024-04-03 | Stop reason: SDUPTHER

## 2024-01-24 NOTE — TELEPHONE ENCOUNTER
Needs actual visit.   Left on 1/2 without being seen. Overdue. When they are overdue I will not fill rx for months/year. uncontrolled Htn, dm2,

## 2024-01-27 LAB
ALBUMIN SERPL-MCNC: 3.9 G/DL (ref 3.6–5.1)
ALBUMIN/GLOB SERPL: 1.6 (CALC) (ref 1–2.5)
ALP SERPL-CCNC: 75 U/L (ref 35–144)
ALT SERPL-CCNC: 19 U/L (ref 9–46)
AST SERPL-CCNC: 22 U/L (ref 10–35)
BASOPHILS # BLD AUTO: 89 CELLS/UL (ref 0–200)
BASOPHILS NFR BLD AUTO: 1.2 %
BILIRUB SERPL-MCNC: 0.8 MG/DL (ref 0.2–1.2)
BUN SERPL-MCNC: 11 MG/DL (ref 7–25)
BUN/CREAT SERPL: ABNORMAL (CALC) (ref 6–22)
CALCIUM SERPL-MCNC: 8.9 MG/DL (ref 8.6–10.3)
CHLORIDE SERPL-SCNC: 103 MMOL/L (ref 98–110)
CHOLEST SERPL-MCNC: 157 MG/DL
CHOLEST/HDLC SERPL: 5.6 (CALC)
CO2 SERPL-SCNC: 28 MMOL/L (ref 20–32)
CREAT SERPL-MCNC: 1.12 MG/DL (ref 0.7–1.3)
EGFR: 79 ML/MIN/1.73M2
EOSINOPHIL # BLD AUTO: 252 CELLS/UL (ref 15–500)
EOSINOPHIL NFR BLD AUTO: 3.4 %
ERYTHROCYTE [DISTWIDTH] IN BLOOD BY AUTOMATED COUNT: 12.3 % (ref 11–15)
GLOBULIN SER CALC-MCNC: 2.4 G/DL (CALC) (ref 1.9–3.7)
GLUCOSE SERPL-MCNC: 124 MG/DL (ref 65–99)
HBA1C MFR BLD: 6 % OF TOTAL HGB
HCT VFR BLD AUTO: 43 % (ref 38.5–50)
HDLC SERPL-MCNC: 28 MG/DL
HGB BLD-MCNC: 14.5 G/DL (ref 13.2–17.1)
LDLC SERPL CALC-MCNC: 101 MG/DL (CALC)
LYMPHOCYTES # BLD AUTO: 2945 CELLS/UL (ref 850–3900)
LYMPHOCYTES NFR BLD AUTO: 39.8 %
MCH RBC QN AUTO: 30.9 PG (ref 27–33)
MCHC RBC AUTO-ENTMCNC: 33.7 G/DL (ref 32–36)
MCV RBC AUTO: 91.5 FL (ref 80–100)
MONOCYTES # BLD AUTO: 599 CELLS/UL (ref 200–950)
MONOCYTES NFR BLD AUTO: 8.1 %
NEUTROPHILS # BLD AUTO: 3515 CELLS/UL (ref 1500–7800)
NEUTROPHILS NFR BLD AUTO: 47.5 %
NONHDLC SERPL-MCNC: 129 MG/DL (CALC)
PLATELET # BLD AUTO: 276 THOUSAND/UL (ref 140–400)
PMV BLD REES-ECKER: 11.2 FL (ref 7.5–12.5)
POTASSIUM SERPL-SCNC: 3.7 MMOL/L (ref 3.5–5.3)
PROT SERPL-MCNC: 6.3 G/DL (ref 6.1–8.1)
RBC # BLD AUTO: 4.7 MILLION/UL (ref 4.2–5.8)
SODIUM SERPL-SCNC: 140 MMOL/L (ref 135–146)
TRIGL SERPL-MCNC: 182 MG/DL
TSH SERPL-ACNC: 1.09 MIU/L (ref 0.4–4.5)
WBC # BLD AUTO: 7.4 THOUSAND/UL (ref 3.8–10.8)

## 2024-01-29 ENCOUNTER — TELEPHONE (OUTPATIENT)
Dept: FAMILY MEDICINE | Facility: CLINIC | Age: 54
End: 2024-01-29
Payer: MEDICAID

## 2024-01-29 NOTE — PROGRESS NOTES
Call patient.  Cholesterol excellent at 157 triglycerides elevated 182.  Sugar kidneys , thyroid and liver look great A1c 6.0.  CBC shows no anemia.  Continue current medications

## 2024-01-29 NOTE — TELEPHONE ENCOUNTER
----- Message from Lenard Rodriguez MD sent at 1/28/2024  7:38 PM CST -----  Call patient.  Cholesterol excellent at 157 triglycerides elevated 182.  Sugar kidneys , thyroid and liver look great A1c 6.0.  CBC shows no anemia.  Continue current medications

## 2024-02-09 ENCOUNTER — TELEPHONE (OUTPATIENT)
Dept: FAMILY MEDICINE | Facility: CLINIC | Age: 54
End: 2024-02-09
Payer: COMMERCIAL

## 2024-02-09 DIAGNOSIS — R31.9 HEMATURIA, UNSPECIFIED TYPE: Primary | ICD-10-CM

## 2024-02-09 NOTE — TELEPHONE ENCOUNTER
----- Message from Helen Willams sent at 2/9/2024 10:58 AM CST -----  Pt has gallstones and needs a referral to urology. He is [place] right now and will be home Thursday   [phone]

## 2024-02-19 ENCOUNTER — TELEPHONE (OUTPATIENT)
Dept: FAMILY MEDICINE | Facility: CLINIC | Age: 54
End: 2024-02-19
Payer: COMMERCIAL

## 2024-02-19 NOTE — TELEPHONE ENCOUNTER
----- Message from Ya Saucedo MA sent at 2/19/2024  9:14 AM CST -----  Patient is returning call. -BULL

## 2024-02-19 NOTE — TELEPHONE ENCOUNTER
He may have to go to er. Or call urology clinic and explain to them what is going on they may be able to get him sooner with another provider.

## 2024-02-19 NOTE — TELEPHONE ENCOUNTER
----- Message from Helen Willams sent at 2/19/2024  8:13 AM CST -----  Pt has a galstones and has a stent inside of him. He got it put in when he was out of town. He is still having pain and not sure what to do   [phone]

## 2024-02-19 NOTE — TELEPHONE ENCOUNTER
Spoke to pt and he stated he has gallstones and has stents put him but he is having pain when he urinates now. No fever or blood in urine Pt stated he tried to get in with urology but he can't be seen until 2/29

## 2024-02-20 ENCOUNTER — TELEPHONE (OUTPATIENT)
Dept: UROLOGY | Facility: CLINIC | Age: 54
End: 2024-02-20
Payer: COMMERCIAL

## 2024-02-20 NOTE — TELEPHONE ENCOUNTER
Returned call and spoke with patient, informed there is no sooner appts and he needs to be seen by the doctor, advised if pain intolerable he may want to present to ER for faster evaluation. Patient stated he has stents placed in [place], informed the stents will cause blood in the urine, but he is able to urinate. He will monitor and go to ED if things worsen, patient verbally understood.

## 2024-02-20 NOTE — TELEPHONE ENCOUNTER
----- Message from Viola Kang sent at 2/20/2024 11:22 AM CST -----  Regarding: Pt requesting a call back fro clinical staff  Who Called: Patient    What is the request in detail: Requesting call back to discuss scheduling NP appointment from attached referral to be seen for the following:      Can the clinic reply by [facility]? NO    Best Call Back Number: [phone]    Additional Information:  Pt called stating he is in a lot of pain when urinating, and there is blood in his urine.  Pt has a scheduled appointment with Dr. Wolfe on 2/29 @ 2:30, but shared his pain is unbearable.

## 2024-03-12 ENCOUNTER — PATIENT MESSAGE (OUTPATIENT)
Dept: ADMINISTRATIVE | Facility: HOSPITAL | Age: 54
End: 2024-03-12
Payer: COMMERCIAL

## 2024-03-28 ENCOUNTER — PATIENT MESSAGE (OUTPATIENT)
Dept: ADMINISTRATIVE | Facility: HOSPITAL | Age: 54
End: 2024-03-28
Payer: COMMERCIAL

## 2024-04-01 ENCOUNTER — OFFICE VISIT (OUTPATIENT)
Dept: UROLOGY | Facility: CLINIC | Age: 54
End: 2024-04-01
Payer: COMMERCIAL

## 2024-04-01 DIAGNOSIS — N22 CALCULUS OF URINARY TRACT IN DISEASES CLASSIFIED ELSEWHERE: Primary | ICD-10-CM

## 2024-04-01 DIAGNOSIS — R31.9 HEMATURIA, UNSPECIFIED TYPE: ICD-10-CM

## 2024-04-01 LAB
BILIRUBIN, UA POC OHS: NEGATIVE
BLOOD, UA POC OHS: ABNORMAL
CLARITY, UA POC OHS: ABNORMAL
COLOR, UA POC OHS: YELLOW
GLUCOSE, UA POC OHS: NEGATIVE
KETONES, UA POC OHS: NEGATIVE
LEUKOCYTES, UA POC OHS: ABNORMAL
NITRITE, UA POC OHS: POSITIVE
PH, UA POC OHS: 7
PROTEIN, UA POC OHS: >=300
SPECIFIC GRAVITY, UA POC OHS: 1.02
UROBILINOGEN, UA POC OHS: 1

## 2024-04-01 PROCEDURE — 99204 OFFICE O/P NEW MOD 45 MIN: CPT | Mod: S$GLB,,, | Performed by: STUDENT IN AN ORGANIZED HEALTH CARE EDUCATION/TRAINING PROGRAM

## 2024-04-01 PROCEDURE — 87086 URINE CULTURE/COLONY COUNT: CPT | Performed by: STUDENT IN AN ORGANIZED HEALTH CARE EDUCATION/TRAINING PROGRAM

## 2024-04-01 PROCEDURE — 99212 OFFICE O/P EST SF 10 MIN: CPT | Mod: PBBFAC,PO | Performed by: STUDENT IN AN ORGANIZED HEALTH CARE EDUCATION/TRAINING PROGRAM

## 2024-04-01 PROCEDURE — 81003 URINALYSIS AUTO W/O SCOPE: CPT | Mod: PBBFAC,PO | Performed by: STUDENT IN AN ORGANIZED HEALTH CARE EDUCATION/TRAINING PROGRAM

## 2024-04-01 PROCEDURE — 99999 PR PBB SHADOW E&M-EST. PATIENT-LVL II: CPT | Mod: PBBFAC,,, | Performed by: STUDENT IN AN ORGANIZED HEALTH CARE EDUCATION/TRAINING PROGRAM

## 2024-04-01 RX ORDER — SULFAMETHOXAZOLE AND TRIMETHOPRIM 800; 160 MG/1; MG/1
1 TABLET ORAL 2 TIMES DAILY
Qty: 14 TABLET | Refills: 0 | Status: SHIPPED | OUTPATIENT
Start: 2024-04-01 | End: 2024-04-08

## 2024-04-01 RX ORDER — PHENAZOPYRIDINE HYDROCHLORIDE 100 MG/1
100 TABLET, FILM COATED ORAL 3 TIMES DAILY PRN
Qty: 30 TABLET | Refills: 3 | Status: SHIPPED | OUTPATIENT
Start: 2024-04-01 | End: 2024-04-11

## 2024-04-01 RX ORDER — TAMSULOSIN HYDROCHLORIDE 0.4 MG/1
0.4 CAPSULE ORAL DAILY
Qty: 30 CAPSULE | Refills: 11 | Status: SHIPPED | OUTPATIENT
Start: 2024-04-01

## 2024-04-01 NOTE — PROGRESS NOTES
[facility] Note    PCP: Idalia Hercules NP    Chief Complaint: gross hematuria     SUBJECTIVE:       History of Present Illness:  Pierre Masters Jr. is a 53 y.o. male who presents to clinic for gross hematuria. He is New  to our clinic.     States that he had ureteral stents placed 3 months ago for kidney stones.   He is a  and this happened in [place]. He lives here.     He does think he passed some stones.   Has been having gross hematuria and dysuria.    No prior stone hx.      CT 2/7/24: 10 mm left mid ureteral stone, 4 mm right UVJ stone, bilateral non-obstructing stones    UA today: +blood, nitrite, leuks  PVR today: 0 cc    Last urine culture: no documented UTIs    Lab Results   Component Value Date    CREATININE 1.12 01/26/2024     Family  hx: sister has hx of stones; father had prostate cancer  Hx of GILBERT    Past medical, family, and social history reviewed as documented in chart with pertinent positive medical, family, and social history detailed in HPI.    Review of patient's allergies indicates:  No Known Allergies    Past Medical History:   Diagnosis Date    Allergy     Asthma     Diabetes mellitus     Diabetes mellitus, type 2     GERD (gastroesophageal reflux disease)     Gout     Gout     Hyperlipidemia     Hypertension     Lupus     Sleep apnea     Sleep apnea      Past Surgical History:   Procedure Laterality Date    BACK SURGERY      LIPOMA RESECTION      ROTATOR CUFF REPAIR Left      Family History   Problem Relation Age of Onset    Heart disease Mother     Diabetes Father     Hypertension Father     Cancer Father     Atrial fibrillation Brother     Heart disease Brother      Social History     Tobacco Use    Smoking status: Heavy Smoker     Current packs/day: 1.00     Types: Cigarettes    Smokeless tobacco: Never   Substance Use Topics    Alcohol use: No    Drug use: No        Review of Systems    OBJECTIVE:     Anticoagulation:  no    Estimated body mass index  "is 43.58 kg/m² as calculated from the following:    Height as of 1/6/24: 5' 6" (1.676 m).    Weight as of 1/6/24: 122.5 kg (270 lb).    Vital Signs (Most Recent)       Physical Exam  Constitutional:       General: He is not in acute distress.     Appearance: Normal appearance. He is not ill-appearing.   HENT:      Head: Normocephalic and atraumatic.   Eyes:      General: No scleral icterus.  Pulmonary:      Effort: Pulmonary effort is normal. No respiratory distress.   Abdominal:      General: There is no distension.   Skin:     Coloration: Skin is not jaundiced.   Neurological:      General: No focal deficit present.      Mental Status: He is alert and oriented to person, place, and time.   Psychiatric:         Mood and Affect: Mood normal.         Behavior: Behavior normal.         Thought Content: Thought content normal.         BMP  Lab Results   Component Value Date     01/26/2024    K 3.7 01/26/2024     01/26/2024    CO2 28 01/26/2024    BUN 11 01/26/2024    CREATININE 1.12 01/26/2024    CALCIUM 8.9 01/26/2024    ANIONGAP 9 09/11/2022    ESTGFRAFRICA 106 05/11/2022    EGFRNONAA 92 05/11/2022       Lab Results   Component Value Date    WBC 7.4 01/26/2024    HGB 14.5 01/26/2024    HCT 43.0 01/26/2024    MCV 91.5 01/26/2024     01/26/2024       Imaging:  No pertinent recent imaging available.    ASSESSMENT     1. Calculus of urinary tract in diseases classified elsewhere    2. Hematuria, unspecified type      PLAN:     - CT Renal stone to assess for presence of ureteral stones  - If stones still present then we discussed need for ureteroscopy   - Urine sent for culture   - Empiric Bactrim DS x 7 days   - Flomax 0.4 mg  - Pyridium 100 mg TID PRN    Fatoumata Wolfe MD   "

## 2024-04-02 ENCOUNTER — TELEPHONE (OUTPATIENT)
Dept: FAMILY MEDICINE | Facility: CLINIC | Age: 54
End: 2024-04-02

## 2024-04-02 ENCOUNTER — OFFICE VISIT (OUTPATIENT)
Dept: FAMILY MEDICINE | Facility: CLINIC | Age: 54
End: 2024-04-02
Payer: MEDICAID

## 2024-04-02 VITALS
BODY MASS INDEX: 42.22 KG/M2 | OXYGEN SATURATION: 98 % | DIASTOLIC BLOOD PRESSURE: 88 MMHG | WEIGHT: 269 LBS | HEIGHT: 67 IN | SYSTOLIC BLOOD PRESSURE: 138 MMHG | HEART RATE: 95 BPM

## 2024-04-02 DIAGNOSIS — K21.9 GASTROESOPHAGEAL REFLUX DISEASE, UNSPECIFIED WHETHER ESOPHAGITIS PRESENT: ICD-10-CM

## 2024-04-02 DIAGNOSIS — I10 BENIGN ESSENTIAL HTN: ICD-10-CM

## 2024-04-02 DIAGNOSIS — M10.00 IDIOPATHIC GOUT, UNSPECIFIED CHRONICITY, UNSPECIFIED SITE: ICD-10-CM

## 2024-04-02 DIAGNOSIS — E11.9 DIABETIC EYE EXAM: Primary | ICD-10-CM

## 2024-04-02 DIAGNOSIS — Z01.00 DIABETIC EYE EXAM: Primary | ICD-10-CM

## 2024-04-02 DIAGNOSIS — R31.9 HEMATURIA, UNSPECIFIED TYPE: ICD-10-CM

## 2024-04-02 DIAGNOSIS — E78.5 HYPERLIPIDEMIA, UNSPECIFIED HYPERLIPIDEMIA TYPE: ICD-10-CM

## 2024-04-02 DIAGNOSIS — J30.2 SEASONAL ALLERGIES: ICD-10-CM

## 2024-04-02 DIAGNOSIS — E11.69 TYPE 2 DIABETES MELLITUS WITH OTHER SPECIFIED COMPLICATION, UNSPECIFIED WHETHER LONG TERM INSULIN USE: ICD-10-CM

## 2024-04-02 LAB — BACTERIA UR CULT: NO GROWTH

## 2024-04-02 PROCEDURE — 4010F ACE/ARB THERAPY RXD/TAKEN: CPT | Mod: CPTII,S$GLB,, | Performed by: NURSE PRACTITIONER

## 2024-04-02 PROCEDURE — 3079F DIAST BP 80-89 MM HG: CPT | Mod: CPTII,S$GLB,, | Performed by: NURSE PRACTITIONER

## 2024-04-02 PROCEDURE — 3044F HG A1C LEVEL LT 7.0%: CPT | Mod: CPTII,S$GLB,, | Performed by: NURSE PRACTITIONER

## 2024-04-02 PROCEDURE — 1160F RVW MEDS BY RX/DR IN RCRD: CPT | Mod: CPTII,S$GLB,, | Performed by: NURSE PRACTITIONER

## 2024-04-02 PROCEDURE — 99214 OFFICE O/P EST MOD 30 MIN: CPT | Mod: S$GLB,,, | Performed by: NURSE PRACTITIONER

## 2024-04-02 PROCEDURE — 1159F MED LIST DOCD IN RCRD: CPT | Mod: CPTII,S$GLB,, | Performed by: NURSE PRACTITIONER

## 2024-04-02 PROCEDURE — 3075F SYST BP GE 130 - 139MM HG: CPT | Mod: CPTII,S$GLB,, | Performed by: NURSE PRACTITIONER

## 2024-04-02 PROCEDURE — 3008F BODY MASS INDEX DOCD: CPT | Mod: CPTII,S$GLB,, | Performed by: NURSE PRACTITIONER

## 2024-04-02 RX ORDER — HYDROCHLOROTHIAZIDE 12.5 MG/1
12.5 TABLET ORAL
COMMUNITY

## 2024-04-02 RX ORDER — ONDANSETRON HYDROCHLORIDE 8 MG/1
8 TABLET, FILM COATED ORAL EVERY 8 HOURS PRN
COMMUNITY
Start: 2024-02-09

## 2024-04-02 RX ORDER — GANCICLOVIR 1.5 MG/G
1 GEL OPHTHALMIC
COMMUNITY
Start: 2024-01-23

## 2024-04-02 RX ORDER — ERYTHROMYCIN 5 MG/G
OINTMENT OPHTHALMIC
COMMUNITY
Start: 2023-11-25

## 2024-04-02 RX ORDER — TRAMADOL HYDROCHLORIDE 50 MG/1
50 TABLET ORAL EVERY 6 HOURS PRN
COMMUNITY
Start: 2024-02-09

## 2024-04-02 RX ORDER — HYOSCYAMINE SULFATE 0.12 MG/1
125 TABLET, ORALLY DISINTEGRATING ORAL EVERY 4 HOURS PRN
COMMUNITY
Start: 2024-02-09

## 2024-04-02 RX ORDER — ALLOPURINOL 300 MG/1
300 TABLET ORAL DAILY
Qty: 90 TABLET | Refills: 1 | Status: SHIPPED | OUTPATIENT
Start: 2024-04-02

## 2024-04-02 RX ORDER — FAMCICLOVIR 500 MG/1
500 TABLET ORAL
COMMUNITY
Start: 2024-01-10

## 2024-04-02 NOTE — TELEPHONE ENCOUNTER
----- Message from Idalia Hercules NP sent at 4/2/2024  1:54 PM CDT -----  Colonoscopy from dr. stephen

## 2024-04-02 NOTE — LETTER
[address] [facility] [address]  [place] [address]  Phone: [phone]   [phone]                        MD Jameel Templeton MD Chequita Williams, MD Matthew Bassett, PAJonoC     Stefani Lozano, VIKI Rivera, VIKI Hercules, VIKI Sen, PAMIRANDA      Date: 04/02/2024        Patient: Pierre Masters .  YOB: 1970      Please fax over pts most recent Colonoscopy       Sincerely,     Kylee Reinoso LPN

## 2024-04-02 NOTE — PROGRESS NOTES
SUBJECTIVE:    Patient ID: Pierre Masters Jr. is a 53 y.o. male.    Chief Complaint: Annual Exam (No bottles//Pt here for annual and med refill//discuss kidney stones. Saw dr yesterday. Still burning and bleeding//discuss cough medication//has colo with  In [place]//needs eye appt. Would like recommendation//JL)    53 year old male presents for check up. Patient is treated for htn, gout, hyperlipidemia, allergies, gerd,, oa and dm. bp in office is well controlled. . Trying to watch diet more.  A hga1c is 5.8mg/dl.  Still has daily back pain . Being followed by dr. Wolfe for kidney stone. Antibiotic call in today. Has been having cough x 1 week. No fever. Congested. Has tried otc meds with no relief.  Driving truck. Travels frequently.         Office Visit on 04/01/2024   Component Date Value Ref Range Status    Urine Culture, Routine 04/01/2024 No growth   Final    Color, POC UA 04/01/2024 Yellow  Yellow, Straw, Colorless Final    Clarity, POC UA 04/01/2024 Cloudy (A)  Clear Final    Glucose, POC UA 04/01/2024 Negative  Negative Final    Bilirubin, POC UA 04/01/2024 Negative  Negative Final    Ketones, POC UA 04/01/2024 Negative  Negative Final    Spec Grav POC UA 04/01/2024 1.020  1.005 - 1.030 Final    Blood, POC UA 04/01/2024 Large (A)  Negative Final    pH, POC UA 04/01/2024 7.0  5.0 - 8.0 Final    Protein, POC UA 04/01/2024 >=300 (A)  Negative Final    Urobilinogen, POC UA 04/01/2024 1.0  <=1.0 Final    Nitrite, POC UA 04/01/2024 Positive (A)  Negative Final    WBC, POC UA 04/01/2024 Large (A)  Negative Final   Telephone on 01/02/2024   Component Date Value Ref Range Status    Cholesterol 01/26/2024 157  <200 mg/dL Final    HDL 01/26/2024 28 (L)  > OR = 40 mg/dL Final    Triglycerides 01/26/2024 182 (H)  <150 mg/dL Final    LDL Cholesterol 01/26/2024 101 (H)  mg/dL (calc) Final    HDL/Cholesterol Ratio 01/26/2024 5.6 (H)  <5.0 (calc) Final    Non HDL Chol. (LDL+VLDL) 01/26/2024 129  <130 mg/dL  (calc) Final    Glucose 01/26/2024 124 (H)  65 - 99 mg/dL Final    BUN 01/26/2024 11  7 - 25 mg/dL Final    Creatinine 01/26/2024 1.12  0.70 - 1.30 mg/dL Final    eGFR 01/26/2024 79  > OR = 60 mL/min/1.73m2 Final    BUN/Creatinine Ratio 01/26/2024 SEE NOTE:  6 - 22 (calc) Final    Sodium 01/26/2024 140  135 - 146 mmol/L Final    Potassium 01/26/2024 3.7  3.5 - 5.3 mmol/L Final    Chloride 01/26/2024 103  98 - 110 mmol/L Final    CO2 01/26/2024 28  20 - 32 mmol/L Final    Calcium 01/26/2024 8.9  8.6 - 10.3 mg/dL Final    Total Protein 01/26/2024 6.3  6.1 - 8.1 g/dL Final    Albumin 01/26/2024 3.9  3.6 - 5.1 g/dL Final    Globulin, Total 01/26/2024 2.4  1.9 - 3.7 g/dL (calc) Final    Albumin/Globulin Ratio 01/26/2024 1.6  1.0 - 2.5 (calc) Final    Total Bilirubin 01/26/2024 0.8  0.2 - 1.2 mg/dL Final    Alkaline Phosphatase 01/26/2024 75  35 - 144 U/L Final    AST 01/26/2024 22  10 - 35 U/L Final    ALT 01/26/2024 19  9 - 46 U/L Final    WBC 01/26/2024 7.4  3.8 - 10.8 Thousand/uL Final    RBC 01/26/2024 4.70  4.20 - 5.80 Million/uL Final    Hemoglobin 01/26/2024 14.5  13.2 - 17.1 g/dL Final    Hematocrit 01/26/2024 43.0  38.5 - 50.0 % Final    MCV 01/26/2024 91.5  80.0 - 100.0 fL Final    MCH 01/26/2024 30.9  27.0 - 33.0 pg Final    MCHC 01/26/2024 33.7  32.0 - 36.0 g/dL Final    RDW 01/26/2024 12.3  11.0 - 15.0 % Final    Platelets 01/26/2024 276  140 - 400 Thousand/uL Final    MPV 01/26/2024 11.2  7.5 - 12.5 fL Final    Neutrophils, Abs 01/26/2024 3,515  1,500 - 7,800 cells/uL Final    Lymph # 01/26/2024 2,945  850 - 3,900 cells/uL Final    Mono # 01/26/2024 599  200 - 950 cells/uL Final    Eos # 01/26/2024 252  15 - 500 cells/uL Final    Baso # 01/26/2024 89  0 - 200 cells/uL Final    Neutrophils Relative 01/26/2024 47.5  % Final    Lymph % 01/26/2024 39.8  % Final    Mono % 01/26/2024 8.1  % Final    Eosinophil % 01/26/2024 3.4  % Final    Basophil % 01/26/2024 1.2  % Final    TSH w/reflex to FT4 01/26/2024 1.09   0.40 - 4.50 mIU/L Final    Hemoglobin A1C 01/26/2024 6.0 (H)  <5.7 % of total Hgb Final       Past Medical History:   Diagnosis Date    Allergy     Asthma     Diabetes mellitus     Diabetes mellitus, type 2     GERD (gastroesophageal reflux disease)     Gout     Gout     Hyperlipidemia     Hypertension     Lupus     Sleep apnea     Sleep apnea      Social History     Socioeconomic History    Marital status:    Tobacco Use    Smoking status: Heavy Smoker     Current packs/day: 1.00     Types: Cigarettes    Smokeless tobacco: Never   Substance and Sexual Activity    Alcohol use: No    Drug use: No   Social History Narrative    Always in pain does not sleep well    No exercise     Social Determinants of Health     Food Insecurity: No Food Insecurity (2/8/2024)    Received from [facility]    Hunger Vital Sign     Worried About Running Out of Food in the Last Year: Never true     Ran Out of Food in the Last Year: Never true   Transportation Needs: No Transportation Needs (2/8/2024)    Received from [facility]    PRAPARE - Transportation     Lack of Transportation (Medical): No     Lack of Transportation (Non-Medical): No   Housing Stability: Low Risk  (2/8/2024)    Received from [facility]    Housing Stability Vital Sign     Unable to Pay for Housing in the Last Year: No     Number of Places Lived in the Last Year: 1     In the last 12 months, was there a time when you did not have a steady place to sleep or slept in a shelter (including now)?: No     Past Surgical History:   Procedure Laterality Date    BACK SURGERY      LIPOMA RESECTION      ROTATOR CUFF REPAIR Left      Family History   Problem Relation Name Age of Onset    Heart disease Mother      Diabetes Father      Hypertension Father      Cancer Father      Atrial fibrillation Brother      Heart disease Brother         Tests to Keep You Healthy    Eye Exam: ORDERED BUT NOT SCHEDULED  Colon Cancer Screening: Met on 5/9/2023  Last Blood Pressure <= 139/89  (4/2/2024): Yes  Last HbA1c < 8 (02/08/2024): Yes  Tobacco Cessation: NO      Review of patient's allergies indicates:  No Known Allergies    Current Outpatient Medications:     erythromycin (ROMYCIN) ophthalmic ointment, Place into both eyes., Disp: , Rfl:     famciclovir (FAMVIR) 500 MG tablet, Take 500 mg by mouth., Disp: , Rfl:     hyoscyamine (LEVSIN) 0.125 mg TbDL tablet, Take 125 mcg by mouth every 4 (four) hours as needed., Disp: , Rfl:     ondansetron (ZOFRAN) 8 MG tablet, Take 8 mg by mouth every 8 (eight) hours as needed., Disp: , Rfl:     traMADoL (ULTRAM) 50 mg tablet, Take 50 mg by mouth every 6 (six) hours as needed., Disp: , Rfl:     ZIRGAN 0.15 % Gel, Apply 1 drop to eye., Disp: , Rfl:     albuterol (PROVENTIL) 5 mg/mL nebulizer solution, Take 2.5 mg by nebulization every 6 (six) hours as needed for Wheezing. Rescue, Disp: , Rfl:     albuterol (PROVENTIL/VENTOLIN HFA) 90 mcg/actuation inhaler, Inhale 2 puffs into the lungs every 6 (six) hours as needed for Wheezing. Rescue, Disp: 18 g, Rfl: 2    allopurinoL (ZYLOPRIM) 300 MG tablet, Take 1 tablet (300 mg total) by mouth once daily., Disp: 90 tablet, Rfl: 1    amLODIPine (NORVASC) 5 MG tablet, Take 1 tablet (5 mg total) by mouth once daily., Disp: 30 tablet, Rfl: 2    aspirin 81 MG Chew, Take 81 mg by mouth., Disp: , Rfl:     atorvastatin (LIPITOR) 10 MG tablet, Take 1 tablet (10 mg total) by mouth once daily., Disp: 30 tablet, Rfl: 2    azelastine (ASTELIN) 137 mcg (0.1 %) nasal spray, 1 spray (137 mcg total) by Nasal route 2 (two) times daily., Disp: 30 mL, Rfl: 2    cetirizine (ZYRTEC) 10 MG tablet, Take 1 tablet (10 mg total) by mouth once daily., Disp: 30 tablet, Rfl: 2    chlorthalidone (HYGROTEN) 25 MG Tab, Take 1 tablet (25 mg total) by mouth once daily., Disp: 30 tablet, Rfl: 2    clotrimazole-betamethasone 1-0.05% (LOTRISONE) cream, Apply topically 2 (two) times daily., Disp: 45 g, Rfl: 1    colchicine (COLCRYS) 0.6 mg tablet, TAKE 2  TABLETS AT ONSET OF symptoms AND TAKE 1 TABLET 1 HOUR LATER, Disp: 30 tablet, Rfl: 2    diclofenac (VOLTAREN) 50 MG EC tablet, Take 1 tablet (50 mg total) by mouth 2 (two) times daily as needed (pain)., Disp: 20 tablet, Rfl: 0    fluticasone propionate (FLONASE) 50 mcg/actuation nasal spray, SPRAY TWICE INTO EACH NOSTRIL ONCE A DAY AS NEEDED, Disp: 16 g, Rfl: 2    hydroCHLOROthiazide (HYDRODIURIL) 12.5 MG Tab, Take 12.5 mg by mouth., Disp: , Rfl:     indomethacin (INDOCIN) 50 MG capsule, Take 50 mg by mouth 3 (three) times daily., Disp: , Rfl:     LINZESS 145 mcg Cap capsule, Take 145 mcg by mouth every morning., Disp: , Rfl:     lisinopriL 10 MG tablet, Take 10 mg by mouth., Disp: , Rfl:     losartan (COZAAR) 25 MG tablet, Take 1 tablet (25 mg total) by mouth once daily., Disp: 30 tablet, Rfl: 2    meloxicam (MOBIC) 15 MG tablet, Take 1 tablet (15 mg total) by mouth once daily., Disp: 30 tablet, Rfl: 2    metFORMIN (GLUCOPHAGE) 1000 MG tablet, Take 1,000 mg by mouth 2 (two) times daily., Disp: , Rfl:     omeprazole (PRILOSEC) 40 MG capsule, Take 1 capsule (40 mg total) by mouth once daily., Disp: 30 capsule, Rfl: 2    pulse oximeter (PULSE OXIMETER) device, by Apply Externally route 2 (two) times a day. Use twice daily at 8 AM and 3 PM and [facility]., Disp: 1 each, Rfl: 0    sildenafiL (VIAGRA) 25 MG tablet, Take 1 tablet (25 mg total) by mouth daily as needed for Erectile Dysfunction. (Patient not taking: No sig reported), Disp: 30 tablet, Rfl: 2    tamsulosin (FLOMAX) 0.4 mg Cap, Take 1 capsule (0.4 mg total) by mouth once daily., Disp: 30 capsule, Rfl: 11    triamcinolone acetonide 0.1% (KENALOG) 0.1 % ointment, Apply to affected areas twice a day, Disp: 80 g, Rfl: 1    Review of Systems   Constitutional:  Negative for fatigue, fever and unexpected weight change.   HENT:  Negative for ear pain, sinus pressure and sore throat.    Eyes:  Negative for pain.   Respiratory:  Positive for  "cough. Negative for shortness of breath.    Cardiovascular:  Negative for chest pain and leg swelling.   Gastrointestinal:  Negative for abdominal pain, constipation, nausea and vomiting.   Genitourinary:  Negative for dysuria, frequency and urgency.   Musculoskeletal:  Negative for arthralgias.   Skin:  Negative for rash.   Neurological:  Negative for dizziness, weakness and headaches.   Psychiatric/Behavioral:  Negative for sleep disturbance.           Objective:      Vitals:    04/02/24 1201 04/02/24 1207 04/02/24 1300   BP: (!) 168/90 (!) 142/90 138/88   Pulse: 95     SpO2: 98%     Weight: 122 kg (269 lb)     Height: 5' 7" (1.702 m)       Physical Exam  Vitals and nursing note reviewed.   Constitutional:       General: He is not in acute distress.     Appearance: Normal appearance. He is well-developed. He is obese. He is not ill-appearing.   HENT:      Head: Normocephalic and atraumatic.      Right Ear: External ear normal.      Left Ear: External ear normal.      Nose: Congestion and rhinorrhea present.   Eyes:      Pupils: Pupils are equal, round, and reactive to light.   Cardiovascular:      Rate and Rhythm: Normal rate and regular rhythm.      Pulses:           Dorsalis pedis pulses are 2+ on the right side.        Posterior tibial pulses are 2+ on the right side.      Heart sounds: Normal heart sounds.   Pulmonary:      Effort: Pulmonary effort is normal.      Breath sounds: Normal breath sounds.   Abdominal:      General: Bowel sounds are normal.      Palpations: Abdomen is soft.   Musculoskeletal:         General: No deformity. Normal range of motion.      Cervical back: Normal range of motion and neck supple.      Right foot: Normal range of motion. No deformity.   Feet:      Right foot:      Protective Sensation: 5 sites tested.  5 sites sensed.      Left foot:      Protective Sensation: 5 sites tested.  5 sites sensed.      Skin integrity: No skin breakdown.   Lymphadenopathy:      Cervical: No " cervical adenopathy.   Skin:     General: Skin is warm and dry.   Neurological:      Mental Status: He is alert and oriented to person, place, and time.   Psychiatric:         Behavior: Behavior normal.           Assessment:       1. Diabetic eye exam    2. Type 2 diabetes mellitus with other specified complication, unspecified whether long term insulin use    3. Hyperlipidemia, unspecified hyperlipidemia type    4. Benign essential HTN    5. Gastroesophageal reflux disease, unspecified whether esophagitis present    6. Idiopathic gout, unspecified chronicity, unspecified site    7. Hematuria, unspecified type    8. Seasonal allergies         Plan:       Diabetic eye exam  -     Ambulatory referral/consult to Ophthalmology; Future; Expected date: 04/16/2024    Type 2 diabetes mellitus with other specified complication, unspecified whether long term insulin use  Comments:  well controlled    Hyperlipidemia, unspecified hyperlipidemia type  Comments:  lipitor    Benign essential HTN  Comments:  bp controlled    Gastroesophageal reflux disease, unspecified whether esophagitis present  Comments:  prilosec    Idiopathic gout, unspecified chronicity, unspecified site  Comments:  allopurinol    Hematuria, unspecified type  Comments:  being followed by dr. brink    Seasonal allergies  Comments:  promethazine    Other orders  -     allopurinoL (ZYLOPRIM) 300 MG tablet; Take 1 tablet (300 mg total) by mouth once daily.  Dispense: 90 tablet; Refill: 1      Follow up in about 3 months (around 7/2/2024), or if symptoms worsen or fail to improve, for medication management.        4/15/2024 Idalia Hercules

## 2024-04-03 ENCOUNTER — HOSPITAL ENCOUNTER (OUTPATIENT)
Dept: RADIOLOGY | Facility: HOSPITAL | Age: 54
Discharge: HOME | End: 2024-04-03
Attending: STUDENT IN AN ORGANIZED HEALTH CARE EDUCATION/TRAINING PROGRAM
Payer: COMMERCIAL

## 2024-04-03 ENCOUNTER — TELEPHONE (OUTPATIENT)
Dept: FAMILY MEDICINE | Facility: CLINIC | Age: 54
End: 2024-04-03
Payer: COMMERCIAL

## 2024-04-03 DIAGNOSIS — N22 CALCULUS OF URINARY TRACT IN DISEASES CLASSIFIED ELSEWHERE: ICD-10-CM

## 2024-04-03 DIAGNOSIS — E11.69 TYPE 2 DIABETES MELLITUS WITH OTHER SPECIFIED COMPLICATION, UNSPECIFIED WHETHER LONG TERM INSULIN USE: ICD-10-CM

## 2024-04-03 DIAGNOSIS — M54.12 CERVICAL RADICULOPATHY: ICD-10-CM

## 2024-04-03 DIAGNOSIS — J30.2 SEASONAL ALLERGIES: ICD-10-CM

## 2024-04-03 DIAGNOSIS — I10 BENIGN ESSENTIAL HTN: ICD-10-CM

## 2024-04-03 DIAGNOSIS — E78.5 HYPERLIPIDEMIA, UNSPECIFIED HYPERLIPIDEMIA TYPE: ICD-10-CM

## 2024-04-03 DIAGNOSIS — G89.29 CHRONIC RIGHT SHOULDER PAIN: ICD-10-CM

## 2024-04-03 DIAGNOSIS — B37.2 YEAST DERMATITIS: ICD-10-CM

## 2024-04-03 DIAGNOSIS — Z12.5 SCREENING FOR PROSTATE CANCER: Primary | ICD-10-CM

## 2024-04-03 DIAGNOSIS — N22 CALCULUS OF URINARY TRACT IN DISEASES CLASSIFIED ELSEWHERE: Primary | ICD-10-CM

## 2024-04-03 DIAGNOSIS — K21.9 GASTROESOPHAGEAL REFLUX DISEASE, UNSPECIFIED WHETHER ESOPHAGITIS PRESENT: ICD-10-CM

## 2024-04-03 DIAGNOSIS — M25.511 CHRONIC RIGHT SHOULDER PAIN: ICD-10-CM

## 2024-04-03 DIAGNOSIS — R60.9 EDEMA, UNSPECIFIED TYPE: ICD-10-CM

## 2024-04-03 PROCEDURE — 74176 CT ABD & PELVIS W/O CONTRAST: CPT | Mod: TC,PO

## 2024-04-03 RX ORDER — AZELASTINE 1 MG/ML
1 SPRAY, METERED NASAL 2 TIMES DAILY
Qty: 30 ML | Refills: 2 | Status: SHIPPED | OUTPATIENT
Start: 2024-04-03

## 2024-04-03 RX ORDER — COLCHICINE 0.6 MG/1
TABLET ORAL
Qty: 30 TABLET | Refills: 2 | Status: SHIPPED | OUTPATIENT
Start: 2024-04-03

## 2024-04-03 RX ORDER — PROMETHAZINE HYDROCHLORIDE AND DEXTROMETHORPHAN HYDROBROMIDE 6.25; 15 MG/5ML; MG/5ML
5 SYRUP ORAL EVERY 6 HOURS PRN
Qty: 240 ML | Refills: 0 | Status: SHIPPED | OUTPATIENT
Start: 2024-04-03 | End: 2024-04-13

## 2024-04-03 RX ORDER — ATORVASTATIN CALCIUM 10 MG/1
10 TABLET, FILM COATED ORAL DAILY
Qty: 30 TABLET | Refills: 2 | Status: SHIPPED | OUTPATIENT
Start: 2024-04-03 | End: 2025-04-03

## 2024-04-03 RX ORDER — AMLODIPINE BESYLATE 5 MG/1
5 TABLET ORAL DAILY
Qty: 30 TABLET | Refills: 2 | Status: SHIPPED | OUTPATIENT
Start: 2024-04-03 | End: 2025-04-03

## 2024-04-03 RX ORDER — CHLORTHALIDONE 25 MG/1
25 TABLET ORAL DAILY
Qty: 30 TABLET | Refills: 2 | Status: SHIPPED | OUTPATIENT
Start: 2024-04-03 | End: 2025-04-03

## 2024-04-03 RX ORDER — CETIRIZINE HYDROCHLORIDE 10 MG/1
10 TABLET ORAL DAILY
Qty: 30 TABLET | Refills: 2 | Status: SHIPPED | OUTPATIENT
Start: 2024-04-03

## 2024-04-03 RX ORDER — MELOXICAM 15 MG/1
15 TABLET ORAL DAILY
Qty: 30 TABLET | Refills: 2 | Status: SHIPPED | OUTPATIENT
Start: 2024-04-03

## 2024-04-03 RX ORDER — LOSARTAN POTASSIUM 25 MG/1
25 TABLET ORAL DAILY
Qty: 30 TABLET | Refills: 2 | Status: SHIPPED | OUTPATIENT
Start: 2024-04-03

## 2024-04-03 RX ORDER — CEFAZOLIN SODIUM 2 G/50ML
2 SOLUTION INTRAVENOUS
Status: CANCELLED | OUTPATIENT
Start: 2024-04-03

## 2024-04-03 RX ORDER — OMEPRAZOLE 40 MG/1
40 CAPSULE, DELAYED RELEASE ORAL DAILY
Qty: 30 CAPSULE | Refills: 2 | Status: SHIPPED | OUTPATIENT
Start: 2024-04-03

## 2024-04-03 RX ORDER — ALBUTEROL SULFATE 90 UG/1
2 AEROSOL, METERED RESPIRATORY (INHALATION) EVERY 6 HOURS PRN
Qty: 18 G | Refills: 2 | Status: SHIPPED | OUTPATIENT
Start: 2024-04-03

## 2024-04-03 NOTE — TELEPHONE ENCOUNTER
----- Message from [facility], RT sent at 1/2/2024  8:52 AM CST -----  Regarding: PSA due  1/2/2024 Pt here for labs. Annual orders entered. Remind me for PSA in April

## 2024-04-03 NOTE — PROGRESS NOTES
Procedure Order to Urology [[id number]]     Status: Active   Order placed as a reflex to CT Renal Stone Study ABD Pelvis WO [[id number]] ordered on 04/01/24 at 1004   Ordering user: Fatoumata Wolfe MD 04/03/24 1549 Ordering provider: Fatoumata Wolfe MD   Authorized by: Fatoumata Wolfe MD Ordering mode: Standard   Frequency:  04/03/24 -     Diagnoses  Calculus of urinary tract in diseases classified elsewhere [N22]   Questionnaire    Question Answer   Procedure Ureteroscopy Stone Extraction (Remove Calculus Ureter) Comment - 4/19, bilateral   Facility Name: [facility]   Please order out patient hospital.CBC, BMP, U/A and culture , EKG over 40, Start IV,NPO, General sedation ,  Ancef 2gram ( alternative for pcn allergy cipro 400mg IV ) cpt- 33695

## 2024-04-03 NOTE — TELEPHONE ENCOUNTER
----- Message from Helen Willams sent at 4/3/2024 10:26 AM CDT -----  Pt is calling to get all his meds called in as he is going out of town tomorrow   [facility]   [phone]

## 2024-04-09 ENCOUNTER — PATIENT OUTREACH (OUTPATIENT)
Dept: ADMINISTRATIVE | Facility: HOSPITAL | Age: 54
End: 2024-04-09
Payer: COMMERCIAL

## 2024-04-09 ENCOUNTER — PATIENT MESSAGE (OUTPATIENT)
Dept: FAMILY MEDICINE | Facility: CLINIC | Age: 54
End: 2024-04-09
Payer: COMMERCIAL

## 2024-04-09 DIAGNOSIS — Z12.11 SCREENING FOR COLON CANCER: ICD-10-CM

## 2024-04-09 DIAGNOSIS — E11.69 TYPE 2 DIABETES MELLITUS WITH OTHER SPECIFIED COMPLICATION, UNSPECIFIED WHETHER LONG TERM INSULIN USE: Primary | ICD-10-CM

## 2024-04-10 ENCOUNTER — TELEPHONE (OUTPATIENT)
Dept: OPTOMETRY | Facility: CLINIC | Age: 54
End: 2024-04-10
Payer: COMMERCIAL

## 2024-04-17 ENCOUNTER — TELEPHONE (OUTPATIENT)
Dept: FAMILY MEDICINE | Facility: CLINIC | Age: 54
End: 2024-04-17
Payer: COMMERCIAL

## 2024-04-17 NOTE — TELEPHONE ENCOUNTER
----- Message from Vandana Go LPN sent at 4/17/2024  7:52 AM CDT -----  Regarding: FW: PSA due    ----- Message -----  From: Opal aCmpbell RT  Sent: 4/2/2024  12:00 AM CDT  To: RT Lalito  Subject: PSA due                                          1/2/2024 Pt here for labs. Annual orders entered. Remind me for PSA in April

## 2024-04-18 ENCOUNTER — ANESTHESIA EVENT (OUTPATIENT)
Dept: SURGERY | Facility: HOSPITAL | Age: 54
End: 2024-04-18
Payer: COMMERCIAL

## 2024-04-19 ENCOUNTER — HOSPITAL ENCOUNTER (OUTPATIENT)
Facility: HOSPITAL | Age: 54
Discharge: HOME | End: 2024-04-19
Attending: STUDENT IN AN ORGANIZED HEALTH CARE EDUCATION/TRAINING PROGRAM | Admitting: STUDENT IN AN ORGANIZED HEALTH CARE EDUCATION/TRAINING PROGRAM
Payer: COMMERCIAL

## 2024-04-19 ENCOUNTER — ANESTHESIA (OUTPATIENT)
Dept: SURGERY | Facility: HOSPITAL | Age: 54
End: 2024-04-19
Payer: COMMERCIAL

## 2024-04-19 DIAGNOSIS — N22 CALCULUS OF URINARY TRACT IN DISEASES CLASSIFIED ELSEWHERE: Primary | ICD-10-CM

## 2024-04-19 DIAGNOSIS — Z01.818 PRE-OP TESTING: ICD-10-CM

## 2024-04-19 LAB
ANION GAP SERPL CALC-SCNC: 10 MMOL/L (ref 8–16)
BASOPHILS # BLD AUTO: 0.09 K/UL (ref 0–0.2)
BASOPHILS NFR BLD: 1.5 % (ref 0–1.9)
BUN SERPL-MCNC: 16 MG/DL (ref 6–20)
CALCIUM SERPL-MCNC: 9.1 MG/DL (ref 8.7–10.5)
CHLORIDE SERPL-SCNC: 109 MMOL/L (ref 95–110)
CO2 SERPL-SCNC: 23 MMOL/L (ref 23–29)
CREAT SERPL-MCNC: 1.2 MG/DL (ref 0.5–1.4)
DIFFERENTIAL METHOD BLD: ABNORMAL
EOSINOPHIL # BLD AUTO: 0.3 K/UL (ref 0–0.5)
EOSINOPHIL NFR BLD: 5 % (ref 0–8)
ERYTHROCYTE [DISTWIDTH] IN BLOOD BY AUTOMATED COUNT: 12.9 % (ref 11.5–14.5)
EST. GFR  (NO RACE VARIABLE): >60 ML/MIN/1.73 M^2
GLUCOSE SERPL-MCNC: 105 MG/DL (ref 70–110)
HCT VFR BLD AUTO: 36.1 % (ref 40–54)
HGB BLD-MCNC: 11.9 G/DL (ref 14–18)
IMM GRANULOCYTES # BLD AUTO: 0.02 K/UL (ref 0–0.04)
IMM GRANULOCYTES NFR BLD AUTO: 0.3 % (ref 0–0.5)
LYMPHOCYTES # BLD AUTO: 2.3 K/UL (ref 1–4.8)
LYMPHOCYTES NFR BLD: 37.5 % (ref 18–48)
MCH RBC QN AUTO: 30.4 PG (ref 27–31)
MCHC RBC AUTO-ENTMCNC: 33 G/DL (ref 32–36)
MCV RBC AUTO: 92 FL (ref 82–98)
MONOCYTES # BLD AUTO: 0.6 K/UL (ref 0.3–1)
MONOCYTES NFR BLD: 10 % (ref 4–15)
NEUTROPHILS # BLD AUTO: 2.7 K/UL (ref 1.8–7.7)
NEUTROPHILS NFR BLD: 45.7 % (ref 38–73)
NRBC BLD-RTO: 0 /100 WBC
PLATELET # BLD AUTO: 264 K/UL (ref 150–450)
PMV BLD AUTO: 11 FL (ref 9.2–12.9)
POTASSIUM SERPL-SCNC: 3.9 MMOL/L (ref 3.5–5.1)
RBC # BLD AUTO: 3.92 M/UL (ref 4.6–6.2)
SODIUM SERPL-SCNC: 142 MMOL/L (ref 136–145)
WBC # BLD AUTO: 6 K/UL (ref 3.9–12.7)

## 2024-04-19 PROCEDURE — 52352 CYSTOURETERO W/STONE REMOVE: CPT | Mod: 59,RT,, | Performed by: STUDENT IN AN ORGANIZED HEALTH CARE EDUCATION/TRAINING PROGRAM

## 2024-04-19 PROCEDURE — 25000003 PHARM REV CODE 250: Performed by: STUDENT IN AN ORGANIZED HEALTH CARE EDUCATION/TRAINING PROGRAM

## 2024-04-19 PROCEDURE — 27201423 OPTIME MED/SURG SUP & DEVICES STERILE SUPPLY: Performed by: STUDENT IN AN ORGANIZED HEALTH CARE EDUCATION/TRAINING PROGRAM

## 2024-04-19 PROCEDURE — 63600175 PHARM REV CODE 636 W HCPCS: Performed by: STUDENT IN AN ORGANIZED HEALTH CARE EDUCATION/TRAINING PROGRAM

## 2024-04-19 PROCEDURE — 82365 CALCULUS SPECTROSCOPY: CPT | Performed by: STUDENT IN AN ORGANIZED HEALTH CARE EDUCATION/TRAINING PROGRAM

## 2024-04-19 PROCEDURE — 52332 CYSTOSCOPY AND TREATMENT: CPT | Mod: 59,RT,, | Performed by: STUDENT IN AN ORGANIZED HEALTH CARE EDUCATION/TRAINING PROGRAM

## 2024-04-19 PROCEDURE — 63600175 PHARM REV CODE 636 W HCPCS: Performed by: ANESTHESIOLOGY

## 2024-04-19 PROCEDURE — 85025 COMPLETE CBC W/AUTO DIFF WBC: CPT | Performed by: STUDENT IN AN ORGANIZED HEALTH CARE EDUCATION/TRAINING PROGRAM

## 2024-04-19 PROCEDURE — 80048 BASIC METABOLIC PNL TOTAL CA: CPT | Performed by: STUDENT IN AN ORGANIZED HEALTH CARE EDUCATION/TRAINING PROGRAM

## 2024-04-19 PROCEDURE — D9220A PRA ANESTHESIA: Mod: ANES,,, | Performed by: ANESTHESIOLOGY

## 2024-04-19 PROCEDURE — 25000003 PHARM REV CODE 250: Performed by: ANESTHESIOLOGY

## 2024-04-19 PROCEDURE — 93005 ELECTROCARDIOGRAM TRACING: CPT

## 2024-04-19 PROCEDURE — 71000039 HC RECOVERY, EACH ADD'L HOUR: Performed by: STUDENT IN AN ORGANIZED HEALTH CARE EDUCATION/TRAINING PROGRAM

## 2024-04-19 PROCEDURE — C2617 STENT, NON-COR, TEM W/O DEL: HCPCS | Performed by: STUDENT IN AN ORGANIZED HEALTH CARE EDUCATION/TRAINING PROGRAM

## 2024-04-19 PROCEDURE — 36000707: Performed by: STUDENT IN AN ORGANIZED HEALTH CARE EDUCATION/TRAINING PROGRAM

## 2024-04-19 PROCEDURE — 74420 UROGRAPHY RTRGR +-KUB: CPT | Mod: 26,,, | Performed by: STUDENT IN AN ORGANIZED HEALTH CARE EDUCATION/TRAINING PROGRAM

## 2024-04-19 PROCEDURE — D9220A PRA ANESTHESIA: Mod: CRNA,,, | Performed by: STUDENT IN AN ORGANIZED HEALTH CARE EDUCATION/TRAINING PROGRAM

## 2024-04-19 PROCEDURE — C1769 GUIDE WIRE: HCPCS | Performed by: STUDENT IN AN ORGANIZED HEALTH CARE EDUCATION/TRAINING PROGRAM

## 2024-04-19 PROCEDURE — 71000015 HC POSTOP RECOV 1ST HR: Performed by: STUDENT IN AN ORGANIZED HEALTH CARE EDUCATION/TRAINING PROGRAM

## 2024-04-19 PROCEDURE — 37000008 HC ANESTHESIA 1ST 15 MINUTES: Performed by: STUDENT IN AN ORGANIZED HEALTH CARE EDUCATION/TRAINING PROGRAM

## 2024-04-19 PROCEDURE — 94799 UNLISTED PULMONARY SVC/PX: CPT | Mod: XB

## 2024-04-19 PROCEDURE — 71000033 HC RECOVERY, INTIAL HOUR: Performed by: STUDENT IN AN ORGANIZED HEALTH CARE EDUCATION/TRAINING PROGRAM

## 2024-04-19 PROCEDURE — 52356 CYSTO/URETERO W/LITHOTRIPSY: CPT | Mod: LT,,, | Performed by: STUDENT IN AN ORGANIZED HEALTH CARE EDUCATION/TRAINING PROGRAM

## 2024-04-19 PROCEDURE — 37000009 HC ANESTHESIA EA ADD 15 MINS: Performed by: STUDENT IN AN ORGANIZED HEALTH CARE EDUCATION/TRAINING PROGRAM

## 2024-04-19 PROCEDURE — 27200651 HC AIRWAY, LMA: Performed by: ANESTHESIOLOGY

## 2024-04-19 PROCEDURE — 36000706: Performed by: STUDENT IN AN ORGANIZED HEALTH CARE EDUCATION/TRAINING PROGRAM

## 2024-04-19 PROCEDURE — 36415 COLL VENOUS BLD VENIPUNCTURE: CPT | Performed by: STUDENT IN AN ORGANIZED HEALTH CARE EDUCATION/TRAINING PROGRAM

## 2024-04-19 PROCEDURE — 93010 ELECTROCARDIOGRAM REPORT: CPT | Mod: ,,, | Performed by: INTERNAL MEDICINE

## 2024-04-19 PROCEDURE — 25500020 PHARM REV CODE 255: Performed by: STUDENT IN AN ORGANIZED HEALTH CARE EDUCATION/TRAINING PROGRAM

## 2024-04-19 DEVICE — STENT SET URETERAL 6X26CM: Type: IMPLANTABLE DEVICE | Site: URETER | Status: FUNCTIONAL

## 2024-04-19 DEVICE — STENT SET URETERAL 6X28CM: Type: IMPLANTABLE DEVICE | Site: URETER | Status: FUNCTIONAL

## 2024-04-19 RX ORDER — HYDROCODONE BITARTRATE AND ACETAMINOPHEN 5; 325 MG/1; MG/1
1 TABLET ORAL EVERY 4 HOURS PRN
Status: DISCONTINUED | OUTPATIENT
Start: 2024-04-19 | End: 2024-04-19 | Stop reason: HOSPADM

## 2024-04-19 RX ORDER — OXYCODONE HYDROCHLORIDE 5 MG/1
5 TABLET ORAL
Status: DISCONTINUED | OUTPATIENT
Start: 2024-04-19 | End: 2024-04-19 | Stop reason: HOSPADM

## 2024-04-19 RX ORDER — PHENAZOPYRIDINE HYDROCHLORIDE 100 MG/1
100 TABLET, FILM COATED ORAL 3 TIMES DAILY PRN
Qty: 30 TABLET | Refills: 0 | Status: SHIPPED | OUTPATIENT
Start: 2024-04-19 | End: 2024-04-29

## 2024-04-19 RX ORDER — ONDANSETRON HYDROCHLORIDE 2 MG/ML
4 INJECTION, SOLUTION INTRAVENOUS ONCE AS NEEDED
Status: DISCONTINUED | OUTPATIENT
Start: 2024-04-19 | End: 2024-04-19 | Stop reason: HOSPADM

## 2024-04-19 RX ORDER — MEPERIDINE HYDROCHLORIDE 50 MG/ML
12.5 INJECTION INTRAMUSCULAR; INTRAVENOUS; SUBCUTANEOUS ONCE AS NEEDED
Status: DISCONTINUED | OUTPATIENT
Start: 2024-04-19 | End: 2024-04-19 | Stop reason: HOSPADM

## 2024-04-19 RX ORDER — ONDANSETRON 4 MG/1
8 TABLET, ORALLY DISINTEGRATING ORAL EVERY 8 HOURS PRN
Status: DISCONTINUED | OUTPATIENT
Start: 2024-04-19 | End: 2024-04-19 | Stop reason: HOSPADM

## 2024-04-19 RX ORDER — LIDOCAINE HYDROCHLORIDE 10 MG/ML
1 INJECTION, SOLUTION EPIDURAL; INFILTRATION; INTRACAUDAL; PERINEURAL ONCE
Status: COMPLETED | OUTPATIENT
Start: 2024-04-19 | End: 2024-04-19

## 2024-04-19 RX ORDER — MIDAZOLAM HYDROCHLORIDE 1 MG/ML
INJECTION INTRAMUSCULAR; INTRAVENOUS
Status: DISCONTINUED | OUTPATIENT
Start: 2024-04-19 | End: 2024-04-19

## 2024-04-19 RX ORDER — KETOROLAC TROMETHAMINE 30 MG/ML
INJECTION, SOLUTION INTRAMUSCULAR; INTRAVENOUS
Status: DISCONTINUED | OUTPATIENT
Start: 2024-04-19 | End: 2024-04-19

## 2024-04-19 RX ORDER — FENTANYL CITRATE 50 UG/ML
25 INJECTION, SOLUTION INTRAMUSCULAR; INTRAVENOUS EVERY 5 MIN PRN
Status: DISCONTINUED | OUTPATIENT
Start: 2024-04-19 | End: 2024-04-19 | Stop reason: HOSPADM

## 2024-04-19 RX ORDER — FENTANYL CITRATE 50 UG/ML
INJECTION, SOLUTION INTRAMUSCULAR; INTRAVENOUS
Status: DISCONTINUED | OUTPATIENT
Start: 2024-04-19 | End: 2024-04-19

## 2024-04-19 RX ORDER — EPHEDRINE SULFATE 50 MG/ML
INJECTION, SOLUTION INTRAVENOUS
Status: DISCONTINUED | OUTPATIENT
Start: 2024-04-19 | End: 2024-04-19

## 2024-04-19 RX ORDER — LIDOCAINE HYDROCHLORIDE 20 MG/ML
INJECTION INTRAVENOUS
Status: DISCONTINUED | OUTPATIENT
Start: 2024-04-19 | End: 2024-04-19

## 2024-04-19 RX ORDER — DEXAMETHASONE SODIUM PHOSPHATE 4 MG/ML
INJECTION, SOLUTION INTRA-ARTICULAR; INTRALESIONAL; INTRAMUSCULAR; INTRAVENOUS; SOFT TISSUE
Status: DISCONTINUED | OUTPATIENT
Start: 2024-04-19 | End: 2024-04-19

## 2024-04-19 RX ORDER — OXYCODONE AND ACETAMINOPHEN 5; 325 MG/1; MG/1
1 TABLET ORAL EVERY 4 HOURS PRN
Qty: 10 TABLET | Refills: 0 | Status: SHIPPED | OUTPATIENT
Start: 2024-04-19

## 2024-04-19 RX ORDER — ONDANSETRON HYDROCHLORIDE 2 MG/ML
INJECTION, SOLUTION INTRAVENOUS
Status: DISCONTINUED | OUTPATIENT
Start: 2024-04-19 | End: 2024-04-19

## 2024-04-19 RX ORDER — ACETAMINOPHEN 10 MG/ML
INJECTION, SOLUTION INTRAVENOUS
Status: DISCONTINUED | OUTPATIENT
Start: 2024-04-19 | End: 2024-04-19

## 2024-04-19 RX ORDER — DIPHENHYDRAMINE HYDROCHLORIDE 50 MG/ML
12.5 INJECTION INTRAMUSCULAR; INTRAVENOUS EVERY 6 HOURS PRN
Status: DISCONTINUED | OUTPATIENT
Start: 2024-04-19 | End: 2024-04-19 | Stop reason: HOSPADM

## 2024-04-19 RX ORDER — SODIUM CHLORIDE, SODIUM LACTATE, POTASSIUM CHLORIDE, CALCIUM CHLORIDE 600; 310; 30; 20 MG/100ML; MG/100ML; MG/100ML; MG/100ML
INJECTION, SOLUTION INTRAVENOUS CONTINUOUS
Status: DISCONTINUED | OUTPATIENT
Start: 2024-04-19 | End: 2024-04-19 | Stop reason: HOSPADM

## 2024-04-19 RX ORDER — HYDROMORPHONE HYDROCHLORIDE 2 MG/ML
0.2 INJECTION, SOLUTION INTRAMUSCULAR; INTRAVENOUS; SUBCUTANEOUS EVERY 5 MIN PRN
Status: DISCONTINUED | OUTPATIENT
Start: 2024-04-19 | End: 2024-04-19 | Stop reason: HOSPADM

## 2024-04-19 RX ORDER — PROPOFOL 10 MG/ML
VIAL (ML) INTRAVENOUS
Status: DISCONTINUED | OUTPATIENT
Start: 2024-04-19 | End: 2024-04-19

## 2024-04-19 RX ADMIN — FENTANYL CITRATE 25 MCG: 50 INJECTION, SOLUTION INTRAMUSCULAR; INTRAVENOUS at 11:04

## 2024-04-19 RX ADMIN — DEXAMETHASONE SODIUM PHOSPHATE 4 MG: 4 INJECTION, SOLUTION INTRA-ARTICULAR; INTRALESIONAL; INTRAMUSCULAR; INTRAVENOUS; SOFT TISSUE at 10:04

## 2024-04-19 RX ADMIN — FENTANYL CITRATE 25 MCG: 50 INJECTION, SOLUTION INTRAMUSCULAR; INTRAVENOUS at 10:04

## 2024-04-19 RX ADMIN — GLYCOPYRROLATE 0.1 MG: 0.2 INJECTION, SOLUTION INTRAMUSCULAR; INTRAVITREAL at 11:04

## 2024-04-19 RX ADMIN — KETOROLAC TROMETHAMINE 30 MG: 30 INJECTION, SOLUTION INTRAMUSCULAR; INTRAVENOUS at 11:04

## 2024-04-19 RX ADMIN — ACETAMINOPHEN 1000 MG: 10 INJECTION, SOLUTION INTRAVENOUS at 10:04

## 2024-04-19 RX ADMIN — LIDOCAINE HYDROCHLORIDE 100 MG: 20 INJECTION, SOLUTION INTRAVENOUS at 10:04

## 2024-04-19 RX ADMIN — EPHEDRINE SULFATE 10 MG: 50 INJECTION, SOLUTION INTRAMUSCULAR; INTRAVENOUS; SUBCUTANEOUS at 11:04

## 2024-04-19 RX ADMIN — PROPOFOL 150 MG: 10 INJECTION, EMULSION INTRAVENOUS at 10:04

## 2024-04-19 RX ADMIN — SODIUM CHLORIDE, SODIUM GLUCONATE, SODIUM ACETATE, POTASSIUM CHLORIDE AND MAGNESIUM CHLORIDE: 526; 502; 368; 37; 30 INJECTION, SOLUTION INTRAVENOUS at 08:04

## 2024-04-19 RX ADMIN — CEFAZOLIN 2 G: 2 INJECTION, POWDER, FOR SOLUTION INTRAMUSCULAR; INTRAVENOUS at 10:04

## 2024-04-19 RX ADMIN — MIDAZOLAM HYDROCHLORIDE 2 MG: 1 INJECTION INTRAMUSCULAR; INTRAVENOUS at 10:04

## 2024-04-19 RX ADMIN — HYDROCODONE BITARTRATE AND ACETAMINOPHEN 1 TABLET: 5; 325 TABLET ORAL at 12:04

## 2024-04-19 RX ADMIN — ONDANSETRON 4 MG: 2 INJECTION INTRAMUSCULAR; INTRAVENOUS at 10:04

## 2024-04-19 RX ADMIN — LIDOCAINE HYDROCHLORIDE 10 MG: 10 INJECTION, SOLUTION EPIDURAL; INFILTRATION; INTRACAUDAL; PERINEURAL at 08:04

## 2024-04-19 NOTE — PLAN OF CARE
Discharge instructions given to pt and family, verbalized understanding and questions answered. Handouts provided. Belongings given back to pt. IV removed- catheter intact. Discharge via wheelchair.

## 2024-04-19 NOTE — ANESTHESIA PREPROCEDURE EVALUATION
04/19/2024  Pierre Masters Jr. is a 53 y.o., male.      Pre-op Assessment    I have reviewed the Patient Summary Reports.     I have reviewed the Nursing Notes. I have reviewed the NPO Status.   I have reviewed the Medications.     Review of Systems  Anesthesia Hx:  No problems with previous Anesthesia                Social:  Smoker       Cardiovascular:     Hypertension                                  Hypertension         Pulmonary:    Asthma    Sleep Apnea   Asthma:    Obstructive Sleep Apnea (GILBERT).           Renal/:   renal calculi               Hepatic/GI:     GERD Liver Disease, Hepatitis    Gerd       Liver Disease, Hepatitis        Musculoskeletal:  Arthritis        Arthritis     Spine Disorders: cervical            Neurological:      Arthritis                           Endocrine:  Diabetes    Diabetes                    Morbid Obesity / BMI > 40      Physical Exam  General: Well nourished    Airway:  Mallampati: II   Mouth Opening: Normal  TM Distance: Normal  Neck ROM: Normal ROM        Anesthesia Plan  Type of Anesthesia, risks & benefits discussed:    Anesthesia Type: Gen Supraglottic Airway  Intra-op Monitoring Plan: Standard ASA Monitors  Induction:  IV  Informed Consent: Informed consent signed with the Patient and all parties understand the risks and agree with anesthesia plan.  All questions answered.   ASA Score: 3    Ready For Surgery From Anesthesia Perspective.     .

## 2024-04-19 NOTE — DISCHARGE INSTRUCTIONS
"Ok to remove stents on Friday, 4/26.    Discharge Instructions: After Your Surgery/Procedure  Youve just had surgery. During surgery you were given medicine called anesthesia to keep you relaxed and free of pain. After surgery you may have some pain or nausea. This is common. Here are some tips for feeling better and getting well after surgery.     Stay on schedule with your medication.   Going home  Your doctor or nurse will show you how to take care of yourself when you go home. He or she will also answer your questions. Have an adult family member or friend drive you home.      For your safety we recommend these precaution for the first 24 hours after your procedure:  Do not drive or use heavy equipment.  Do not make important decisions or sign legal papers.  Do not drink alcohol.  Have someone stay with you, if needed. He or she can watch for problems and help keep you safe.  Your concentration, balance, coordination, and judgement may be impaired for many hours after anesthesia.  Use caution when ambulating or standing up.     You may feel weak and "washed out" after anesthesia and surgery.      Subtle residual effects of general anesthesia or sedation with regional / local anesthesia can last more than 24 hours.  Rest for the remainder of the day or longer if your Doctor/Surgeon has advised you to do so.  Although you may feel normal within the first 24 hours, your reflexes and mental ability may be impaired without you realizing it.  You may feel dizzy, lightheaded or sleepy for 24 hours or longer.      Be sure to go to all follow-up visits with your doctor. And rest after your surgery for as long as your doctor tells you to.  Coping with pain  If you have pain after surgery, pain medicine will help you feel better. Take it as told, before pain becomes severe. Also, ask your doctor or pharmacist about other ways to control pain. This might be with heat, ice, or relaxation. And follow any other instructions " your surgeon or nurse gives you.  Tips for taking pain medicine  To get the best relief possible, remember these points:  Pain medicines can upset your stomach. Taking them with a little food may help.  Most pain relievers taken by mouth need at least 20 to 30 minutes to start to work.  Taking medicine on a schedule can help you remember to take it. Try to time your medicine so that you can take it before starting an activity. This might be before you get dressed, go for a walk, or sit down for dinner.  Constipation is a common side effect of pain medicines. Call your doctor before taking any medicines such as laxatives or stool softeners to help ease constipation. Also ask if you should skip any foods. Drinking lots of fluids and eating foods such as fruits and vegetables that are high in fiber can also help. Remember, do not take laxatives unless your surgeon has prescribed them.  Drinking alcohol and taking pain medicine can cause dizziness and slow your breathing. It can even be deadly. Do not drink alcohol while taking pain medicine.  Pain medicine can make you react more slowly to things. Do not drive or run machinery while taking pain medicine.  Your health care provider may tell you to take acetaminophen to help ease your pain. Ask him or her how much you are supposed to take each day. Acetaminophen or other pain relievers may interact with your prescription medicines or other over-the-counter (OTC) drugs. Some prescription medicines have acetaminophen and other ingredients. Using both prescription and OTC acetaminophen for pain can cause you to overdose. Read the labels on your OTC medicines with care. This will help you to clearly know the list of ingredients, how much to take, and any warnings. It may also help you not take too much acetaminophen. If you have questions or do not understand the information, ask your pharmacist or health care provider to explain it to you before you take the OTC  medicine.  Managing nausea  Some people have an upset stomach after surgery. This is often because of anesthesia, pain, or pain medicine, or the stress of surgery. These tips will help you handle nausea and eat healthy foods as you get better. If you were on a special food plan before surgery, ask your doctor if you should follow it while you get better. These tips may help:  Do not push yourself to eat. Your body will tell you when to eat and how much.  Start off with clear liquids and soup. They are easier to digest.  Next try semi-solid foods, such as mashed potatoes, applesauce, and gelatin, as you feel ready.  Slowly move to solid foods. Dont eat fatty, rich, or spicy foods at first.  Do not force yourself to have 3 large meals a day. Instead eat smaller amounts more often.  Take pain medicines with a small amount of solid food, such as crackers or toast, to avoid nausea.     Call your surgeon if  You still have pain an hour after taking medicine. The medicine may not be strong enough.  You feel too sleepy, dizzy, or groggy. The medicine may be too strong.  You have side effects like nausea, vomiting, or skin changes, such as rash, itching, or hives.       If you have obstructive sleep apnea  You were given anesthesia medicine during surgery to keep you comfortable and free of pain. After surgery, you may have more apnea spells because of this medicine and other medicines you were given. The spells may last longer than usual.   At home:  Keep using the continuous positive airway pressure (CPAP) device when you sleep. Unless your health care provider tells you not to, use it when you sleep, day or night. CPAP is a common device used to treat obstructive sleep apnea.  Talk with your provider before taking any pain medicine, muscle relaxants, or sedatives. Your provider will tell you about the possible dangers of taking these medicines.  © 9958-7772 [employer]. [address] [place] [address], [place]  [address]. All rights reserved. This information is not intended as a substitute for professional medical care. Always follow your healthcare professional's instructions.    Post op instructions for prevention of DVT  What is deep vein thrombosis?  Deep vein thrombosis (DVT) is the medical term for blood clots in the deep veins of the leg.  These blood clots can be dangerous.  A DVT can block a blood vessel and keep blood from getting where it needs to go.  Another problem is that the clot can travel to other parts of the body such as the lungs.  A clot that travels to the lungs is called a pulmonary embolus (PE) and can cause serious problems with breathing which can lead to death.  Am I at risk for DVT/PE?  If you are not very active, you are at risk of DVT.  Anyone confined to bed, sitting for long periods of time, recovering from surgery, etc. increases the risk of DVT.  Other risk factors are cancer diagnosis, certain medications, estrogen replacement in any form,older age, obesity, pregnancy, smoking, history of clotting disorders, and dehydration.  How will I know if I have a DVT?  Swelling in the lower leg  Pain  Warmth, redness, hardness or bulging of the vein  If you have any of these symptoms, call your doctors office right away.  Some people will not have any symptoms until the clot moves to the lungs.  What are the symptoms of a PE?  Panting, shortness of breath, or trouble breathing  Sharp, knife-like chest pain when you breathe  Coughing or coughing up blood  Rapid heartbeat  If you have any of these symptoms or get worse quickly, call 911 for emergency treatment.  How can I prevent a DVT?  Avoid long periods of inactivity and dont cross your legs--get up and walk around every hour or so.  Stay active--walking after surgery is highly encouraged.  This means you should get out of the house and walk in the neighborhood.  Going up and down stairs will not impair healing (unless advised against such  activity by your doctor).    Drink plenty of noncaffeinated, nonalcoholic fluids each day to prevent dehydration.  Wear special support stockings, if they have been advised by your doctor.  If you travel, stop at least once an hour and walk around.  Avoid smoking (assistance with stopping is available through your healthcare provider)  Always notify your doctor if you are not able to follow the post operative instructions that are given to you at the time of discharge.  It may be necessary to prescribe one of the medications available to prevent DVT.    Using an Incentive Spirometer    An incentive spirometer is a device that helps you do deep breathing exercises. These exercises expand your lungs, aid in circulation, and help prevent pneumonia. Deep breathing exercises also help you breathe better and improve the function of your lungs by:  Keeping your lungs clear  Strengthening your breathing muscles  Helping prevent respiratory complications or problems  The incentive spirometer gives you a way to take an active part in recover. A nurse or therapist will teach you breathing exercises. To do these exercises, you will breathe in through your mouth and not your nose. The incentive spirometer only works correctly if you breathe in through your mouth.  Steps to clear lungs  Step 1. Exhale normally. Then, inhale normally.  Relax and breathe out.  Step 2. Place your lips tightly around the mouthpiece.  Make sure the device is upright and not tilted.  Step 3. Inhale as much air as you can through the mouthpiece (don't breath through your nose).  Inhale slowly and deeply.  Hold your breath long enough to keep the balls or disk raised for at least 3 to 5 seconds, or as instructed by your healthcare provider.  Some spirometers have an indicator to let you know that you are breathing in too fast. If the indicator goes off, breathe in more slowly.  Step 4. Repeat the exercise regularly.  Do this exercise every hour while  you're awake, or as instructed by your healthcare provider.  If you were taught deep breathing and coughing exercises, do them regularly as instructed by your healthcare provider.     We hope your stay was comfortable as you heal now, mend and rest.    For we have enjoyed taking care of you by giving your our best.    And as you get better, by regaining your health and strength;   We count it as a privilege to have served you and hope your time at [facility] was well spent.      Thank  You!!!

## 2024-04-19 NOTE — PLAN OF CARE
Release per anesthesia vital signs stable instructed on IS no n&v  encouraged deep breaths has all belongings.

## 2024-04-19 NOTE — DISCHARGE SUMMARY
[facility]  Discharge Note  Short Stay    Admit Date: 4/19/2024    Discharge Date and Time: 04/19/2024 11:16 AM      Attending Physician: Fatoumata Wolfe MD     Discharge Provider: Fatoumata Wolfe    Diagnoses:  Active Hospital Problems    Diagnosis  POA    *Calculus of urinary tract in diseases classified elsewhere [N22]  Yes      Resolved Hospital Problems   No resolved problems to display.       Discharged Condition: good    Hospital Course: Patient was admitted for outpatient procedure and tolerated the procedure well with no complications. The patient was discharged home in good condition on the same day.       Final Diagnoses: Same as principal problem.    Disposition: Home or Self Care    Follow up/Patient Instructions:    Medications:  Reconciled Home Medications:   Current Discharge Medication List        START taking these medications    Details   oxyCODONE-acetaminophen (PERCOCET) 5-325 mg per tablet Take 1 tablet by mouth every 4 (four) hours as needed for Pain.  Qty: 10 tablet, Refills: 0    Comments: Quantity prescribed more than 7 day supply? No      phenazopyridine (PYRIDIUM) 100 MG tablet Take 1 tablet (100 mg total) by mouth 3 (three) times daily as needed.  Qty: 30 tablet, Refills: 0           CONTINUE these medications which have NOT CHANGED    Details   albuterol (PROVENTIL) 5 mg/mL nebulizer solution Take 2.5 mg by nebulization every 6 (six) hours as needed for Wheezing. Rescue      albuterol (PROVENTIL/VENTOLIN HFA) 90 mcg/actuation inhaler Inhale 2 puffs into the lungs every 6 (six) hours as needed for Wheezing. Rescue  Qty: 18 g, Refills: 2      allopurinoL (ZYLOPRIM) 300 MG tablet Take 1 tablet (300 mg total) by mouth once daily.  Qty: 90 tablet, Refills: 1      amLODIPine (NORVASC) 5 MG tablet Take 1 tablet (5 mg total) by mouth once daily.  Qty: 30 tablet, Refills: 2    Comments: .  Associated Diagnoses: Type 2 diabetes mellitus with other specified complication,  unspecified whether long term insulin use; Benign essential HTN      aspirin 81 MG Chew Take 81 mg by mouth.      atorvastatin (LIPITOR) 10 MG tablet Take 1 tablet (10 mg total) by mouth once daily.  Qty: 30 tablet, Refills: 2    Associated Diagnoses: Hyperlipidemia, unspecified hyperlipidemia type      cetirizine (ZYRTEC) 10 MG tablet Take 1 tablet (10 mg total) by mouth once daily.  Qty: 30 tablet, Refills: 2    Associated Diagnoses: Seasonal allergies      chlorthalidone (HYGROTEN) 25 MG Tab Take 1 tablet (25 mg total) by mouth once daily.  Qty: 30 tablet, Refills: 2    Comments: .  Associated Diagnoses: Type 2 diabetes mellitus with other specified complication, unspecified whether long term insulin use; Benign essential HTN; Edema, unspecified type      colchicine (COLCRYS) 0.6 mg tablet TAKE 2 TABLETS AT ONSET OF symptoms AND TAKE 1 TABLET 1 HOUR LATER  Qty: 30 tablet, Refills: 2      famciclovir (FAMVIR) 500 MG tablet Take 500 mg by mouth.      hydroCHLOROthiazide (HYDRODIURIL) 12.5 MG Tab Take 12.5 mg by mouth.      hyoscyamine (LEVSIN) 0.125 mg TbDL tablet Take 125 mcg by mouth every 4 (four) hours as needed.      indomethacin (INDOCIN) 50 MG capsule Take 50 mg by mouth 3 (three) times daily.      LINZESS 145 mcg Cap capsule Take 145 mcg by mouth every morning.      lisinopriL 10 MG tablet Take 10 mg by mouth.      losartan (COZAAR) 25 MG tablet Take 1 tablet (25 mg total) by mouth once daily.  Qty: 30 tablet, Refills: 2    Comments: .  Associated Diagnoses: Benign essential HTN      meloxicam (MOBIC) 15 MG tablet Take 1 tablet (15 mg total) by mouth once daily.  Qty: 30 tablet, Refills: 2    Associated Diagnoses: Chronic right shoulder pain; Cervical radiculopathy      metFORMIN (GLUCOPHAGE) 1000 MG tablet Take 1,000 mg by mouth 2 (two) times daily.      omeprazole (PRILOSEC) 40 MG capsule Take 1 capsule (40 mg total) by mouth once daily.  Qty: 30 capsule, Refills: 2    Associated Diagnoses:  Gastroesophageal reflux disease, unspecified whether esophagitis present      ondansetron (ZOFRAN) 8 MG tablet Take 8 mg by mouth every 8 (eight) hours as needed.      tamsulosin (FLOMAX) 0.4 mg Cap Take 1 capsule (0.4 mg total) by mouth once daily.  Qty: 30 capsule, Refills: 11      traMADoL (ULTRAM) 50 mg tablet Take 50 mg by mouth every 6 (six) hours as needed.      azelastine (ASTELIN) 137 mcg (0.1 %) nasal spray 1 spray (137 mcg total) by Nasal route 2 (two) times daily.  Qty: 30 mL, Refills: 2    Associated Diagnoses: Seasonal allergies      clotrimazole-betamethasone 1-0.05% (LOTRISONE) cream Apply topically 2 (two) times daily.  Qty: 45 g, Refills: 1    Associated Diagnoses: Yeast dermatitis      diclofenac (VOLTAREN) 50 MG EC tablet Take 1 tablet (50 mg total) by mouth 2 (two) times daily as needed (pain).  Qty: 20 tablet, Refills: 0      erythromycin (ROMYCIN) ophthalmic ointment Place into both eyes.      fluticasone propionate (FLONASE) 50 mcg/actuation nasal spray SPRAY TWICE INTO EACH NOSTRIL ONCE A DAY AS NEEDED  Qty: 16 g, Refills: 2    Associated Diagnoses: Seasonal allergies      pulse oximeter (PULSE OXIMETER) device by Apply Externally route 2 (two) times a day. Use twice daily at 8 AM and 3 PM and record the value in [employer] as directed.  Qty: 1 each, Refills: 0    Comments: This is a NO CHARGE item.  Please override price to zero.  DO NOT PRINT.  NORMAL MODE e-PRESCRIBE ONLY.      sildenafiL (VIAGRA) 25 MG tablet Take 1 tablet (25 mg total) by mouth daily as needed for Erectile Dysfunction.  Qty: 30 tablet, Refills: 2    Associated Diagnoses: Erectile dysfunction, unspecified erectile dysfunction type      triamcinolone acetonide 0.1% (KENALOG) 0.1 % ointment Apply to affected areas twice a day  Qty: 80 g, Refills: 1    Associated Diagnoses: Yeast dermatitis      ZIRGAN 0.15 % Gel Apply 1 drop to eye.           Discharge Procedure Orders   US Retroperitoneal Complete   Standing Status: Future  Standing Exp. Date: 04/19/25     Order Specific Question Answer Comments   May the Radiologist modify the order per protocol to meet the clinical needs of the patient? Yes    Release to patient Immediate      Diet general     Call MD for:  temperature >100.4     Call MD for:  persistent nausea and vomiting     Call MD for:  severe uncontrolled pain     No dressing needed     Activity as tolerated      Follow-up Information       Fatoumata Wolfe MD Follow up in 3 month(s).    Specialty: Urology  Why: stone follow up  Contact information:  [address] [place] [address]  [address]  [facility] [id number] [phone]                               Fatoumata Wolfe MD  Urology Department

## 2024-04-19 NOTE — OP NOTE
[facility]  Operative Note    Date: 04/19/2024    Pre-Op Diagnosis: Bilateral ureteral stones    Post-Op Diagnosis: same    Procedure(s) Performed:   1.  Bilateral ureteroscopy  2.  Cystoscopy  3.  Laser lithotripsy  4.  Stone basket extraction  5.  Bilateral retrograde pyelogram  6.  Bilateral ureteral stent placement  7.  Fluoro < 1 h    Specimen(s): stone for analysis    Staff Surgeon: Fatoumata Wolfe MD    Anesthesia: General LMA anesthesia    Indications: Pierre Masters Jr. is a 53 y.o. male with a bilateral ureteral stone, presenting for definitive stone management.  He currently does have a JJ ureteral stent in place.      Findings:   - small right ureteral stone grasped with basket and removed in its entirety  - left mid ureteral stone severely impacted, requiring laser lithotripsy and all significant fragments were removed  - ureters cleared of stone bilaterally  - bilateral retrograde pyelogram performed to outline the renal pelvis    Estimated Blood Loss: min    Drains:bilateral - 6 Fr x 26 cm JJ ureteral stent with strings    Procedure in detail:  After informed consent was obtained, the patient was brought the the cystoscopy suite and placed in the supine position.  SCDs were applied and working.  Anesthesia was administered.  The patient was then placed in the dorsal lithotomy position and prepped and draped in the usual sterile fashion.      A rigid cystoscope in a 22 Fr sheath was introduced into the patient's urethra.  This passed easily.  The entire urethra was visualized which showed no strictures or masses.  Formal cystoscopy was performed which revealed no masses or lesions suspicious for malignancy, no bladder stones, no bladder diverticuli, no trabeculations.  The ureteral orifices were visualized in the normal anatomic position bilaterally.      A motion wire was passed up the right ureteral orifice and up into the kidney.  This passed easily and placement was confirmed  using fluoro.  The cystoscope was removed.  The previously placed stent was grasped and removed in its entirety.    An 8 Fr rigid ureteroscope was passed into the patient's bladder alongside the wire under direct vision.  It was then passed through the right ureteral orifice alongside the wire.  A stone was encountered at the level of the mid ureter.  A basket was inserted and the stone was grasped and able to be removed completely.  It was placed in the bladder.  The scope was reinserted up into the proximal ureter and no further stone fragments were seen.  A retrograde pyelogram was performed through the scope to outline the renal pelvis.    A glide wire was passed up the left ureteral orifice and up into the kidney.  There was difficulty passing this past the impacted ureteral stone.  The cystoscope was removed.  The previously placed stent was grasped and removed in its entirety.    An 8 Fr rigid ureteroscope was passed into the patient's bladder alongside the wire under direct vision.  It was then passed through the left ureteral orifice alongside the wire.  A stone was encountered at the level of the mid ureter.  A 273 micron laser fiber was inserted and the stone was fragmented using the laser. A basket was inserted and the stone fragments were grasped and placed within the bladder.  All significant stone fragments were removed.  The scope was reinserted up into the proximal ureter and no further stone fragments were seen.  A retrograde pyelogram was performed through the scope to outline the renal pelvis.  There was still significant inflammation present at the location of the previously impacted stone.    A right 6 Fr x 26 cm JJ ureteral stent with strings was passed over the wire and up into the renal pelvis using fluoro.  When the coil appeared to be in good position in the kidney and the radio-opaque marker of the pusher was at the inferior pubis, the wire was removed under continuous fluoro.  Good coils  were seen in the kidney and the bladder using fluoro.  This was repeated on the left side.     The bladder was irrigated to remove all stone fragments.    The patient tolerated the procedure well and was transferred to the recovery room in stable condition.      Disposition:  The patient will follow up in 3 months with a renal US. He may remove his stents on Friday, 4/26.    Fatoumata Wolfe MD

## 2024-04-19 NOTE — PLAN OF CARE
Pt prepped for surgery. Consents at bedside. Incentive spirometry taught by respiratory. Pt belongings placed in postop cabinet. Brother set up with text alerts.

## 2024-04-19 NOTE — TRANSFER OF CARE
"Anesthesia Transfer of Care Note    Patient: Pierre Masters JrSouth    Procedure(s) Performed: Procedure(s) (LRB):  CYSTOSCOPY, WITH URETERAL STENT REMOVAL (Bilateral)  CYSTOURETEROSCOPY, WITH HOLMIUM LASER LITHOTRIPSY OF URETERAL CALCULUS AND STENT INSERTION (Bilateral)    Patient location: PACU    Anesthesia Type: general    Transport from OR: Transported from OR on 2-3 L/min O2 by NC with adequate spontaneous ventilation    Post pain: adequate analgesia    Post assessment: no apparent anesthetic complications    Post vital signs: stable    Level of consciousness: responds to stimulation and lethargic    Nausea/Vomiting: no nausea/vomiting    Complications: none    Transfer of care protocol was followed      Last vitals: Visit Vitals  BP (!) 208/111 (BP Location: Left arm, Patient Position: Lying)   Pulse 84   Temp 36.7 °C (98.1 °F) (Temporal)   Resp 18   Ht 5' 7" (1.702 m)   Wt 122 kg (269 lb)   SpO2 97%   BMI 42.13 kg/m²     "

## 2024-04-19 NOTE — INTERVAL H&P NOTE
The patient has been examined and the H&P has been reviewed:    I concur with the findings and no changes have occurred since H&P was written.    Surgery risks, benefits and alternative options discussed and understood by patient/family.    bilateral ureteroscopy today       There are no hospital problems to display for this patient.

## 2024-04-22 VITALS
RESPIRATION RATE: 20 BRPM | OXYGEN SATURATION: 96 % | WEIGHT: 269 LBS | HEIGHT: 67 IN | DIASTOLIC BLOOD PRESSURE: 83 MMHG | BODY MASS INDEX: 42.22 KG/M2 | TEMPERATURE: 98 F | HEART RATE: 90 BPM | SYSTOLIC BLOOD PRESSURE: 163 MMHG

## 2024-04-22 NOTE — ANESTHESIA POSTPROCEDURE EVALUATION
Anesthesia Post Evaluation    Patient: Pierre Masters     Procedure(s) Performed: Procedure(s) (LRB):  CYSTOSCOPY, WITH URETERAL STENT REMOVAL (Bilateral)  CYSTOURETEROSCOPY, WITH HOLMIUM LASER LITHOTRIPSY OF URETERAL CALCULUS AND STENT INSERTION (Bilateral)  CYSTOSCOPY, WITH RETROGRADE PYELOGRAM    Final Anesthesia Type: general      Patient location during evaluation: PACU  Patient participation: Yes- Able to Participate  Level of consciousness: awake  Post-procedure vital signs: reviewed and stable  Pain management: adequate  Airway patency: patent    PONV status at discharge: No PONV  Anesthetic complications: no      Cardiovascular status: blood pressure returned to baseline  Respiratory status: unassisted  Hydration status: euvolemic  Follow-up not needed.              Vitals Value Taken Time   /83 04/19/24 1233   Temp 36.7 °C (98 °F) 04/19/24 1120   Pulse 90 04/19/24 1233   Resp 20 04/19/24 1233   SpO2 96 % 04/19/24 1233         Event Time   Out of Recovery 12:18:00         Pain/Blanka Score: No data recorded

## 2024-04-22 NOTE — PROGRESS NOTES
4/22/24 Very pleased with care given.  States all staff were excellent.  States he  has read and understands all discharge instructions.

## 2024-04-26 LAB
COMPN STONE: NORMAL
LABORATORY COMMENT REPORT: NORMAL
SPECIMEN SOURCE: NORMAL
STONE ANALYSIS IR-IMP: NORMAL

## 2024-04-29 LAB
OHS QRS DURATION: 92 MS
OHS QTC CALCULATION: 469 MS

## 2024-05-02 ENCOUNTER — TELEPHONE (OUTPATIENT)
Dept: FAMILY MEDICINE | Facility: CLINIC | Age: 54
End: 2024-05-02
Payer: COMMERCIAL

## 2024-05-02 NOTE — TELEPHONE ENCOUNTER
Spoke to patient that prostate lab is due and he does not need to fast, order at [facility]. Said he will come around 5/31. Out of town right now. Updated remind me.

## 2024-05-02 NOTE — TELEPHONE ENCOUNTER
----- Message from RT Lalito sent at 4/29/2024  8:05 AM CDT -----  Regarding: FW: PSA due    ----- Message -----  From: Vandana Go LPN  Sent: 4/17/2024   7:52 AM CDT  To: Idalia Hercules Staff  Subject: FW: PSA due                                        ----- Message -----  From: Opal Campbell RT  Sent: 4/2/2024  12:00 AM CDT  To: RT Lalito  Subject: PSA due                                          1/2/2024 Pt here for labs. Annual orders entered. Remind me for PSA in April

## 2024-06-06 ENCOUNTER — TELEPHONE (OUTPATIENT)
Dept: FAMILY MEDICINE | Facility: CLINIC | Age: 54
End: 2024-06-06
Payer: MEDICAID

## 2024-06-06 NOTE — TELEPHONE ENCOUNTER
----- Message from RT Laltio sent at 4/29/2024  8:05 AM CDT -----  Regarding: FW: PSA due    ----- Message -----  From: Vandana Go LPN  Sent: 4/17/2024   7:52 AM CDT  To: Idalia Hercules Staff  Subject: FW: PSA due                                        ----- Message -----  From: Opal Capmbell RT  Sent: 4/2/2024  12:00 AM CDT  To: RT Lalito  Subject: PSA due                                          1/2/2024 Pt here for labs. Annual orders entered. Remind me for PSA in April

## 2024-06-20 ENCOUNTER — TELEPHONE (OUTPATIENT)
Dept: FAMILY MEDICINE | Facility: CLINIC | Age: 54
End: 2024-06-20
Payer: MEDICAID

## 2024-06-20 NOTE — TELEPHONE ENCOUNTER
Spoke to patient that lab is due to recheck prostate. Said he knows and will get done as soon as he gets back in town. This makes 3 attempts. Can I sign reminder?

## 2024-07-22 ENCOUNTER — HOSPITAL ENCOUNTER (OUTPATIENT)
Dept: RADIOLOGY | Facility: HOSPITAL | Age: 54
Discharge: HOME | End: 2024-07-22
Attending: STUDENT IN AN ORGANIZED HEALTH CARE EDUCATION/TRAINING PROGRAM
Payer: MEDICAID

## 2024-07-22 DIAGNOSIS — N22 CALCULUS OF URINARY TRACT IN DISEASES CLASSIFIED ELSEWHERE: ICD-10-CM

## 2024-07-22 PROCEDURE — 76770 US EXAM ABDO BACK WALL COMP: CPT | Mod: TC

## 2024-07-22 PROCEDURE — 76770 US EXAM ABDO BACK WALL COMP: CPT | Mod: 26,,, | Performed by: RADIOLOGY

## 2024-07-30 ENCOUNTER — TELEPHONE (OUTPATIENT)
Dept: FAMILY MEDICINE | Facility: CLINIC | Age: 54
End: 2024-07-30
Payer: MEDICAID

## 2024-07-30 NOTE — TELEPHONE ENCOUNTER
[facility] requesting additional code for PSA. Screening code used. I added Z79.899, denied. He is over the 1 year royal to get this done so I can't understand why they are denying it, unless he had somewhere else, but I don't see anything. Any other codes we can use? BPH is covered but I don't see this on his list.

## 2024-08-06 ENCOUNTER — TELEPHONE (OUTPATIENT)
Dept: FAMILY MEDICINE | Facility: CLINIC | Age: 54
End: 2024-08-06
Payer: MEDICAID

## 2024-09-03 ENCOUNTER — PATIENT MESSAGE (OUTPATIENT)
Dept: ADMINISTRATIVE | Facility: HOSPITAL | Age: 54
End: 2024-09-03
Payer: COMMERCIAL

## 2024-10-08 ENCOUNTER — PATIENT MESSAGE (OUTPATIENT)
Dept: FAMILY MEDICINE | Facility: CLINIC | Age: 54
End: 2024-10-08
Payer: COMMERCIAL

## 2025-02-13 ENCOUNTER — HOSPITAL ENCOUNTER (EMERGENCY)
Facility: HOSPITAL | Age: 55
Discharge: HOME | End: 2025-02-13
Attending: EMERGENCY MEDICINE
Payer: COMMERCIAL

## 2025-02-13 VITALS
TEMPERATURE: 98 F | HEART RATE: 82 BPM | DIASTOLIC BLOOD PRESSURE: 95 MMHG | OXYGEN SATURATION: 97 % | SYSTOLIC BLOOD PRESSURE: 182 MMHG | RESPIRATION RATE: 18 BRPM | WEIGHT: 269 LBS | BODY MASS INDEX: 42.22 KG/M2 | HEIGHT: 67 IN

## 2025-02-13 DIAGNOSIS — M10.9 ACUTE GOUTY ARTHRITIS: Primary | ICD-10-CM

## 2025-02-13 DIAGNOSIS — M79.674 GREAT TOE PAIN, RIGHT: ICD-10-CM

## 2025-02-13 DIAGNOSIS — I10 HYPERTENSION, UNSPECIFIED TYPE: ICD-10-CM

## 2025-02-13 DIAGNOSIS — K21.9 GASTROESOPHAGEAL REFLUX DISEASE, UNSPECIFIED WHETHER ESOPHAGITIS PRESENT: ICD-10-CM

## 2025-02-13 PROCEDURE — 25000003 PHARM REV CODE 250: Performed by: EMERGENCY MEDICINE

## 2025-02-13 PROCEDURE — 63600175 PHARM REV CODE 636 W HCPCS: Mod: TB,JZ | Performed by: EMERGENCY MEDICINE

## 2025-02-13 PROCEDURE — 94760 N-INVAS EAR/PLS OXIMETRY 1: CPT

## 2025-02-13 PROCEDURE — 96372 THER/PROPH/DIAG INJ SC/IM: CPT | Performed by: EMERGENCY MEDICINE

## 2025-02-13 PROCEDURE — 99284 EMERGENCY DEPT VISIT MOD MDM: CPT | Mod: 25

## 2025-02-13 RX ORDER — LOSARTAN POTASSIUM 25 MG/1
25 TABLET ORAL
Status: COMPLETED | OUTPATIENT
Start: 2025-02-13 | End: 2025-02-13

## 2025-02-13 RX ORDER — KETOROLAC TROMETHAMINE 30 MG/ML
30 INJECTION, SOLUTION INTRAMUSCULAR; INTRAVENOUS
Status: COMPLETED | OUTPATIENT
Start: 2025-02-13 | End: 2025-02-13

## 2025-02-13 RX ORDER — LISINOPRIL 2.5 MG/1
5 TABLET ORAL
Status: COMPLETED | OUTPATIENT
Start: 2025-02-13 | End: 2025-02-13

## 2025-02-13 RX ORDER — OMEPRAZOLE 40 MG/1
40 CAPSULE, DELAYED RELEASE ORAL DAILY
Qty: 30 CAPSULE | Refills: 2 | Status: SHIPPED | OUTPATIENT
Start: 2025-02-13

## 2025-02-13 RX ORDER — LOSARTAN POTASSIUM 25 MG/1
25 TABLET ORAL DAILY
Status: DISCONTINUED | OUTPATIENT
Start: 2025-02-14 | End: 2025-02-13

## 2025-02-13 RX ORDER — COLCHICINE 0.6 MG/1
TABLET ORAL
Qty: 30 TABLET | Refills: 2 | Status: SHIPPED | OUTPATIENT
Start: 2025-02-13

## 2025-02-13 RX ORDER — INDOMETHACIN 50 MG/1
50 CAPSULE ORAL 3 TIMES DAILY
Qty: 30 CAPSULE | Refills: 0 | Status: SHIPPED | OUTPATIENT
Start: 2025-02-13 | End: 2025-02-23

## 2025-02-13 RX ORDER — AMLODIPINE BESYLATE 5 MG/1
5 TABLET ORAL
Status: COMPLETED | OUTPATIENT
Start: 2025-02-13 | End: 2025-02-13

## 2025-02-13 RX ADMIN — AMLODIPINE BESYLATE 5 MG: 5 TABLET ORAL at 08:02

## 2025-02-13 RX ADMIN — LISINOPRIL 5 MG: 2.5 TABLET ORAL at 08:02

## 2025-02-13 RX ADMIN — LOSARTAN POTASSIUM 25 MG: 25 TABLET, FILM COATED ORAL at 08:02

## 2025-02-13 RX ADMIN — KETOROLAC TROMETHAMINE 30 MG: 30 INJECTION, SOLUTION INTRAMUSCULAR; INTRAVENOUS at 08:02

## 2025-02-14 NOTE — ED PROVIDER NOTES
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  Reports a soon as he returned to [facility] he came to the ER for evaluation he reports he has been wearing a boot on the right foot in order to support the foot.  He has been out of his anti-inflammatories and does not have any colchicine.  He takes allopurinol daily.  He reports pain is worse in the 1st MCP joint of the right foot but also radiates sent they ankle but has full range motion of the ankle.  Swelling of the entire right foot and ankle.  No erythema.  He reports no fever chills sweats.  He reports that has feels very similar to his previous gout.  No other joints involved.      Review of patient's allergies indicates:  No Known Allergies  Past Medical History:   Diagnosis Date    Allergy     Asthma     Diabetes mellitus     Diabetes mellitus, type 2     GERD (gastroesophageal reflux disease)     Gout     Gout     Hyperlipidemia     Hypertension     Lupus     Sleep apnea     Sleep apnea      Past Surgical History:   Procedure Laterality Date    BACK SURGERY      CYSTOSCOPY W/ RETROGRADES  4/19/2024    Procedure: CYSTOSCOPY, WITH RETROGRADE PYELOGRAM;  Surgeon: Fatoumata Wolfe MD;  Location: [facility];  Service: Urology;;    CYSTOSCOPY W/ URETERAL STENT REMOVAL Bilateral 4/19/2024    Procedure: CYSTOSCOPY, WITH URETERAL STENT REMOVAL;  Surgeon: Fatoumata Wolfe  MD JACKSON;  Location: [facility];  Service: Urology;  Laterality: Bilateral;    CYSTOURETEROSCOPY, WITH HOLMIUM LASER LITHOTRIPSY OF URETERAL CALCULUS AND STENT INSERTION Bilateral 4/19/2024    Procedure: CYSTOURETEROSCOPY, WITH HOLMIUM LASER LITHOTRIPSY OF URETERAL CALCULUS AND STENT INSERTION;  Surgeon: Fatoumata Wolfe MD;  Location: [facility];  Service: Urology;  Laterality: Bilateral;    LIPOMA RESECTION      ROTATOR CUFF REPAIR Left      Family History   Problem Relation Name Age of Onset    Heart disease Mother      Diabetes Father      Hypertension Father      Cancer Father      Atrial fibrillation Brother      Heart disease Brother       Social History     Tobacco Use    Smoking status: Heavy Smoker     Current packs/day: 1.00     Types: Cigarettes    Smokeless tobacco: Never   Substance Use Topics    Alcohol use: No    Drug use: No     Review of Systems   Constitutional:  Negative for activity change, appetite change, chills, diaphoresis, fatigue and fever.   Respiratory:  Negative for shortness of breath.    Musculoskeletal:  Positive for arthralgias, gait problem and joint swelling. Negative for myalgias and neck pain.   Skin:  Negative for color change, pallor, rash and wound.   Neurological:  Positive for numbness. Negative for weakness.   Hematological:  Does not bruise/bleed easily.   Psychiatric/Behavioral:  Negative for confusion.        Physical Exam     Initial Vitals [02/13/25 1949]   BP Pulse Resp Temp SpO2   (!) 181/104 97 18 98 °F (36.7 °C) 99 %      MAP       --         Physical Exam    Nursing note and vitals reviewed.  Constitutional: He appears well-developed and well-nourished. He is not diaphoretic. No distress.   Cardiovascular:  Normal rate, regular rhythm, normal heart sounds and intact distal pulses.           Pulmonary/Chest: No respiratory distress.   Musculoskeletal:         General: Tenderness and edema present.      Right lower leg: Normal.      Right ankle: Swelling present. No  deformity, ecchymosis or lacerations. No tenderness. Normal range of motion. Anterior drawer test negative. Normal pulse.      Right foot: Normal range of motion and normal capillary refill. Swelling, tenderness and bony tenderness present. No deformity, bunion, foot drop, prominent metatarsal heads, laceration or crepitus. Normal pulse.        Feet:       Comments: swelling throughout the right foot but no pain on palpation of the ankle and is ranging his ankle without significant discomfort.  He has tenderness and pain on palpation of the 1st metatarsal and at the 1st MTP joint no redness.  Mild swelling.  He is able to flex and extend the toe but with severe pain.  Mild pain throughout the rest of the foot.  2+ DP and posterior tibial pulse     Neurological: He is alert and oriented to person, place, and time. He has normal strength. No sensory deficit. GCS score is 15. GCS eye subscore is 4. GCS verbal subscore is 5. GCS motor subscore is 6.   Skin: Skin is warm and dry. Capillary refill takes less than 2 seconds. No rash noted. No erythema. No pallor.         ED Course   Procedures  Labs Reviewed - No data to display       Imaging Results    None          Medications   ketorolac injection 30 mg (30 mg Intramuscular Given 2/13/25 2026)   lisinopriL tablet 5 mg (5 mg Oral Given 2/13/25 2025)   amLODIPine tablet 5 mg (5 mg Oral Given 2/13/25 2025)   losartan tablet 25 mg (25 mg Oral Given 2/13/25 2051)     Medical Decision Making  Emergent eval of a 54-year-old male with history of hypertension (reports he would not take his blood pressure medications including losartan 25 mg, lisinopril 5 mg and Norvasc 5 mg today), hyperlipidemia, type 2 diabetes, lupus, gout, sleep apnea, and GERD presents to the ER due to gouty pain in the right great toe for the past 2 days 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  Reports a soon as he returned to [facility] he came to the ER for evaluation he reports he has been wearing a boot on the right foot in order to support the foot.  He has been out of his anti-inflammatories and does not have any colchicine.  He takes allopurinol daily.  He reports pain is worse in the 1st MCP joint of the right foot but also radiates sent they ankle but has full range motion of the ankle.  Swelling of the entire right foot and ankle.  No erythema.  He reports no fever chills sweats.  He reports that has feels very similar to his previous gout.  No other joints involved.  On physical exam patient is in no distress blood pressure 185/105 pulse 88 respirations 19 sats 95% temperature 98°.  Patient has swelling throughout the right foot but no pain on palpation of the ankle and is ranging his ankle without significant discomfort.  He has tenderness and pain on palpation of the 1st metatarsal and at the 1st MTP joint no redness.  Mild swelling.  He is able to flex and extend the toe but with severe pain.  Mild pain throughout the rest of the foot.  2+ DP and posterior tibial pulse  No abnormalities of left foot  No calf pain normal cardiac and lung exam  MDM    Patient presents for emergent evaluation of acute right 1st MTP joint pain and ankle pain for 2 days works as a  believes he is having a gout that poses a threat to life and/or bodily function.   Differential diagnosis includes but was not limited to gout, pseudogout, osteoarthritis, septic joint, trauma, plantar fasciitis, overuse injury  In the ED patient found to have acute gout of right 1st metatarsophalangeal joint    Discharge MDM     Patient was managed in the ED with IM Toradol 30 mg.  Patient's home blood pressure medications which she reports he normally takes in the morning which include losartan 25 mg, Norvasc 5 mg and lisinopril 5 mg.  Will rechecked blood pressure but if blood pressure does  not improve patient will not be able to received the steroids he is requesting.  Will be prescribed indomethacin which she reports helps best for his gout flares.  And colchicine 1.25 mg x 1 and 0.6 mg 1 hour later.  He reports he does not have his use with stomach upset with NSAIDs the patient does have a home prescription for antacids if needed  The response to treatment was good.    Blood pressure remained elevated lowest blood pressure was 182/95 discussed with the patient that has not safe to do steroids.  Instead will be discharged home taking his NSAIDs and 1 dose of colchicine also refilled his antacid prescription.  Patient was discharged in stable condition.  Detailed return precautions discussed.  Patient was told to follow up with primary care physician or specialist based on their diagnosis  Cindy Aceves MD      Risk  Prescription drug management.                                      Clinical Impression:  Final diagnoses:  [M10.9] Acute gouty arthritis (Primary)  [M79.674] Great toe pain, right  [I10] Hypertension, unspecified type          ED Disposition Condition    Discharge Stable          ED Prescriptions       Medication Sig Dispense Start Date End Date Auth. Provider    colchicine (COLCRYS) 0.6 mg tablet TAKE 2 TABLETS AT ONSET OF symptoms AND TAKE 1 TABLET 1 HOUR LATER 30 tablet 2/13/2025 -- Cindy Aceves MD    indomethacin (INDOCIN) 50 MG capsule Take 1 capsule (50 mg total) by mouth 3 (three) times daily. for 10 days 30 capsule 2/13/2025 2/23/2025 Cindy Aceves MD    omeprazole (PRILOSEC) 40 MG capsule Take 1 capsule (40 mg total) by mouth once daily. 30 capsule 2/13/2025 -- Cindy Aceves MD          Follow-up Information       Follow up With Specialties Details Why Contact Info Additional Information    [facility] ED Emergency Medicine Go to  If symptoms worsen [address] [place] [address] [address] [facility] [id number] 1st floor    Idalia Hercules NP Family  Medicine Schedule an appointment as soon as possible for a visit in 1 week If your symptoms do not improve [address] [facility]  [address]  [facility] [address]  [phone]                Cindy Aceves MD  02/13/25 5944

## 2025-02-17 ENCOUNTER — PATIENT MESSAGE (OUTPATIENT)
Dept: ADMINISTRATIVE | Facility: HOSPITAL | Age: 55
End: 2025-02-17
Payer: COMMERCIAL

## 2025-02-27 NOTE — PROGRESS NOTES
SUBJECTIVE:    Patient ID: Pierre Masters Jr. is a 52 y.o. male.    Chief Complaint: Medication Refill (No bottles/ Pt is here for med refill all meds/ Decline flu/Colon, eye, foot exam, smoking cessation order/BG)    52 year old male presents for check up. Patient is treated for htn, gout, hyperlipidemia, allergies, gerd,, oa and dm. bp in office is elevated. Not checking at home. Reports has been out of medication for about 2 weeks.No chest pain. No sob. A hga1c is 5.5mg/dl.  Still has daily back pain. Due for labs. Plans to have done today. Still smoking. Due for colonoscopy      Office Visit on 03/29/2023   Component Date Value Ref Range Status    Hemoglobin A1C, POC 03/29/2023 5.5  % Final    WBC 03/29/2023 5.8  3.8 - 10.8 Thousand/uL Final    RBC 03/29/2023 4.74  4.20 - 5.80 Million/uL Final    Hemoglobin 03/29/2023 14.4  13.2 - 17.1 g/dL Final    Hematocrit 03/29/2023 43.5  38.5 - 50.0 % Final    MCV 03/29/2023 91.8  80.0 - 100.0 fL Final    MCH 03/29/2023 30.4  27.0 - 33.0 pg Final    MCHC 03/29/2023 33.1  32.0 - 36.0 g/dL Final    RDW 03/29/2023 12.3  11.0 - 15.0 % Final    Platelets 03/29/2023 238  140 - 400 Thousand/uL Final    MPV 03/29/2023 11.5  7.5 - 12.5 fL Final    Neutrophils, Abs 03/29/2023 2,140  1,500 - 7,800 cells/uL Final    Lymph # 03/29/2023 2,842  850 - 3,900 cells/uL Final    Mono # 03/29/2023 464  200 - 950 cells/uL Final    Eos # 03/29/2023 261  15 - 500 cells/uL Final    Baso # 03/29/2023 93  0 - 200 cells/uL Final    Neutrophils Relative 03/29/2023 36.9  % Final    Lymph % 03/29/2023 49.0  % Final    Mono % 03/29/2023 8.0  % Final    Eosinophil % 03/29/2023 4.5  % Final    Basophil % 03/29/2023 1.6  % Final    Glucose 03/29/2023 103 (H)  65 - 99 mg/dL Final    BUN 03/29/2023 13  7 - 25 mg/dL Final    Creatinine 03/29/2023 1.01  0.70 - 1.30 mg/dL Final    eGFR 03/29/2023 90  > OR = 60 mL/min/1.73m2 Final    BUN/Creatinine Ratio 03/29/2023 NOT APPLICABLE  6 - 22 (calc) Final     Left message for Evi, telling her the medication was changed from a dispersible medication to a tablet and sent to the pharmacy.   Sodium 03/29/2023 143  135 - 146 mmol/L Final    Potassium 03/29/2023 4.1  3.5 - 5.3 mmol/L Final    Chloride 03/29/2023 106  98 - 110 mmol/L Final    CO2 03/29/2023 29  20 - 32 mmol/L Final    Calcium 03/29/2023 9.2  8.6 - 10.3 mg/dL Final    Total Protein 03/29/2023 6.8  6.1 - 8.1 g/dL Final    Albumin 03/29/2023 4.3  3.6 - 5.1 g/dL Final    Globulin, Total 03/29/2023 2.5  1.9 - 3.7 g/dL (calc) Final    Albumin/Globulin Ratio 03/29/2023 1.7  1.0 - 2.5 (calc) Final    Total Bilirubin 03/29/2023 1.0  0.2 - 1.2 mg/dL Final    Alkaline Phosphatase 03/29/2023 90  35 - 144 U/L Final    AST 03/29/2023 23  10 - 35 U/L Final    ALT 03/29/2023 24  9 - 46 U/L Final    Cholesterol 03/29/2023 123  <200 mg/dL Final    HDL 03/29/2023 35 (L)  > OR = 40 mg/dL Final    Triglycerides 03/29/2023 128  <150 mg/dL Final    LDL Cholesterol 03/29/2023 67  mg/dL (calc) Final    HDL/Cholesterol Ratio 03/29/2023 3.5  <5.0 (calc) Final    Non HDL Chol. (LDL+VLDL) 03/29/2023 88  <130 mg/dL (calc) Final    PROSTATE SPECIFIC ANTIGEN, SCR - Q* 03/29/2023 0.27  < OR = 4.00 ng/mL Final    TSH w/reflex to FT4 03/29/2023 1.60  0.40 - 4.50 mIU/L Final   Admission on 10/19/2022, Discharged on 10/19/2022   Component Date Value Ref Range Status    HIV 1/2 Ag/Ab 10/19/2022 Non-reactive  Non-reactive Final       Past Medical History:   Diagnosis Date    Allergy     Asthma     Diabetes mellitus     Diabetes mellitus, type 2     GERD (gastroesophageal reflux disease)     Gout     Gout     Hyperlipidemia     Hypertension     Lupus     Sleep apnea     Sleep apnea      Social History     Socioeconomic History    Marital status:    Tobacco Use    Smoking status: Heavy Smoker     Packs/day: 1.00     Types: Cigarettes    Smokeless tobacco: Never   Substance and Sexual Activity    Alcohol use: No    Drug use: No   Social History Narrative    Always in pain does not sleep well    No exercise     Past Surgical History:   Procedure Laterality Date    BACK  SURGERY      LIPOMA RESECTION      ROTATOR CUFF REPAIR Left      Family History   Problem Relation Age of Onset    Heart disease Mother     Diabetes Father     Hypertension Father     Cancer Father     Atrial fibrillation Brother     Heart disease Brother        Review of patient's allergies indicates:  No Known Allergies    Current Outpatient Medications:     albuterol (PROVENTIL) 5 mg/mL nebulizer solution, Take 2.5 mg by nebulization every 6 (six) hours as needed for Wheezing. Rescue, Disp: , Rfl:     albuterol (PROVENTIL/VENTOLIN HFA) 90 mcg/actuation inhaler, 1-2 puff  Every 6 hrs prn wheeze or sob (Patient taking differently: 1-2 puff  Every 6 hrs prn wheeze or sob), Disp: 18 g, Rfl: 2    allopurinoL (ZYLOPRIM) 300 MG tablet, Take 1 tablet (300 mg total) by mouth once daily., Disp: 90 tablet, Rfl: 1    amLODIPine (NORVASC) 5 MG tablet, Take 1 tablet (5 mg total) by mouth once daily., Disp: 90 tablet, Rfl: 1    aspirin 81 MG Chew, Take 81 mg by mouth., Disp: , Rfl:     atorvastatin (LIPITOR) 10 MG tablet, Take 1 tablet (10 mg total) by mouth once daily., Disp: 90 tablet, Rfl: 1    azelastine (ASTELIN) 137 mcg (0.1 %) nasal spray, 1 spray (137 mcg total) by Nasal route 2 (two) times daily., Disp: 30 mL, Rfl: 2    cetirizine (ZYRTEC) 10 MG tablet, Take 1 tablet (10 mg total) by mouth once daily., Disp: 90 tablet, Rfl: 1    chlorthalidone (HYGROTEN) 25 MG Tab, Take 1 tablet (25 mg total) by mouth once daily., Disp: 30 tablet, Rfl: 11    clotrimazole-betamethasone 1-0.05% (LOTRISONE) cream, Apply topically 2 (two) times daily., Disp: 45 g, Rfl: 1    colchicine (COLCRYS) 0.6 mg tablet, TAKE 2 TABLETS AT ONSET OF symptoms AND TAKE 1 TABLET 1 HOUR LATER, Disp: 30 tablet, Rfl: 2    diclofenac (VOLTAREN) 50 MG EC tablet, Take 1 tablet (50 mg total) by mouth 2 (two) times daily as needed (pain)., Disp: 20 tablet, Rfl: 0    fluticasone propionate (FLONASE) 50 mcg/actuation nasal spray, SPRAY TWICE INTO EACH NOSTRIL ONCE A  DAY AS NEEDED, Disp: 16 g, Rfl: 2    HYDROcodone-acetaminophen (NORCO) 5-325 mg per tablet, Take 1 tablet by mouth every 4 (four) hours as needed for Pain., Disp: 10 tablet, Rfl: 0    losartan (COZAAR) 25 MG tablet, Take 1 tablet (25 mg total) by mouth once daily., Disp: 90 tablet, Rfl: 1    meloxicam (MOBIC) 15 MG tablet, Take 1 tablet (15 mg total) by mouth once daily., Disp: 90 tablet, Rfl: 1    metFORMIN (GLUCOPHAGE) 1000 MG tablet, Take 1 tablet (1,000 mg total) by mouth 2 (two) times daily with meals., Disp: 180 tablet, Rfl: 1    nystatin (MYCOSTATIN) powder, Apply topically 2 (two) times daily. (Patient not taking: No sig reported), Disp: 60 g, Rfl: 1    omeprazole (PRILOSEC) 40 MG capsule, Take 1 capsule (40 mg total) by mouth once daily., Disp: 90 capsule, Rfl: 1    pulse oximeter (PULSE OXIMETER) device, by Apply Externally route 2 (two) times a day. Use twice daily at 8 AM and 3 PM and record the value in MyChart as directed., Disp: 1 each, Rfl: 0    sildenafiL (VIAGRA) 25 MG tablet, Take 1 tablet (25 mg total) by mouth daily as needed for Erectile Dysfunction. (Patient not taking: No sig reported), Disp: 30 tablet, Rfl: 2    triamcinolone acetonide 0.1% (KENALOG) 0.1 % ointment, Apply to affected areas twice a day, Disp: 80 g, Rfl: 1    Review of Systems   Constitutional:  Negative for fatigue, fever and unexpected weight change.   HENT:  Negative for ear pain, sinus pressure and sore throat.    Eyes:  Negative for pain.   Respiratory:  Negative for cough and shortness of breath.    Cardiovascular:  Negative for chest pain and leg swelling.   Gastrointestinal:  Negative for abdominal pain, constipation, nausea and vomiting.   Genitourinary:  Negative for dysuria, frequency and urgency.   Musculoskeletal:  Negative for arthralgias.   Skin:  Negative for rash.   Neurological:  Negative for dizziness, weakness and headaches.   Psychiatric/Behavioral:  Negative for sleep disturbance.         Objective:     "  Vitals:    03/29/23 0820   BP: (!) 162/100   Pulse: 83   SpO2: 98%   Weight: 121.4 kg (267 lb 9.6 oz)   Height: 5' 6" (1.676 m)     Physical Exam  Vitals and nursing note reviewed.   Constitutional:       General: He is not in acute distress.     Appearance: Normal appearance. He is well-developed. He is obese.   HENT:      Head: Normocephalic and atraumatic.      Right Ear: External ear normal.      Left Ear: External ear normal.   Eyes:      Pupils: Pupils are equal, round, and reactive to light.   Neck:      Trachea: No tracheal deviation.   Cardiovascular:      Rate and Rhythm: Normal rate and regular rhythm.      Heart sounds: No murmur heard.    No friction rub. No gallop.   Pulmonary:      Breath sounds: Normal breath sounds. No stridor. No wheezing or rales.   Abdominal:      Palpations: Abdomen is soft. There is no mass.      Tenderness: There is no abdominal tenderness.   Musculoskeletal:         General: No tenderness or deformity.      Cervical back: Neck supple.      Lumbar back: Decreased range of motion.   Lymphadenopathy:      Cervical: No cervical adenopathy.   Skin:     General: Skin is warm and dry.   Neurological:      Mental Status: He is alert and oriented to person, place, and time.      Coordination: Coordination normal.   Psychiatric:         Thought Content: Thought content normal.         Assessment:       1. High risk medication use    2. Smoking    3. Type 2 diabetes mellitus with other specified complication, unspecified whether long term insulin use    4. Benign essential HTN    5. Benign essential HTN    6. Edema, unspecified type    7. Screening for prostate cancer    8. Gastroesophageal reflux disease, unspecified whether esophagitis present    9. Arthralgia, unspecified joint    10. Class 3 severe obesity due to excess calories with serious comorbidity and body mass index (BMI) of 40.0 to 44.9 in adult         Plan:       High risk medication use  -     CBC Auto Differential; " Future; Expected date: 03/29/2023  -     Comprehensive Metabolic Panel; Future; Expected date: 03/29/2023  -     Lipid Panel; Future; Expected date: 03/29/2023  -     Urinalysis, Reflex to Urine Culture Urine, Clean Catch; Future; Expected date: 03/29/2023  -     PSA, Screening; Future; Expected date: 03/29/2023  -     TSH w/reflex to FT4; Future; Expected date: 03/29/2023  -     Microalbumin/Creatinine Ratio, Urine; Future; Expected date: 03/29/2023    Smoking  -     Ambulatory referral/consult to Smoking Cessation Program; Future; Expected date: 04/05/2023    Type 2 diabetes mellitus with other specified complication, unspecified whether long term insulin use  -     Ambulatory referral/consult to Smoking Cessation Program; Future; Expected date: 04/05/2023  -     Hemoglobin A1C, POCT  -     HM DIABETES FOOT EXAM  -     Ambulatory referral/consult to Gastroenterology; Future; Expected date: 04/05/2023  -     Ambulatory referral/consult to Ophthalmology; Future; Expected date: 04/05/2023  -     amLODIPine (NORVASC) 5 MG tablet; Take 1 tablet (5 mg total) by mouth once daily.  Dispense: 90 tablet; Refill: 1  -     chlorthalidone (HYGROTEN) 25 MG Tab; Take 1 tablet (25 mg total) by mouth once daily.  Dispense: 30 tablet; Refill: 11  -     CBC Auto Differential; Future; Expected date: 03/29/2023  -     Comprehensive Metabolic Panel; Future; Expected date: 03/29/2023  -     Lipid Panel; Future; Expected date: 03/29/2023  -     Urinalysis, Reflex to Urine Culture Urine, Clean Catch; Future; Expected date: 03/29/2023  -     PSA, Screening; Future; Expected date: 03/29/2023  -     TSH w/reflex to FT4; Future; Expected date: 03/29/2023  -     Microalbumin/Creatinine Ratio, Urine; Future; Expected date: 03/29/2023    Benign essential HTN  Comments:  BP elevated in office today, but patient did not take BP medications yet.  Take BP medications ASAP - begin logging BP b.i.d. x 2 weeks and f/u for a nurse visi  Orders:  -      amLODIPine (NORVASC) 5 MG tablet; Take 1 tablet (5 mg total) by mouth once daily.  Dispense: 90 tablet; Refill: 1  -     chlorthalidone (HYGROTEN) 25 MG Tab; Take 1 tablet (25 mg total) by mouth once daily.  Dispense: 30 tablet; Refill: 11    Benign essential HTN  Comments:  monitor bp . readings to office next week  Orders:  -     amLODIPine (NORVASC) 5 MG tablet; Take 1 tablet (5 mg total) by mouth once daily.  Dispense: 90 tablet; Refill: 1  -     chlorthalidone (HYGROTEN) 25 MG Tab; Take 1 tablet (25 mg total) by mouth once daily.  Dispense: 30 tablet; Refill: 11    Edema, unspecified type  Comments:  decrease norvasc to 5mg daily. add fluid pill. call if no improvement.   Orders:  -     chlorthalidone (HYGROTEN) 25 MG Tab; Take 1 tablet (25 mg total) by mouth once daily.  Dispense: 30 tablet; Refill: 11    Screening for prostate cancer  -     PSA, Screening; Future; Expected date: 03/29/2023    Gastroesophageal reflux disease, unspecified whether esophagitis present    Arthralgia, unspecified joint    Class 3 severe obesity due to excess calories with serious comorbidity and body mass index (BMI) of 40.0 to 44.9 in adult      Follow up in about 3 months (around 6/29/2023), or if symptoms worsen or fail to improve, for medication management.        4/10/2023 Idalia Hercules

## 2025-03-10 ENCOUNTER — PATIENT MESSAGE (OUTPATIENT)
Dept: ADMINISTRATIVE | Facility: HOSPITAL | Age: 55
End: 2025-03-10
Payer: COMMERCIAL

## 2025-05-15 ENCOUNTER — OCCUPATIONAL HEALTH (OUTPATIENT)
Dept: URGENT CARE | Facility: CLINIC | Age: 55
End: 2025-05-15

## 2025-05-23 ENCOUNTER — PATIENT MESSAGE (OUTPATIENT)
Dept: ADMINISTRATIVE | Facility: HOSPITAL | Age: 55
End: 2025-05-23
Payer: COMMERCIAL

## 2025-08-05 ENCOUNTER — OCCUPATIONAL HEALTH (OUTPATIENT)
Dept: URGENT CARE | Facility: CLINIC | Age: 55
End: 2025-08-05

## 2025-08-05 PROCEDURE — 80305 DRUG TEST PRSMV DIR OPT OBS: CPT | Mod: S$GLB,,, | Performed by: EMERGENCY MEDICINE

## 2025-08-06 ENCOUNTER — HOSPITAL ENCOUNTER (EMERGENCY)
Facility: HOSPITAL | Age: 55
Discharge: HOME | End: 2025-08-06
Attending: EMERGENCY MEDICINE
Payer: MEDICAID

## 2025-08-06 ENCOUNTER — CLINICAL SUPPORT (OUTPATIENT)
Dept: FAMILY MEDICINE | Facility: CLINIC | Age: 55
End: 2025-08-06
Payer: MEDICAID

## 2025-08-06 ENCOUNTER — TELEPHONE (OUTPATIENT)
Dept: FAMILY MEDICINE | Facility: CLINIC | Age: 55
End: 2025-08-06
Payer: MEDICAID

## 2025-08-06 VITALS
DIASTOLIC BLOOD PRESSURE: 114 MMHG | OXYGEN SATURATION: 100 % | WEIGHT: 274 LBS | HEIGHT: 67 IN | HEART RATE: 88 BPM | SYSTOLIC BLOOD PRESSURE: 187 MMHG | BODY MASS INDEX: 43 KG/M2 | RESPIRATION RATE: 16 BRPM | TEMPERATURE: 98 F

## 2025-08-06 DIAGNOSIS — I10 BENIGN ESSENTIAL HTN: ICD-10-CM

## 2025-08-06 DIAGNOSIS — I10 HYPERTENSION, UNSPECIFIED TYPE: ICD-10-CM

## 2025-08-06 DIAGNOSIS — M25.562 LEFT KNEE PAIN: ICD-10-CM

## 2025-08-06 DIAGNOSIS — E11.69 TYPE 2 DIABETES MELLITUS WITH OTHER SPECIFIED COMPLICATION, UNSPECIFIED WHETHER LONG TERM INSULIN USE: ICD-10-CM

## 2025-08-06 DIAGNOSIS — K21.9 GASTROESOPHAGEAL REFLUX DISEASE, UNSPECIFIED WHETHER ESOPHAGITIS PRESENT: ICD-10-CM

## 2025-08-06 DIAGNOSIS — E78.5 HYPERLIPIDEMIA, UNSPECIFIED HYPERLIPIDEMIA TYPE: ICD-10-CM

## 2025-08-06 DIAGNOSIS — M17.12 OSTEOARTHRITIS OF LEFT KNEE, UNSPECIFIED OSTEOARTHRITIS TYPE: Primary | ICD-10-CM

## 2025-08-06 DIAGNOSIS — J30.2 SEASONAL ALLERGIES: ICD-10-CM

## 2025-08-06 DIAGNOSIS — B37.2 YEAST DERMATITIS: ICD-10-CM

## 2025-08-06 LAB
HCV AB SERPL QL IA: NORMAL
HIV 1+2 AB+HIV1 P24 AG SERPL QL IA: NORMAL

## 2025-08-06 PROCEDURE — 86803 HEPATITIS C AB TEST: CPT | Performed by: EMERGENCY MEDICINE

## 2025-08-06 PROCEDURE — 87389 HIV-1 AG W/HIV-1&-2 AB AG IA: CPT | Performed by: EMERGENCY MEDICINE

## 2025-08-06 PROCEDURE — 36415 COLL VENOUS BLD VENIPUNCTURE: CPT | Performed by: EMERGENCY MEDICINE

## 2025-08-06 PROCEDURE — 99284 EMERGENCY DEPT VISIT MOD MDM: CPT | Mod: 25

## 2025-08-06 PROCEDURE — 25000003 PHARM REV CODE 250

## 2025-08-06 RX ORDER — OMEPRAZOLE 40 MG/1
40 CAPSULE, DELAYED RELEASE ORAL DAILY
Qty: 30 CAPSULE | Refills: 2 | Status: CANCELLED | OUTPATIENT
Start: 2025-08-06

## 2025-08-06 RX ORDER — CHLORTHALIDONE 25 MG/1
25 TABLET ORAL ONCE
Status: COMPLETED | OUTPATIENT
Start: 2025-08-06 | End: 2025-08-06

## 2025-08-06 RX ORDER — TRIAMCINOLONE ACETONIDE 1 MG/G
OINTMENT TOPICAL
Qty: 80 G | Refills: 1 | Status: CANCELLED | OUTPATIENT
Start: 2025-08-06

## 2025-08-06 RX ORDER — HYOSCYAMINE SULFATE 0.12 MG/1
0.12 TABLET, ORALLY DISINTEGRATING ORAL EVERY 4 HOURS PRN
Status: CANCELLED | OUTPATIENT
Start: 2025-08-06

## 2025-08-06 RX ORDER — HYDROCHLOROTHIAZIDE 12.5 MG/1
12.5 TABLET ORAL
Status: CANCELLED | OUTPATIENT
Start: 2025-08-06

## 2025-08-06 RX ORDER — ALBUTEROL SULFATE 90 UG/1
2 INHALANT RESPIRATORY (INHALATION) EVERY 6 HOURS PRN
Qty: 18 G | Refills: 2 | Status: CANCELLED | OUTPATIENT
Start: 2025-08-06

## 2025-08-06 RX ORDER — FLUTICASONE PROPIONATE 50 MCG
SPRAY, SUSPENSION (ML) NASAL
Qty: 16 G | Refills: 2 | Status: CANCELLED | OUTPATIENT
Start: 2025-08-06

## 2025-08-06 RX ORDER — COLCHICINE 0.6 MG/1
TABLET ORAL
Qty: 30 TABLET | Refills: 2 | Status: CANCELLED | OUTPATIENT
Start: 2025-08-06

## 2025-08-06 RX ORDER — HYDROCODONE BITARTRATE AND ACETAMINOPHEN 5; 325 MG/1; MG/1
1 TABLET ORAL
Refills: 0 | Status: COMPLETED | OUTPATIENT
Start: 2025-08-06 | End: 2025-08-06

## 2025-08-06 RX ORDER — METHOCARBAMOL 500 MG/1
1000 TABLET, FILM COATED ORAL 3 TIMES DAILY PRN
Qty: 30 TABLET | Refills: 0 | Status: SHIPPED | OUTPATIENT
Start: 2025-08-06

## 2025-08-06 RX ORDER — TAMSULOSIN HYDROCHLORIDE 0.4 MG/1
0.4 CAPSULE ORAL DAILY
Qty: 30 CAPSULE | Refills: 11 | Status: CANCELLED | OUTPATIENT
Start: 2025-08-06

## 2025-08-06 RX ORDER — AZELASTINE 1 MG/ML
1 SPRAY, METERED NASAL 2 TIMES DAILY
Qty: 30 ML | Refills: 2 | Status: CANCELLED | OUTPATIENT
Start: 2025-08-06

## 2025-08-06 RX ORDER — LOSARTAN POTASSIUM 25 MG/1
25 TABLET ORAL ONCE
Status: COMPLETED | OUTPATIENT
Start: 2025-08-06 | End: 2025-08-06

## 2025-08-06 RX ORDER — LOSARTAN POTASSIUM 25 MG/1
25 TABLET ORAL DAILY
Qty: 30 TABLET | Refills: 2 | Status: CANCELLED | OUTPATIENT
Start: 2025-08-06

## 2025-08-06 RX ORDER — LINACLOTIDE 145 UG/1
145 CAPSULE, GELATIN COATED ORAL EVERY MORNING
Qty: 90 CAPSULE | Refills: 1 | Status: CANCELLED | OUTPATIENT
Start: 2025-08-06

## 2025-08-06 RX ORDER — CETIRIZINE HYDROCHLORIDE 10 MG/1
10 TABLET ORAL DAILY
Qty: 30 TABLET | Refills: 2 | Status: CANCELLED | OUTPATIENT
Start: 2025-08-06

## 2025-08-06 RX ORDER — AMLODIPINE BESYLATE 5 MG/1
5 TABLET ORAL DAILY
Qty: 30 TABLET | Refills: 2 | Status: CANCELLED | OUTPATIENT
Start: 2025-08-06 | End: 2026-08-06

## 2025-08-06 RX ORDER — AMLODIPINE BESYLATE 5 MG/1
5 TABLET ORAL
Status: COMPLETED | OUTPATIENT
Start: 2025-08-06 | End: 2025-08-06

## 2025-08-06 RX ORDER — NAPROXEN 500 MG/1
500 TABLET ORAL 2 TIMES DAILY PRN
Qty: 30 TABLET | Refills: 0 | Status: SHIPPED | OUTPATIENT
Start: 2025-08-06

## 2025-08-06 RX ORDER — ATORVASTATIN CALCIUM 10 MG/1
10 TABLET, FILM COATED ORAL DAILY
Qty: 30 TABLET | Refills: 2 | Status: CANCELLED | OUTPATIENT
Start: 2025-08-06 | End: 2026-08-06

## 2025-08-06 RX ORDER — ALLOPURINOL 300 MG/1
300 TABLET ORAL DAILY
Qty: 90 TABLET | Refills: 1 | Status: CANCELLED | OUTPATIENT
Start: 2025-08-06

## 2025-08-06 RX ADMIN — LOSARTAN POTASSIUM 25 MG: 25 TABLET, FILM COATED ORAL at 02:08

## 2025-08-06 RX ADMIN — HYDROCODONE BITARTRATE AND ACETAMINOPHEN 1 TABLET: 5; 325 TABLET ORAL at 02:08

## 2025-08-06 RX ADMIN — CHLORTHALIDONE 25 MG: 25 TABLET ORAL at 02:08

## 2025-08-06 RX ADMIN — AMLODIPINE BESYLATE 5 MG: 5 TABLET ORAL at 02:08

## 2025-08-06 NOTE — Clinical Note
"Pierre Escobedoe" Guerrero was seen and treated in our emergency department on 8/6/2025.  He may return to work on 08/11/2025.       If you have any questions or concerns, please don't hesitate to call.      Holdsworth, Alayna, PA-C"

## 2025-08-06 NOTE — ED NOTES
54 y.o. male to ED with c.o. left knee pain x 2 weeks. Patient states he is a  and jumped off the truck  bed onto the ground and it has been painful ever since. Patient denies swelling, denies pain at rest, states it hurts to bear weight. Patient ambulatory with steady gait but reports pain with walking. Patient denies all other medical complaints at this time. Patient awake, alert, and oriented x 4. No apparent distress noted. VS currently stable. Patient assisted onto stretcher and changed into a gown. Patient placed on cardiac monitor, continuous pulse oximetry and automatic blood pressure cuff. Bed placed in low locked position, side rails up x 2, call light is within reach of patient orientation to room and explanation of wait provided to patient, alarms set and turned on for monitor and pulse ox, awaiting provider evaluation and orders, plan of care in progress.

## 2025-08-06 NOTE — PROGRESS NOTES
Pt came in to go over meds. Pt is in need of refills. Pending all meds patient states he needs refills of

## 2025-08-06 NOTE — TELEPHONE ENCOUNTER
----- Message from Sunny Katz sent at 8/6/2025 10:54 AM CDT -----  Pt requesting refills on all medications please send to [facility] [address]     Pt # [phone]

## 2025-08-06 NOTE — DISCHARGE INSTRUCTIONS

## 2025-08-07 ENCOUNTER — TELEPHONE (OUTPATIENT)
Dept: FAMILY MEDICINE | Facility: CLINIC | Age: 55
End: 2025-08-07
Payer: MEDICAID

## 2025-08-07 NOTE — TELEPHONE ENCOUNTER
----- Message from Tiffanie sent at 8/7/2025 10:14 AM CDT -----  Pt called to state that he has not gotten his refills sent to the pharmacy yet. He was seen yesterday.    [phone]

## 2025-08-07 NOTE — TELEPHONE ENCOUNTER
Not possible. Has not been seen since 4/24 and only given 90 day supply  The last several times seen in ed bp extremely high. Please clarify

## 2025-08-07 NOTE — TELEPHONE ENCOUNTER
----- Message from Idalia Hercules NP sent at 8/6/2025  3:25 PM CDT -----  Has not been seen in >1 year. Needs visit. Next Friday?

## 2025-08-07 NOTE — TELEPHONE ENCOUNTER
Spoke to pt and he has been taking his meds. Can not come in on 15 will be out of town    pulse oximetry

## 2025-08-09 LAB
HOLD SPECIMEN: NORMAL
HOLD SPECIMEN: NORMAL

## 2025-08-19 ENCOUNTER — TELEPHONE (OUTPATIENT)
Dept: FAMILY MEDICINE | Facility: CLINIC | Age: 55
End: 2025-08-19
Payer: MEDICAID

## 2025-08-19 DIAGNOSIS — E11.69 TYPE 2 DIABETES MELLITUS WITH OTHER SPECIFIED COMPLICATION, UNSPECIFIED WHETHER LONG TERM INSULIN USE: ICD-10-CM

## 2025-08-19 DIAGNOSIS — Z51.81 ENCOUNTER FOR THERAPEUTIC DRUG MONITORING: ICD-10-CM

## 2025-08-19 DIAGNOSIS — I10 BENIGN ESSENTIAL HTN: ICD-10-CM

## 2025-08-19 DIAGNOSIS — Z12.5 SCREENING FOR PROSTATE CANCER: ICD-10-CM

## 2025-08-19 DIAGNOSIS — Z79.899 ENCOUNTER FOR LONG-TERM (CURRENT) USE OF HIGH-RISK MEDICATION: ICD-10-CM

## 2025-08-19 DIAGNOSIS — Z00.01 ENCOUNTER FOR GENERAL ADULT MEDICAL EXAMINATION WITH ABNORMAL FINDINGS: ICD-10-CM

## 2025-08-19 DIAGNOSIS — E78.5 HYPERLIPIDEMIA, UNSPECIFIED HYPERLIPIDEMIA TYPE: Primary | ICD-10-CM

## (undated) DEVICE — CONTAINER SPECIMEN OR STER 4OZ

## (undated) DEVICE — FIBER LASER HOLMIUM 273 MICRON

## (undated) DEVICE — SYR ONLY LUER LOCK 20CC

## (undated) DEVICE — GLOVE SENSICARE PI ALOE 6.5

## (undated) DEVICE — SET IRR URLGY 2LINE UNIV SPIKE

## (undated) DEVICE — SPONGE BULKEE II ABSRB 6X6.75

## (undated) DEVICE — EXTRACTOR STONE 4WR NIT 2.2F 1

## (undated) DEVICE — JELLY SURGILUBE LUBE TUBE 2OZ

## (undated) DEVICE — BAG LINGEMAN DRAIN UROLOGY

## (undated) DEVICE — BOWL STERILE LARGE 32OZ

## (undated) DEVICE — SCRUB DYNA-HEX LIQ 4% CHG 4OZ

## (undated) DEVICE — SOL NACL IRR 1000ML BTL

## (undated) DEVICE — SLEEVE SCD EXPRESS KNEE MEDIUM

## (undated) DEVICE — LEGGING CLEAR POLY 2/PACK

## (undated) DEVICE — SOL NACL IRR 3000ML

## (undated) DEVICE — GOWN POLY REINF BRTH SLV LG

## (undated) DEVICE — WIRE GD LUB STD 3CM .038 150CM

## (undated) DEVICE — COVER TABLE 44X90 STERILE

## (undated) DEVICE — DRAPE CYSTOSCOPY LARGE

## (undated) DEVICE — GUIDE WIRE MOTION .035 X 150CM